# Patient Record
Sex: MALE | Race: WHITE | Employment: OTHER | URBAN - METROPOLITAN AREA
[De-identification: names, ages, dates, MRNs, and addresses within clinical notes are randomized per-mention and may not be internally consistent; named-entity substitution may affect disease eponyms.]

---

## 2017-02-01 ENCOUNTER — OFFICE VISIT (OUTPATIENT)
Dept: FAMILY MEDICINE CLINIC | Age: 82
End: 2017-02-01

## 2017-02-01 VITALS
RESPIRATION RATE: 20 BRPM | TEMPERATURE: 97.4 F | WEIGHT: 160 LBS | DIASTOLIC BLOOD PRESSURE: 72 MMHG | HEIGHT: 71 IN | HEART RATE: 76 BPM | SYSTOLIC BLOOD PRESSURE: 118 MMHG | BODY MASS INDEX: 22.4 KG/M2

## 2017-02-01 DIAGNOSIS — R11.0 NAUSEA: ICD-10-CM

## 2017-02-01 DIAGNOSIS — A08.4 VIRAL GASTROENTERITIS: Primary | ICD-10-CM

## 2017-02-01 DIAGNOSIS — E11.9 TYPE 2 DIABETES MELLITUS WITHOUT COMPLICATION, WITHOUT LONG-TERM CURRENT USE OF INSULIN (HCC): ICD-10-CM

## 2017-02-01 DIAGNOSIS — J34.89 SINUS PRESSURE: ICD-10-CM

## 2017-02-01 LAB
CREATININE URINE: 24.4 MG/DL
HBA1C MFR BLD: 5.7 % (ref 4.8–5.9)
MICROALBUMIN UR-MCNC: <1.2 MG/DL
MICROALBUMIN/CREAT UR-RTO: NORMAL MG/G (ref 0–30)

## 2017-02-01 PROCEDURE — 99213 OFFICE O/P EST LOW 20 MIN: CPT | Performed by: FAMILY MEDICINE

## 2017-02-01 ASSESSMENT — ENCOUNTER SYMPTOMS
ABDOMINAL PAIN: 0
COUGH: 1
CONSTIPATION: 1
SORE THROAT: 0
SINUS PRESSURE: 1
EYE PAIN: 1

## 2017-02-27 ENCOUNTER — CARE COORDINATION (OUTPATIENT)
Dept: FAMILY MEDICINE CLINIC | Age: 82
End: 2017-02-27

## 2017-02-27 RX ORDER — COLCHICINE 0.6 MG/1
0.6 TABLET ORAL DAILY
COMMUNITY
End: 2017-02-28 | Stop reason: CLARIF

## 2017-02-27 RX ORDER — DICYCLOMINE HCL 20 MG
20 TABLET ORAL 4 TIMES DAILY
COMMUNITY
Start: 2017-02-27 | End: 2017-02-28 | Stop reason: CLARIF

## 2017-02-28 ENCOUNTER — CARE COORDINATOR VISIT (OUTPATIENT)
Dept: FAMILY MEDICINE CLINIC | Age: 82
End: 2017-02-28

## 2017-02-28 RX ORDER — ASPIRIN 81 MG/1
81 TABLET ORAL DAILY
Status: ON HOLD | COMMUNITY
Start: 2017-02-22 | End: 2018-10-31 | Stop reason: HOSPADM

## 2017-02-28 RX ORDER — METOPROLOL SUCCINATE 25 MG/1
25 TABLET, EXTENDED RELEASE ORAL DAILY
COMMUNITY
End: 2017-03-01 | Stop reason: SDUPTHER

## 2017-02-28 RX ORDER — LISINOPRIL 2.5 MG/1
2.5 TABLET ORAL DAILY
COMMUNITY
Start: 2017-02-21 | End: 2017-03-01 | Stop reason: SDUPTHER

## 2017-03-01 ENCOUNTER — OFFICE VISIT (OUTPATIENT)
Dept: FAMILY MEDICINE CLINIC | Age: 82
End: 2017-03-01

## 2017-03-01 VITALS
SYSTOLIC BLOOD PRESSURE: 108 MMHG | HEART RATE: 68 BPM | HEIGHT: 71 IN | RESPIRATION RATE: 20 BRPM | DIASTOLIC BLOOD PRESSURE: 68 MMHG | WEIGHT: 164 LBS | TEMPERATURE: 97.7 F | BODY MASS INDEX: 22.96 KG/M2

## 2017-03-01 DIAGNOSIS — R10.84 GENERALIZED ABDOMINAL PAIN: Primary | ICD-10-CM

## 2017-03-01 DIAGNOSIS — Z09 HOSPITAL DISCHARGE FOLLOW-UP: ICD-10-CM

## 2017-03-01 DIAGNOSIS — R11.0 NAUSEA: ICD-10-CM

## 2017-03-01 DIAGNOSIS — F43.21 GRIEF REACTION: ICD-10-CM

## 2017-03-01 DIAGNOSIS — R42 LIGHTHEADED: ICD-10-CM

## 2017-03-01 PROCEDURE — 99214 OFFICE O/P EST MOD 30 MIN: CPT | Performed by: FAMILY MEDICINE

## 2017-03-01 RX ORDER — METOPROLOL SUCCINATE 25 MG/1
25 TABLET, EXTENDED RELEASE ORAL DAILY
Qty: 30 TABLET | Refills: 5 | Status: SHIPPED | OUTPATIENT
Start: 2017-03-01 | End: 2017-04-27

## 2017-03-01 RX ORDER — LISINOPRIL 2.5 MG/1
2.5 TABLET ORAL DAILY
Qty: 30 TABLET | Refills: 5 | Status: SHIPPED | OUTPATIENT
Start: 2017-03-01 | End: 2017-04-24 | Stop reason: SDUPTHER

## 2017-03-01 ASSESSMENT — ENCOUNTER SYMPTOMS
SHORTNESS OF BREATH: 0
COUGH: 0
DIARRHEA: 0
NAUSEA: 1
EYE ITCHING: 0
CONSTIPATION: 0
EYE DISCHARGE: 0
SINUS PRESSURE: 0
SORE THROAT: 0

## 2017-04-06 ENCOUNTER — OFFICE VISIT (OUTPATIENT)
Dept: FAMILY MEDICINE CLINIC | Age: 82
End: 2017-04-06

## 2017-04-06 VITALS
BODY MASS INDEX: 22.54 KG/M2 | HEART RATE: 64 BPM | DIASTOLIC BLOOD PRESSURE: 62 MMHG | RESPIRATION RATE: 20 BRPM | TEMPERATURE: 97.3 F | SYSTOLIC BLOOD PRESSURE: 108 MMHG | WEIGHT: 161 LBS | HEIGHT: 71 IN

## 2017-04-06 DIAGNOSIS — I95.9 HYPOTENSION, UNSPECIFIED HYPOTENSION TYPE: Primary | ICD-10-CM

## 2017-04-06 DIAGNOSIS — F43.21 GRIEF REACTION: ICD-10-CM

## 2017-04-06 DIAGNOSIS — G47.00 INSOMNIA, UNSPECIFIED TYPE: ICD-10-CM

## 2017-04-06 PROCEDURE — 99213 OFFICE O/P EST LOW 20 MIN: CPT | Performed by: FAMILY MEDICINE

## 2017-04-06 RX ORDER — ONDANSETRON 4 MG/1
TABLET, FILM COATED ORAL
Qty: 30 TABLET | Refills: 1 | Status: SHIPPED | OUTPATIENT
Start: 2017-04-06 | End: 2017-06-06 | Stop reason: SDUPTHER

## 2017-04-06 RX ORDER — TAMSULOSIN HYDROCHLORIDE 0.4 MG/1
CAPSULE ORAL
Qty: 90 CAPSULE | Refills: 1 | Status: SHIPPED | OUTPATIENT
Start: 2017-04-06 | End: 2017-06-01 | Stop reason: SDUPTHER

## 2017-04-06 ASSESSMENT — ENCOUNTER SYMPTOMS
SORE THROAT: 0
ABDOMINAL PAIN: 0
EYE ITCHING: 0
EYE DISCHARGE: 0
CONSTIPATION: 0
DIARRHEA: 0
COUGH: 0
SINUS PRESSURE: 0

## 2017-04-21 RX ORDER — OMEPRAZOLE 40 MG/1
CAPSULE, DELAYED RELEASE ORAL
Qty: 90 CAPSULE | Refills: 3 | Status: SHIPPED | OUTPATIENT
Start: 2017-04-21 | End: 2017-04-24 | Stop reason: SDUPTHER

## 2017-04-24 RX ORDER — OMEPRAZOLE 40 MG/1
CAPSULE, DELAYED RELEASE ORAL
Qty: 90 CAPSULE | Refills: 3 | Status: SHIPPED | OUTPATIENT
Start: 2017-04-24 | End: 2017-06-01 | Stop reason: SDUPTHER

## 2017-04-24 RX ORDER — LISINOPRIL 2.5 MG/1
2.5 TABLET ORAL DAILY
Qty: 30 TABLET | Refills: 0 | Status: SHIPPED | OUTPATIENT
Start: 2017-04-24 | End: 2017-04-27 | Stop reason: SDUPTHER

## 2017-04-24 RX ORDER — LISINOPRIL 2.5 MG/1
2.5 TABLET ORAL DAILY
Qty: 90 TABLET | Refills: 3 | Status: SHIPPED | OUTPATIENT
Start: 2017-04-24 | End: 2017-04-27

## 2017-04-27 ENCOUNTER — OFFICE VISIT (OUTPATIENT)
Dept: FAMILY MEDICINE CLINIC | Age: 82
End: 2017-04-27

## 2017-04-27 VITALS
RESPIRATION RATE: 20 BRPM | HEART RATE: 80 BPM | BODY MASS INDEX: 22.4 KG/M2 | SYSTOLIC BLOOD PRESSURE: 102 MMHG | DIASTOLIC BLOOD PRESSURE: 62 MMHG | WEIGHT: 160 LBS | HEIGHT: 71 IN | TEMPERATURE: 97.9 F

## 2017-04-27 DIAGNOSIS — F43.21 GRIEF REACTION: ICD-10-CM

## 2017-04-27 DIAGNOSIS — I95.9 HYPOTENSION, UNSPECIFIED HYPOTENSION TYPE: Primary | ICD-10-CM

## 2017-04-27 PROCEDURE — 99213 OFFICE O/P EST LOW 20 MIN: CPT | Performed by: FAMILY MEDICINE

## 2017-04-27 ASSESSMENT — ENCOUNTER SYMPTOMS
EYE DISCHARGE: 0
SINUS PRESSURE: 0
EYE ITCHING: 0
CONSTIPATION: 0
SORE THROAT: 0
ABDOMINAL PAIN: 0
COUGH: 0
SHORTNESS OF BREATH: 0
DIARRHEA: 0

## 2017-05-08 RX ORDER — SIMVASTATIN 40 MG
TABLET ORAL
Qty: 30 TABLET | Refills: 4 | Status: SHIPPED | OUTPATIENT
Start: 2017-05-08 | End: 2017-06-01 | Stop reason: SDUPTHER

## 2017-06-01 ENCOUNTER — OFFICE VISIT (OUTPATIENT)
Dept: FAMILY MEDICINE CLINIC | Age: 82
End: 2017-06-01

## 2017-06-01 VITALS
RESPIRATION RATE: 16 BRPM | WEIGHT: 164 LBS | DIASTOLIC BLOOD PRESSURE: 72 MMHG | TEMPERATURE: 97.3 F | BODY MASS INDEX: 22.96 KG/M2 | HEIGHT: 71 IN | SYSTOLIC BLOOD PRESSURE: 108 MMHG | HEART RATE: 84 BPM

## 2017-06-01 DIAGNOSIS — R53.81 MALAISE AND FATIGUE: ICD-10-CM

## 2017-06-01 DIAGNOSIS — I10 ESSENTIAL HYPERTENSION: ICD-10-CM

## 2017-06-01 DIAGNOSIS — E78.5 HYPERLIPIDEMIA, UNSPECIFIED HYPERLIPIDEMIA TYPE: ICD-10-CM

## 2017-06-01 DIAGNOSIS — R53.83 MALAISE AND FATIGUE: ICD-10-CM

## 2017-06-01 DIAGNOSIS — R05.3 CHRONIC COUGH: ICD-10-CM

## 2017-06-01 DIAGNOSIS — I10 ESSENTIAL HYPERTENSION: Primary | ICD-10-CM

## 2017-06-01 DIAGNOSIS — E11.9 TYPE 2 DIABETES MELLITUS WITHOUT COMPLICATION, WITHOUT LONG-TERM CURRENT USE OF INSULIN (HCC): ICD-10-CM

## 2017-06-01 DIAGNOSIS — R13.10 DYSPHAGIA, UNSPECIFIED TYPE: ICD-10-CM

## 2017-06-01 DIAGNOSIS — R05.8 NON-PRODUCTIVE COUGH: ICD-10-CM

## 2017-06-01 LAB
ALBUMIN SERPL-MCNC: 4.3 G/DL (ref 3.9–4.9)
ALP BLD-CCNC: 70 U/L (ref 35–104)
ALT SERPL-CCNC: 14 U/L (ref 0–41)
ANION GAP SERPL CALCULATED.3IONS-SCNC: 13 MEQ/L (ref 7–13)
AST SERPL-CCNC: 16 U/L (ref 0–40)
BASOPHILS ABSOLUTE: 0 K/UL (ref 0–0.2)
BASOPHILS RELATIVE PERCENT: 0.7 %
BILIRUB SERPL-MCNC: 0.3 MG/DL (ref 0–1.2)
BUN BLDV-MCNC: 27 MG/DL (ref 8–23)
CALCIUM SERPL-MCNC: 8.8 MG/DL (ref 8.6–10.2)
CHLORIDE BLD-SCNC: 104 MEQ/L (ref 98–107)
CHOLESTEROL, TOTAL: 143 MG/DL (ref 0–199)
CO2: 23 MEQ/L (ref 22–29)
CREAT SERPL-MCNC: 1.13 MG/DL (ref 0.7–1.2)
EOSINOPHILS ABSOLUTE: 0.1 K/UL (ref 0–0.7)
EOSINOPHILS RELATIVE PERCENT: 1.6 %
GFR AFRICAN AMERICAN: >60
GFR NON-AFRICAN AMERICAN: >60
GLOBULIN: 2.6 G/DL (ref 2.3–3.5)
GLUCOSE BLD-MCNC: 108 MG/DL (ref 74–109)
HCT VFR BLD CALC: 36.7 % (ref 42–52)
HDLC SERPL-MCNC: 31 MG/DL (ref 40–59)
HEMOGLOBIN: 12.1 G/DL (ref 14–18)
LDL CHOLESTEROL CALCULATED: 41 MG/DL (ref 0–129)
LYMPHOCYTES ABSOLUTE: 1.2 K/UL (ref 1–4.8)
LYMPHOCYTES RELATIVE PERCENT: 21.8 %
MCH RBC QN AUTO: 31.5 PG (ref 27–31.3)
MCHC RBC AUTO-ENTMCNC: 33 % (ref 33–37)
MCV RBC AUTO: 95.7 FL (ref 80–100)
MONOCYTES ABSOLUTE: 0.5 K/UL (ref 0.2–0.8)
MONOCYTES RELATIVE PERCENT: 9.8 %
NEUTROPHILS ABSOLUTE: 3.6 K/UL (ref 1.4–6.5)
NEUTROPHILS RELATIVE PERCENT: 66.1 %
PDW BLD-RTO: 13.4 % (ref 11.5–14.5)
PLATELET # BLD: 188 K/UL (ref 130–400)
POTASSIUM SERPL-SCNC: 4.6 MEQ/L (ref 3.5–5.1)
RBC # BLD: 3.84 M/UL (ref 4.7–6.1)
SODIUM BLD-SCNC: 140 MEQ/L (ref 132–144)
T4 FREE: 1 NG/DL (ref 0.93–1.7)
TOTAL PROTEIN: 6.9 G/DL (ref 6.4–8.1)
TRIGL SERPL-MCNC: 355 MG/DL (ref 0–200)
WBC # BLD: 5.5 K/UL (ref 4.8–10.8)

## 2017-06-01 PROCEDURE — 99214 OFFICE O/P EST MOD 30 MIN: CPT | Performed by: FAMILY MEDICINE

## 2017-06-01 RX ORDER — OMEPRAZOLE 40 MG/1
CAPSULE, DELAYED RELEASE ORAL
Qty: 90 CAPSULE | Refills: 1 | Status: SHIPPED | OUTPATIENT
Start: 2017-06-01 | End: 2018-01-19 | Stop reason: SDUPTHER

## 2017-06-01 RX ORDER — SIMVASTATIN 40 MG
TABLET ORAL
Qty: 90 TABLET | Refills: 1 | Status: SHIPPED | OUTPATIENT
Start: 2017-06-01 | End: 2017-09-28 | Stop reason: ALTCHOICE

## 2017-06-01 RX ORDER — TAMSULOSIN HYDROCHLORIDE 0.4 MG/1
CAPSULE ORAL
Qty: 90 CAPSULE | Refills: 1 | Status: SHIPPED | OUTPATIENT
Start: 2017-06-01 | End: 2018-01-14 | Stop reason: SDUPTHER

## 2017-06-01 ASSESSMENT — ENCOUNTER SYMPTOMS
EYE DISCHARGE: 0
SINUS PRESSURE: 1
SHORTNESS OF BREATH: 0
SORE THROAT: 0
DIARRHEA: 0
ABDOMINAL PAIN: 0
COUGH: 1
CONSTIPATION: 0
EYE ITCHING: 0

## 2017-06-03 LAB — TESTOSTERONE TOTAL-MALE: 225 NG/DL (ref 300–720)

## 2017-06-06 ENCOUNTER — NURSE ONLY (OUTPATIENT)
Dept: FAMILY MEDICINE CLINIC | Age: 82
End: 2017-06-06

## 2017-06-06 ENCOUNTER — OFFICE VISIT (OUTPATIENT)
Dept: FAMILY MEDICINE CLINIC | Age: 82
End: 2017-06-06

## 2017-06-06 VITALS
SYSTOLIC BLOOD PRESSURE: 130 MMHG | RESPIRATION RATE: 10 BRPM | BODY MASS INDEX: 22.96 KG/M2 | HEIGHT: 71 IN | DIASTOLIC BLOOD PRESSURE: 80 MMHG | HEART RATE: 70 BPM | TEMPERATURE: 96.9 F | WEIGHT: 164 LBS

## 2017-06-06 DIAGNOSIS — D64.9 ANEMIA, UNSPECIFIED TYPE: ICD-10-CM

## 2017-06-06 DIAGNOSIS — E29.1 HYPOGONADISM IN MALE: Primary | ICD-10-CM

## 2017-06-06 LAB
IRON SATURATION: 28 % (ref 11–46)
IRON: 88 UG/DL (ref 59–158)
TOTAL IRON BINDING CAPACITY: 317 UG/DL (ref 178–450)

## 2017-06-06 PROCEDURE — 99213 OFFICE O/P EST LOW 20 MIN: CPT | Performed by: FAMILY MEDICINE

## 2017-06-06 PROCEDURE — 96372 THER/PROPH/DIAG INJ SC/IM: CPT | Performed by: FAMILY MEDICINE

## 2017-06-06 RX ORDER — TESTOSTERONE CYPIONATE 200 MG/ML
200 INJECTION INTRAMUSCULAR
COMMUNITY
End: 2018-01-26

## 2017-06-06 RX ORDER — ONDANSETRON 4 MG/1
TABLET, FILM COATED ORAL
Qty: 30 TABLET | Refills: 1 | Status: SHIPPED | OUTPATIENT
Start: 2017-06-06 | End: 2017-11-17 | Stop reason: SDUPTHER

## 2017-06-06 RX ORDER — TESTOSTERONE CYPIONATE 200 MG/ML
200 INJECTION INTRAMUSCULAR ONCE
Status: COMPLETED | OUTPATIENT
Start: 2017-06-06 | End: 2017-06-06

## 2017-06-06 RX ADMIN — TESTOSTERONE CYPIONATE 200 MG: 200 INJECTION INTRAMUSCULAR at 10:18

## 2017-06-06 ASSESSMENT — ENCOUNTER SYMPTOMS
SHORTNESS OF BREATH: 0
EYES NEGATIVE: 1
COUGH: 0
DIARRHEA: 0
NAUSEA: 0
CONSTIPATION: 0
ABDOMINAL PAIN: 0

## 2017-06-07 LAB — VITAMIN B-12: 389 PG/ML (ref 211–946)

## 2017-08-01 ENCOUNTER — NURSE ONLY (OUTPATIENT)
Dept: FAMILY MEDICINE CLINIC | Age: 82
End: 2017-08-01

## 2017-08-01 DIAGNOSIS — E29.1 HYPOGONADISM IN MALE: Primary | ICD-10-CM

## 2017-08-01 PROCEDURE — 96372 THER/PROPH/DIAG INJ SC/IM: CPT | Performed by: FAMILY MEDICINE

## 2017-08-01 RX ORDER — TESTOSTERONE CYPIONATE 200 MG/ML
200 INJECTION INTRAMUSCULAR ONCE
Status: COMPLETED | OUTPATIENT
Start: 2017-08-01 | End: 2017-08-01

## 2017-08-01 RX ADMIN — TESTOSTERONE CYPIONATE 200 MG: 200 INJECTION INTRAMUSCULAR at 10:18

## 2017-09-05 ENCOUNTER — NURSE ONLY (OUTPATIENT)
Dept: FAMILY MEDICINE CLINIC | Age: 82
End: 2017-09-05

## 2017-09-05 ENCOUNTER — TELEPHONE (OUTPATIENT)
Dept: FAMILY MEDICINE CLINIC | Age: 82
End: 2017-09-05

## 2017-09-05 DIAGNOSIS — E29.1 HYPOGONADISM IN MALE: Primary | ICD-10-CM

## 2017-09-05 PROCEDURE — 96372 THER/PROPH/DIAG INJ SC/IM: CPT | Performed by: FAMILY MEDICINE

## 2017-09-05 RX ORDER — TESTOSTERONE CYPIONATE 200 MG/ML
200 INJECTION INTRAMUSCULAR ONCE
Status: COMPLETED | OUTPATIENT
Start: 2017-09-05 | End: 2017-09-05

## 2017-09-05 RX ADMIN — TESTOSTERONE CYPIONATE 200 MG: 200 INJECTION INTRAMUSCULAR at 10:12

## 2017-09-19 DIAGNOSIS — K27.4 GASTROINTESTINAL HEMORRHAGE ASSOCIATED WITH PEPTIC ULCER: Primary | ICD-10-CM

## 2017-09-19 LAB
BUN BLDV-MCNC: 9 MG/DL
CALCIUM SERPL-MCNC: 8.1 MG/DL
CHLORIDE BLD-SCNC: 107 MMOL/L
CHOLESTEROL, TOTAL: 5 MG/DL
CHOLESTEROL/HDL RATIO: 2.9
CO2: NORMAL MMOL/L
CREAT SERPL-MCNC: 1.36 MG/DL
GFR CALCULATED: NORMAL
GLUCOSE BLD-MCNC: 101 MG/DL
HDLC SERPL-MCNC: 19 MG/DL (ref 35–70)
LDL CHOLESTEROL CALCULATED: 21 MG/DL (ref 0–160)
POTASSIUM SERPL-SCNC: 3.5 MMOL/L
SODIUM BLD-SCNC: 141 MMOL/L
TRIGL SERPL-MCNC: 82 MG/DL
VLDLC SERPL CALC-MCNC: 16 MG/DL

## 2017-09-26 ENCOUNTER — CARE COORDINATION (OUTPATIENT)
Dept: PRIMARY CARE CLINIC | Age: 82
End: 2017-09-26

## 2017-09-28 ENCOUNTER — OFFICE VISIT (OUTPATIENT)
Dept: FAMILY MEDICINE CLINIC | Age: 82
End: 2017-09-28

## 2017-09-28 VITALS
DIASTOLIC BLOOD PRESSURE: 82 MMHG | TEMPERATURE: 97.9 F | HEIGHT: 71 IN | WEIGHT: 162 LBS | HEART RATE: 67 BPM | BODY MASS INDEX: 22.68 KG/M2 | SYSTOLIC BLOOD PRESSURE: 118 MMHG

## 2017-09-28 DIAGNOSIS — I42.9 CARDIOMYOPATHY, UNSPECIFIED TYPE (HCC): ICD-10-CM

## 2017-09-28 DIAGNOSIS — C67.9 MALIGNANT NEOPLASM OF URINARY BLADDER, UNSPECIFIED SITE (HCC): ICD-10-CM

## 2017-09-28 DIAGNOSIS — E11.9 TYPE 2 DIABETES MELLITUS WITHOUT COMPLICATION, WITHOUT LONG-TERM CURRENT USE OF INSULIN (HCC): ICD-10-CM

## 2017-09-28 DIAGNOSIS — I10 ESSENTIAL HYPERTENSION: ICD-10-CM

## 2017-09-28 DIAGNOSIS — K52.9 COLITIS: Primary | ICD-10-CM

## 2017-09-28 DIAGNOSIS — C67.9 PRIMARY BLADDER MALIGNANT NEOPLASM (HCC): ICD-10-CM

## 2017-09-28 LAB
ANION GAP SERPL CALCULATED.3IONS-SCNC: 14 MEQ/L (ref 7–13)
BUN BLDV-MCNC: 27 MG/DL (ref 8–23)
CALCIUM SERPL-MCNC: 8.8 MG/DL (ref 8.6–10.2)
CHLORIDE BLD-SCNC: 100 MEQ/L (ref 98–107)
CO2: 25 MEQ/L (ref 22–29)
CREAT SERPL-MCNC: 0.95 MG/DL (ref 0.7–1.2)
CREATININE URINE: 59.9 MG/DL
GFR AFRICAN AMERICAN: >60
GFR NON-AFRICAN AMERICAN: >60
GLUCOSE BLD-MCNC: 105 MG/DL (ref 74–109)
HBA1C MFR BLD: 5.9 % (ref 4.8–5.9)
MICROALBUMIN UR-MCNC: <1.2 MG/DL
MICROALBUMIN/CREAT UR-RTO: NORMAL MG/G (ref 0–30)
POTASSIUM SERPL-SCNC: 5 MEQ/L (ref 3.5–5.1)
SODIUM BLD-SCNC: 139 MEQ/L (ref 132–144)

## 2017-09-28 PROCEDURE — 99214 OFFICE O/P EST MOD 30 MIN: CPT | Performed by: FAMILY MEDICINE

## 2017-09-28 RX ORDER — LOSARTAN POTASSIUM 25 MG/1
25 TABLET ORAL DAILY
Refills: 6 | COMMUNITY
Start: 2017-09-24 | End: 2017-11-21 | Stop reason: ALTCHOICE

## 2017-09-28 RX ORDER — DOCUSATE SODIUM 100 MG/1
100 CAPSULE, LIQUID FILLED ORAL 2 TIMES DAILY
Status: ON HOLD | COMMUNITY
Start: 2012-04-26 | End: 2017-12-08

## 2017-09-28 RX ORDER — SIMVASTATIN 20 MG
20 TABLET ORAL NIGHTLY
COMMUNITY
End: 2018-01-22 | Stop reason: SDUPTHER

## 2017-09-28 RX ORDER — AMLODIPINE BESYLATE 5 MG/1
5 TABLET ORAL DAILY
Refills: 0 | COMMUNITY
Start: 2017-09-21 | End: 2017-10-31 | Stop reason: SDUPTHER

## 2017-09-28 ASSESSMENT — ENCOUNTER SYMPTOMS
SHORTNESS OF BREATH: 0
SORE THROAT: 0
SINUS PRESSURE: 0
ABDOMINAL PAIN: 0
EYE ITCHING: 0
COUGH: 1
CONSTIPATION: 0
DIARRHEA: 0
EYE DISCHARGE: 0

## 2017-10-02 ENCOUNTER — CARE COORDINATION (OUTPATIENT)
Dept: FAMILY MEDICINE CLINIC | Age: 82
End: 2017-10-02

## 2017-10-09 ENCOUNTER — CARE COORDINATION (OUTPATIENT)
Dept: FAMILY MEDICINE CLINIC | Age: 82
End: 2017-10-09

## 2017-10-09 NOTE — CARE COORDINATION
Planning:  Avoidance of concentrated sweets   How often do you test your blood sugar?:  No Testing   Do you have barriers with adherence to non-pharmacologic self-management interventions?  (Nutrition/Exercise/Self-Monitoring):  No   Have you ever had to go to the ED for symptoms of low blood sugar?:  No       No patient-reported symptoms      ,

## 2017-10-09 NOTE — CARE COORDINATION
Ambulatory Care Coordination Note  10/9/2017  CM Risk Score: 2  Adriane Mortality Risk Score: 11.68    ACC: Jens Banuelos RN    Summary Note: F/U call with patient. Denies issues or concerns. Denies chest pressure, fluttering heart. States has been taking medications , ACC noted when questioned in detail re: medications patient had difficulty  naming some medications. And explaining dose/when to take. Why taking. Instructed in all medications from inpatient list and instructed to bring all medications to next weeks appt for review with ACC. Patient denies abd. Pain, black stools, states eating ok, drinking fluids. Patient lives alone and has little support other than nieces/nephews who stop by on occasion. States takes care of himself, fixes own meals or goes out. Patient takes care of home. Patient verbalizes good understanding of having had colonscopy and polyp removed. Instructed in sx of bleeding to report. Care Coordination Interventions    Program Enrollment:  Rising Risk  Referral from Primary Care Provider:  No  Suggested Interventions and Community Resources  Medi Set or Pill Pack: In Process         Goals Addressed     None          Prior to Admission medications    Medication Sig Start Date End Date Taking?  Authorizing Provider   amLODIPine (NORVASC) 5 MG tablet Take 5 mg by mouth daily 9/21/17  Yes Historical Provider, MD   metoprolol tartrate (LOPRESSOR) 25 MG tablet TAKE 1/2 (ONE-HALF) OF A TABLET TWICE DAILY (hold for sbp<100 or hr <60) 9/21/17  Yes Historical Provider, MD   losartan (COZAAR) 25 MG tablet Take 25 mg by mouth daily 9/24/17   Historical Provider, MD   docusate sodium (COLACE) 100 MG capsule Take 100 mg by mouth 2 times daily 4/26/12   Historical Provider, MD   simvastatin (ZOCOR) 20 MG tablet Take 20 mg by mouth nightly    Historical Provider, MD   ondansetron (ZOFRAN) 4 MG tablet TAKE 1 TO 2 TABLETS EVERY 4 TO 6 HOURS AS NEEDED FOR NAUSEA AND VOMITING 6/6/17   Star Fragoso MD   testosterone cypionate (DEPOTESTOTERONE CYPIONATE) 200 MG/ML injection Inject 200 mg into the muscle every 30 days    Historical Provider, MD   omeprazole (PRILOSEC) 40 MG delayed release capsule TAKE 1 CAPSULE DAILY 6/1/17   Uriel Guerrero MD   tamsulosin (FLOMAX) 0.4 MG capsule TAKE 1 CAPSULE DAILY 6/1/17   Uriel Guerrero MD   aspirin 81 MG EC tablet Take 81 mg by mouth daily 2/22/17   Historical Provider, MD   bimatoprost (LUMIGAN) 0.01 % SOLN ophthalmic drops Place 1 drop into both eyes nightly 1 drop both eyes daily at bedtime    Historical Provider, MD   Multiple Vitamin (MULTIVITAMIN PO) Take  by mouth. Historical Provider, MD       Future Appointments  Date Time Provider Ben Max   10/10/2017 9:45 AM Uriel Guerrero MD [facility]     General Assessment    Do you have any symptoms that are causing concern?:  No      and   Diabetes Assessment    Medic Alert ID:  No  Meal Planning:  Avoidance of concentrated sweets   How often do you test your blood sugar?:  No Testing   Do you have barriers with adherence to non-pharmacologic self-management interventions?  (Nutrition/Exercise/Self-Monitoring):  No   Have you ever had to go to the ED for symptoms of low blood sugar?:  No       No patient-reported symptoms      ,

## 2017-10-10 ENCOUNTER — CARE COORDINATOR VISIT (OUTPATIENT)
Dept: FAMILY MEDICINE CLINIC | Age: 82
End: 2017-10-10

## 2017-10-10 ENCOUNTER — OFFICE VISIT (OUTPATIENT)
Dept: FAMILY MEDICINE CLINIC | Age: 82
End: 2017-10-10

## 2017-10-10 VITALS
TEMPERATURE: 97.5 F | HEART RATE: 65 BPM | HEIGHT: 71 IN | WEIGHT: 164 LBS | SYSTOLIC BLOOD PRESSURE: 118 MMHG | RESPIRATION RATE: 12 BRPM | DIASTOLIC BLOOD PRESSURE: 62 MMHG | BODY MASS INDEX: 22.96 KG/M2

## 2017-10-10 DIAGNOSIS — K63.89 COLONIC MASS: ICD-10-CM

## 2017-10-10 DIAGNOSIS — Z01.818 PRE-OP EXAM: ICD-10-CM

## 2017-10-10 DIAGNOSIS — Z01.818 PRE-OP EXAM: Primary | ICD-10-CM

## 2017-10-10 LAB
ANION GAP SERPL CALCULATED.3IONS-SCNC: 13 MEQ/L (ref 7–13)
APTT: 25 SEC (ref 21.6–35.4)
BASOPHILS ABSOLUTE: 0 K/UL (ref 0–0.2)
BASOPHILS RELATIVE PERCENT: 0.4 %
BILIRUBIN URINE: NEGATIVE
BLOOD, URINE: NEGATIVE
BUN BLDV-MCNC: 26 MG/DL (ref 8–23)
CALCIUM SERPL-MCNC: 9.3 MG/DL (ref 8.6–10.2)
CHLORIDE BLD-SCNC: 100 MEQ/L (ref 98–107)
CLARITY: CLEAR
CO2: 25 MEQ/L (ref 22–29)
COLOR: YELLOW
CREAT SERPL-MCNC: 0.97 MG/DL (ref 0.7–1.2)
EOSINOPHILS ABSOLUTE: 0.2 K/UL (ref 0–0.7)
EOSINOPHILS RELATIVE PERCENT: 2.4 %
GFR AFRICAN AMERICAN: >60
GFR NON-AFRICAN AMERICAN: >60
GLUCOSE BLD-MCNC: 105 MG/DL (ref 74–109)
GLUCOSE URINE: NEGATIVE MG/DL
HCT VFR BLD CALC: 37.5 % (ref 42–52)
HEMOGLOBIN: 12.4 G/DL (ref 14–18)
INR BLD: 1
KETONES, URINE: NEGATIVE MG/DL
LEUKOCYTE ESTERASE, URINE: NEGATIVE
LYMPHOCYTES ABSOLUTE: 1.4 K/UL (ref 1–4.8)
LYMPHOCYTES RELATIVE PERCENT: 21.2 %
MCH RBC QN AUTO: 30.6 PG (ref 27–31.3)
MCHC RBC AUTO-ENTMCNC: 33.2 % (ref 33–37)
MCV RBC AUTO: 92.2 FL (ref 80–100)
MONOCYTES ABSOLUTE: 0.6 K/UL (ref 0.2–0.8)
MONOCYTES RELATIVE PERCENT: 8.8 %
NEUTROPHILS ABSOLUTE: 4.5 K/UL (ref 1.4–6.5)
NEUTROPHILS RELATIVE PERCENT: 67.2 %
NITRITE, URINE: NEGATIVE
PDW BLD-RTO: 13.8 % (ref 11.5–14.5)
PH UA: 7 (ref 5–9)
PLATELET # BLD: 192 K/UL (ref 130–400)
POTASSIUM SERPL-SCNC: 5 MEQ/L (ref 3.5–5.1)
PROTEIN UA: NEGATIVE MG/DL
PROTHROMBIN TIME: 10 SEC (ref 8.1–13.7)
RBC # BLD: 4.07 M/UL (ref 4.7–6.1)
SODIUM BLD-SCNC: 138 MEQ/L (ref 132–144)
SPECIFIC GRAVITY UA: 1.01 (ref 1–1.03)
UROBILINOGEN, URINE: 0.2 E.U./DL
WBC # BLD: 6.7 K/UL (ref 4.8–10.8)

## 2017-10-10 PROCEDURE — 93000 ELECTROCARDIOGRAM COMPLETE: CPT | Performed by: FAMILY MEDICINE

## 2017-10-10 PROCEDURE — 99214 OFFICE O/P EST MOD 30 MIN: CPT | Performed by: FAMILY MEDICINE

## 2017-10-10 ASSESSMENT — ENCOUNTER SYMPTOMS
SHORTNESS OF BREATH: 0
DIARRHEA: 0
NAUSEA: 0
COUGH: 0
CONSTIPATION: 0
ABDOMINAL PAIN: 0
EYES NEGATIVE: 1

## 2017-10-10 ASSESSMENT — PATIENT HEALTH QUESTIONNAIRE - PHQ9: SUM OF ALL RESPONSES TO PHQ QUESTIONS 1-9: 0

## 2017-10-10 NOTE — PATIENT INSTRUCTIONS
Patient Education        Preventing Falls: Care Instructions  Your Care Instructions  Getting around your home safely can be a challenge if you have injuries or health problems that make it easy for you to fall. Loose rugs and furniture in walkways are among the dangers for many older people who have problems walking or who have poor eyesight. People who have conditions such as arthritis, osteoporosis, or dementia also have to be careful not to fall. You can make your home safer with a few simple measures. Follow-up care is a key part of your treatment and safety. Be sure to make and go to all appointments, and call your doctor if you are having problems. It's also a good idea to know your test results and keep a list of the medicines you take. How can you care for yourself at home? Taking care of yourself  · You may get dizzy if you do not drink enough water. To prevent dehydration, drink plenty of fluids, enough so that your urine is light yellow or clear like water. Choose water and other caffeine-free clear liquids. If you have kidney, heart, or liver disease and have to limit fluids, talk with your doctor before you increase the amount of fluids you drink. · Exercise regularly to improve your strength, muscle tone, and balance. Walk if you can. Swimming may be a good choice if you cannot walk easily. · Have your vision and hearing checked each year or any time you notice a change. If you have trouble seeing and hearing, you might not be able to avoid objects and could lose your balance. · Know the side effects of the medicines you take. Ask your doctor or pharmacist whether the medicines you take can affect your balance. Sleeping pills or sedatives can affect your balance. · Limit the amount of alcohol you drink. Alcohol can impair your balance and other senses. · Ask your doctor whether calluses or corns on your feet need to be removed.  If you wear loose-fitting shoes because of calluses or corns, that will allow you to sit while showering. · Get into a tub or shower by putting the weaker leg in first. Get out of a tub or shower with your strong side first.  · Repair loose toilet seats and consider installing a raised toilet seat to make getting on and off the toilet easier. · Keep your bathroom door unlocked while you are in the shower. Where can you learn more? Go to https://chpepiceweb.[employer]. org and sign in to your Room[employer] account. Enter 0476 [id number] [phone] in the [employer] box to learn more about \"Preventing Falls: Care Instructions. \"     If you do not have an account, please click on the \"Sign Up Now\" link. Current as of: August 4, 2016  Content Version: 11.3  © 6862-0395 [employer]OutMap, [employer]houwu. Care instructions adapted under license by [facility] ([facility]). If you have questions about a medical condition or this instruction, always ask your healthcare professional. [place] [address] any warranty or liability for your use of this information. Patient Education        Preventing Outdoor Falls: Care Instructions  Your Care Instructions  Worries about falls don't need to keep you indoors. Outdoor activities like walking have big benefits for your health. You will need to watch your step and learn a few safety measures. If you are worried about having a fall outdoors, ask your doctor about exercises, classes, or physical therapy that may help. You can learn ways to gain strength, flexibility, and balance. Ask if it might help to use a cane or walker. You can make your time outdoors safer with a few simple measures. Follow-up care is a key part of your treatment and safety. Be sure to make and go to all appointments, and call your doctor if you are having problems. It's also a good idea to know your test results and keep a list of the medicines you take. How can you prevent falls outdoors? · Wear shoes with firm soles and low heels.  If you have to walk on an icy surface, use grippers that can be worn over your shoes in bad weather. · Be extra careful if weather is bad. Walk on the grass when the sidewalks are slick. If you live in a place that gets snow and ice in the winter, sprinkle salt on slippery stairs and sidewalks. · Be careful getting on or off buses and trains or getting in and out of cars. If handrails are available, use them. · Be careful when you cross the street. Look for crosswalks or places where curb cuts or ramps are present. · Try not to hurry, especially if you are carrying something. · Be cautious in parking lots or garages. There may be curbs or changes in pavement, or the height of the pavement may vary. · Make sure to wear the correct eyeglasses, if you need them. Reading glasses or bifocals can make it harder to see hazards that might be in your way. · If you are walking outdoors for exercise, try to:  ¨ Walk in well-lighted, well-maintained areas. These include high school or college tracks, shopping malls, and public spaces. ¨ Walk with a partner. ¨ Watch out for cracked sidewalks, curbs, changes in the height of the pavement, exposed tree roots, and debris such as fallen leaves or branches. Where can you learn more? Go to https://MyMundusjuveeb.OZZ Electric. org and sign in to your Beyond Commerce account. Enter P423 in the [facility] box to learn more about \"Preventing Outdoor Falls: Care Instructions. \"     If you do not have an account, please click on the \"Sign Up Now\" link. Current as of: August 4, 2016  Content Version: 11.3  © 0291-4355 [employer]. Care instructions adapted under license by [facility] ([facility]). If you have questions about a medical condition or this instruction, always ask your healthcare professional. [place] [address] any warranty or liability for your use of this information.        Patient Education        How to Get Up Safely After a Fall: Care Instructions  Your Care Instructions  If you have injuries, health problems, or other reasons that may make it easy for you to fall at home, it is a good idea to learn how to get up safely after a fall. Learning how to get up correctly can help you avoid making an injury worse. Also, knowing what to do if you cannot get up can help you stay safe until help arrives. Follow-up care is a key part of your treatment and safety. Be sure to make and go to all appointments, and call your doctor if you are having problems. It's also a good idea to know your test results and keep a list of the medicines you take. How can you care for yourself after a fall? If you think you can get up  First lie still for a few minutes and think about how you feel. If your body feels okay and you think you can get up safely, follow the rest of the steps below:  1. Look for a chair or other piece of furniture that is close to you. 2. Roll onto your side and rest. Roll by turning your head in the direction you want to roll, move your shoulder and arm, then hip and leg in the same direction. 3. Lie still for a moment to let your blood pressure adjust.  4. Slowly push your upper body up, lift your head, and take a moment to rest.  5. Slowly get up on your hands and knees, and crawl to the chair or other stable piece of furniture. 6. Put your hands on the chair. 7. Move one foot forward, and place it flat on the floor. Your other leg should be bent with the knee on the floor. 8. Rise slowly, turn your body, and sit in the chair. Stay seated for a bit and think about how you feel. Call for help. Even if you feel okay, let someone know what happened to you. You might not know that you have a serious injury. If you cannot get up  1. If you think you are injured after a fall or you cannot get up, try not to panic. 2. Call out for help. 3. If you have a phone within reach or you have an emergency call device, use it to call for help.   4. If you do not have a phone within reach, try

## 2017-10-10 NOTE — PROGRESS NOTES
Subjective  Cammy Johnson, 80 y.o. male presents today with:  Chief Complaint   Patient presents with    Pre-op Exam     present today for Pre-op Exam for polyp removal on 10/30 with Bibiana Ceballos at [facility]. HPI    Patient in for admission testing for right hemicolectomy large mass in the right colon possible cancer. No other questions and or concerns for today's visit      Review of Systems   Constitutional: Negative for activity change, appetite change, fatigue and fever. HENT: Negative for ear pain. Eyes: Negative. Respiratory: Negative for cough and shortness of breath. Cardiovascular: Negative for chest pain and palpitations. Gastrointestinal: Negative for abdominal pain, constipation, diarrhea and nausea. Genitourinary: Negative for dysuria and frequency. Neurological: Negative for dizziness and headaches. Past Medical History:   Diagnosis Date    Abnormal finding on EKG 6/13/2016    BPH (benign prostatic hypertrophy)     Cancer (HCC)     GERD (gastroesophageal reflux disease)     Hyperlipidemia     Hypertension     Left bundle branch block 6/13/2016    Osteoarthritis     Sinus bradycardia on ECG 6/13/2016    Type II or unspecified type diabetes mellitus without mention of complication, not stated as uncontrolled      Past Surgical History:   Procedure Laterality Date    TOTAL NEPHRECTOMY  08/12/2016    [facility] kidney,  [facility]     Social History     Social History    Marital status:      Spouse name: N/A    Number of children: N/A    Years of education: N/A     Occupational History    Not on file.      Social History Main Topics    Smoking status: Never Smoker    Smokeless tobacco: Never Used    Alcohol use Not on file    Drug use: Unknown    Sexual activity: Not on file     Other Topics Concern    Not on file     Social History Narrative    No narrative on file     Family History   Problem Relation Age of Onset    Heart Attack Future     Number of Occurrences:   1     Standing Expiration Date:   10/10/2018    EKG 12 Lead     Order Specific Question:   Reason for Exam?     Answer:   Pre-op     No orders of the defined types were placed in this encounter. There are no discontinued medications. Return in about 1 month (around 11/10/2017).         Controlled Substances Monitoring:          Breana Hooks MD

## 2017-10-10 NOTE — CARE COORDINATION
Ambulatory Care Coordination Note  10/10/2017  CM Risk Score: 2  Adriane Mortality Risk Score: 11.68    ACC: Mariam Wolff, CARLOS    Summary Note: Patient was seen in office prior to pCP f/u vs. Denies c/o. Patient did not bring medications with him, did bring dc medication list.  has a med box and neighbor is a nurse and is helping him with his medications. Patient does have BP machine, states top number this morning was 102 and he did take BP pill, instructions are to hold if less than 100; states HR was 62at home and did take \"the other pill\" -Lopressor. Patient is not able to name medications , dose. Declined med box from [facility],  has a pill box at home and the neighbor is going to fill it for him. Discussed [employer] services for nurse to teach medication management but patient doesn't meet homebound criteria. Falls assessment negative but ACC provided patient with falls prevention and home safety, how to get up from a fall as patient lives alone. Patient will review for discussion next [employer]ion[employer]digest. Care Coordination Interventions    Program Enrollment:  Rising Risk  Referral from Primary Care Provider:  No  Suggested Interventions and Community Resources  Home Health Services:  Not Started (Comment: does not meet criteria; patient is not homebound)  Meals on Wheels:  Declined (Comment: states can fix own meals, sometimes goes out to eat with friends, Kirill Model also brings meals)  Medi Set or Pill Pack: In Process (Comment: states neighbor is a nurse and helps him manage meds,)  Senior Services:  Declined  Zone Management Tools: In Process (Comment: Diabetes)         Goals Addressed             Most Recent     Medication Management   On track (10/10/2017)             I will take my medication as directed. Barriers: lack of support and lack of education  Plan for overcoming my barriers: I will bring all medications to PCP vs for review with ACC.  I will work with [facility] to learn how to use a med-minder box

## 2017-10-13 ENCOUNTER — OFFICE VISIT (OUTPATIENT)
Dept: CARDIOLOGY | Age: 82
End: 2017-10-13

## 2017-10-13 VITALS
HEART RATE: 70 BPM | DIASTOLIC BLOOD PRESSURE: 62 MMHG | BODY MASS INDEX: 22.68 KG/M2 | HEIGHT: 71 IN | WEIGHT: 162 LBS | SYSTOLIC BLOOD PRESSURE: 108 MMHG | RESPIRATION RATE: 14 BRPM

## 2017-10-13 DIAGNOSIS — I44.7 LEFT BUNDLE BRANCH BLOCK: ICD-10-CM

## 2017-10-13 DIAGNOSIS — R00.1 SINUS BRADYCARDIA ON ECG: Primary | ICD-10-CM

## 2017-10-13 DIAGNOSIS — R94.31 ABNORMAL FINDING ON EKG: ICD-10-CM

## 2017-10-13 DIAGNOSIS — I10 ESSENTIAL HYPERTENSION: ICD-10-CM

## 2017-10-13 DIAGNOSIS — I20.8 ANGINA AT REST (HCC): ICD-10-CM

## 2017-10-13 PROCEDURE — 99213 OFFICE O/P EST LOW 20 MIN: CPT | Performed by: INTERNAL MEDICINE

## 2017-10-13 NOTE — PROGRESS NOTES
alert and oriented to person, place, and time. He has normal reflexes. Skin: Skin is warm and dry. Psychiatric: He has a normal mood and affect. His behavior is normal. Judgment and thought content normal.           Assessment/Orders:     ICD-10-CM ICD-9-CM    1. Sinus bradycardia on ECG R00.1 427.89 NM Myocardial Spect Rest Exercise or Rx   2. Left bundle branch block I44.7 426.3 NM Myocardial Spect Rest Exercise or Rx   3. Abnormal finding on EKG R94.31 794.31 NM Myocardial Spect Rest Exercise or Rx   4. Essential hypertension I10 401.9 NM Myocardial Spect Rest Exercise or Rx   5. Chest pain, unspecified R07.9 786.50 NM Myocardial Spect Rest Exercise or Rx       No orders of the defined types were placed in this encounter. There are no discontinued medications. Orders Placed This Encounter   Procedures    NM Myocardial Spect Rest Exercise or Rx     Standing Status:   Future     Standing Expiration Date:   10/13/2018     Scheduling Instructions:      TO BE DONE AT [address] [place] [address] 10/23/17     Order Specific Question:   Reason for Exam?     Answer:   Chest pain     Order Specific Question:   Reason for exam:     Answer:   ATRIAL FIBRILLATION     Order Specific Question:   Reason for exam:     Answer:   HTN         Plan:  1. Patient to get lexiscan stress test at [facility] 10/23/17  2. See me in 1 month.  > I will continue to monitor patient clinically, if symptoms develop or worsen, they are to let me know ASAP or head to the nearest emergeny room. > Follow up as discussed or call office sooner if needed.  > If refills are needed after appointment contact pharmacy.           Electronically signed by: Krista Simmons MD  10/13/2017 11:19 AM

## 2017-10-16 ENCOUNTER — CARE COORDINATION (OUTPATIENT)
Dept: FAMILY MEDICINE CLINIC | Age: 82
End: 2017-10-16

## 2017-10-16 NOTE — CARE COORDINATION
Ambulatory Care Coordination Note  10/16/2017  CM Risk Score: 2  Adriane Mortality Risk Score: 11.68    ACC: Brittanie Shepard RN    Summary Note: Patient keeping busy, states is busy all day taking people where they need to go. Saw cardiologist and is ready for upcoming polyp removal. States eating 'OK\". Denies nausea. States compliance with medications, is self-managing. States has not erread information mailed to him re: falls prevention, home safety. Patient in hurry due to other commitments. Agrees to plan to follow up 1 week. Care Coordination Interventions    Program Enrollment:  Rising Risk  Referral from Primary Care Provider:  No  Suggested Interventions and Community Resources  Fall Risk Prevention: In Process  Home Health Services:  Not Started (Comment: does not meet criteria; patient is not homebound)  Meals on Wheels:  Declined (Comment: states can fix own meals, sometimes goes out to eat with friends, Earl Falcon also brings meals)  Medi Set or Pill Pack: In Process (Comment: states neighbor is a nurse and helps him manage meds,)  Senior Services:  Declined  Zone Management Tools: In Process (Comment: Diabetes)         Goals Addressed             Most Recent     Medication Management   On track (10/16/2017)             I will take my medication as directed. Barriers: lack of support and lack of education  Plan for overcoming my barriers: I will bring all medications to PCP vs for review with ACC. I will work with [facility] to learn how to use a med-minder box to manage my daily medications. Confidence: 7/10  Anticipated Goal Completion Date: 1 month       Reduce Falls                 I will reduce my risk of falls by the following: I will review and implement falls prevention instructions     Barriers: lack of education  Plan for overcoming my barriers: I will read education handouts sent ot me re: falls prevention, home safety, how to get up from a fall.    Confidence: 6/10  Anticipated Goal

## 2017-10-23 ENCOUNTER — HOSPITAL ENCOUNTER (OUTPATIENT)
Dept: NON INVASIVE DIAGNOSTICS | Age: 82
Discharge: HOME | End: 2017-10-23
Payer: MEDICARE

## 2017-10-23 ENCOUNTER — HOSPITAL ENCOUNTER (OUTPATIENT)
Dept: NUCLEAR MEDICINE | Age: 82
Discharge: HOME | End: 2017-10-23
Payer: MEDICARE

## 2017-10-23 VITALS — SYSTOLIC BLOOD PRESSURE: 100 MMHG | DIASTOLIC BLOOD PRESSURE: 63 MMHG

## 2017-10-23 DIAGNOSIS — I10 ESSENTIAL HYPERTENSION: ICD-10-CM

## 2017-10-23 DIAGNOSIS — I44.7 LEFT BUNDLE BRANCH BLOCK: ICD-10-CM

## 2017-10-23 DIAGNOSIS — R94.31 ABNORMAL FINDING ON EKG: ICD-10-CM

## 2017-10-23 DIAGNOSIS — R00.1 SINUS BRADYCARDIA ON ECG: ICD-10-CM

## 2017-10-23 DIAGNOSIS — I20.8 ANGINA AT REST (HCC): ICD-10-CM

## 2017-10-23 PROCEDURE — 6360000004 HC RX CONTRAST MEDICATION: Performed by: INTERNAL MEDICINE

## 2017-10-23 PROCEDURE — 2580000003 HC RX 258: Performed by: INTERNAL MEDICINE

## 2017-10-23 PROCEDURE — 3430000000 HC RX DIAGNOSTIC RADIOPHARMACEUTICAL: Performed by: INTERNAL MEDICINE

## 2017-10-23 PROCEDURE — 93017 CV STRESS TEST TRACING ONLY: CPT

## 2017-10-23 PROCEDURE — 93018 CV STRESS TEST I&R ONLY: CPT | Performed by: INTERNAL MEDICINE

## 2017-10-23 PROCEDURE — 78452 HT MUSCLE IMAGE SPECT MULT: CPT

## 2017-10-23 PROCEDURE — A9502 TC99M TETROFOSMIN: HCPCS | Performed by: INTERNAL MEDICINE

## 2017-10-23 RX ORDER — SODIUM CHLORIDE 0.9 % (FLUSH) 0.9 %
10 SYRINGE (ML) INJECTION 2 TIMES DAILY
Status: DISCONTINUED | OUTPATIENT
Start: 2017-10-23 | End: 2017-10-26 | Stop reason: HOSPADM

## 2017-10-23 RX ADMIN — Medication 20 ML: at 11:05

## 2017-10-23 RX ADMIN — Medication 10 ML: at 09:15

## 2017-10-23 RX ADMIN — TETROFOSMIN 31.8 MILLICURIE: 0.23 INJECTION, POWDER, LYOPHILIZED, FOR SOLUTION INTRAVENOUS at 11:05

## 2017-10-23 RX ADMIN — REGADENOSON 0.4 MG: 0.08 INJECTION, SOLUTION INTRAVENOUS at 11:05

## 2017-10-23 RX ADMIN — TETROFOSMIN 10.4 MILLICURIE: 0.23 INJECTION, POWDER, LYOPHILIZED, FOR SOLUTION INTRAVENOUS at 09:15

## 2017-10-24 NOTE — PROCEDURES
[address]                     [address], [address] [facility]                             CARDIAC STRESS TEST    PATIENT NAME: Jaime Blanchard                    :             1934  MED REC NO:   [id number]                           ROOM:  ACCOUNT NO:   [de-identified]                          ADMISSION DATE:  10/23/2017  PROVIDER:     Shanti Pierre MD    CARDIOVASCULAR DIAGNOSTIC DEPARTMENT    DATE OF STUDY:  10/23/2017    LEXISCAN    INDICATIONS:  Preop. TECHNIQUE:  At rest, the patient was injected with 10.4 mCi of  Cardiolite. Resting images were obtained. The patient was then given  0.4 mg of Lexiscan followed by administration of 31.8 mCi of  Cardiolite. Stress tomographic images were then obtained. Left  ventricular ejection fraction and gated wall motion were acquired. RESULTS:  Resting EKG was sinus rhythm, left bundle-branch block. Right superior axis deviation. No significant ST segment shifts were  noted. IMAGING RESULT:  Review of rest and stress tomographic images revealed  a dilated left ventricle. There is generalized _____ left ventricle  with left ventricular ejection fraction calculated at 28%. TID ratio  is 1.02. IMPRESSION:  1. Dilated cardiomyopathy with left ventricular ejection of 28%. 2.  TID ratio 1.02.  3.  Abnormal resting EKG manifested by left bundle-branch block.         Adiel Gaston MD    D: 10/24/2017 17:36:33       T: 10/24/2017 18:33:32     [id number]  Job#: [id number]     Doc#: [id number]    CC:    Leanne Pete MD)

## 2017-10-30 ENCOUNTER — TELEPHONE (OUTPATIENT)
Dept: FAMILY MEDICINE CLINIC | Age: 82
End: 2017-10-30

## 2017-10-30 NOTE — TELEPHONE ENCOUNTER
Called patient.  LMOM for patient letting him know that he should be taking both medications and to rtn our call to confirm that he received our message

## 2017-10-31 RX ORDER — AMLODIPINE BESYLATE 5 MG/1
5 TABLET ORAL DAILY
Qty: 30 TABLET | Refills: 1 | Status: SHIPPED | OUTPATIENT
Start: 2017-10-31 | End: 2017-11-17 | Stop reason: SDUPTHER

## 2017-10-31 NOTE — TELEPHONE ENCOUNTER
Pt is calling to request a rx refill on 2 meds, Norvasc 5 mg and Metoprolol tartrate 25 mg (both pending) states he is just about out  He has a nurse, Ruben Burch helping him with his medications and she may answer the phone and it is ok to speak with her        Preferred pharmacy  [facility] (old [facility])

## 2017-11-17 ENCOUNTER — OFFICE VISIT (OUTPATIENT)
Dept: FAMILY MEDICINE CLINIC | Age: 82
End: 2017-11-17

## 2017-11-17 VITALS
RESPIRATION RATE: 20 BRPM | HEART RATE: 71 BPM | HEIGHT: 71 IN | BODY MASS INDEX: 22.82 KG/M2 | SYSTOLIC BLOOD PRESSURE: 122 MMHG | TEMPERATURE: 97.3 F | WEIGHT: 163 LBS | DIASTOLIC BLOOD PRESSURE: 72 MMHG

## 2017-11-17 DIAGNOSIS — R53.83 FATIGUE, UNSPECIFIED TYPE: ICD-10-CM

## 2017-11-17 DIAGNOSIS — I50.22 CHRONIC SYSTOLIC CONGESTIVE HEART FAILURE (HCC): ICD-10-CM

## 2017-11-17 DIAGNOSIS — I80.9 THROMBOPHLEBITIS: ICD-10-CM

## 2017-11-17 DIAGNOSIS — K52.9 COLITIS: Primary | ICD-10-CM

## 2017-11-17 PROCEDURE — 99214 OFFICE O/P EST MOD 30 MIN: CPT | Performed by: FAMILY MEDICINE

## 2017-11-17 RX ORDER — ONDANSETRON 4 MG/1
TABLET, FILM COATED ORAL
Qty: 30 TABLET | Refills: 5 | Status: SHIPPED | OUTPATIENT
Start: 2017-11-17 | End: 2019-01-11 | Stop reason: SDUPTHER

## 2017-11-17 RX ORDER — AMLODIPINE BESYLATE 5 MG/1
5 TABLET ORAL DAILY
Qty: 30 TABLET | Refills: 5 | Status: SHIPPED | OUTPATIENT
Start: 2017-11-17 | End: 2017-11-21 | Stop reason: ALTCHOICE

## 2017-11-17 ASSESSMENT — ENCOUNTER SYMPTOMS
SINUS PRESSURE: 0
EYE ITCHING: 0
SHORTNESS OF BREATH: 0
SORE THROAT: 0
EYE DISCHARGE: 0
DIARRHEA: 0
CONSTIPATION: 0
COUGH: 0
ABDOMINAL PAIN: 0

## 2017-11-17 NOTE — PROGRESS NOTES
Subjective  Ivelisse Wills, 80 y.o. male presents today with:  Chief Complaint   Patient presents with    Nodule     Went to the hospital 2 wks ago, where he had his IV injection the area is swollen and hard. No pain reported from the site           HPI    This year with complaints of painful lumps on his right forearm vessels were IVs were meters in the hospital last    No other questions and or concerns for today's visit      Review of Systems   Constitutional: Negative for appetite change, fatigue and fever. HENT: Negative for congestion, ear pain, sinus pressure and sore throat. Eyes: Negative for discharge and itching. Respiratory: Negative for cough and shortness of breath. Cardiovascular: Negative for chest pain and palpitations. Gastrointestinal: Negative for abdominal pain, constipation and diarrhea. Endocrine: Negative for polydipsia. Genitourinary: Negative for difficulty urinating. Musculoskeletal: Negative for gait problem. Skin: Negative. Neurological: Negative for dizziness. Hematological: Negative. Psychiatric/Behavioral: Negative. Past Medical History:   Diagnosis Date    Abnormal finding on EKG 6/13/2016    BPH (benign prostatic hypertrophy)     Cancer (HCC)     GERD (gastroesophageal reflux disease)     Hyperlipidemia     Hypertension     Left bundle branch block 6/13/2016    Osteoarthritis     Sinus bradycardia on ECG 6/13/2016    Type II or unspecified type diabetes mellitus without mention of complication, not stated as uncontrolled      Past Surgical History:   Procedure Laterality Date    TOTAL NEPHRECTOMY  08/12/2016    left kidney, [facility]     Social History     Social History    Marital status:      Spouse name: N/A    Number of children: N/A    Years of education: N/A     Occupational History    Not on file.      Social History Main Topics    Smoking status: Never Smoker    Smokeless tobacco: Never Used AS NEEDED FOR NAUSEA AND VOMITING     Dispense:  30 tablet     Refill:  5     Medications Discontinued During This Encounter   Medication Reason    amLODIPine (NORVASC) 5 MG tablet Reorder    ondansetron (ZOFRAN) 4 MG tablet Reorder     Return in about 2 months (around 1/17/2018).         Controlled Substances Monitoring:          Handy Root MD

## 2017-11-21 ENCOUNTER — OFFICE VISIT (OUTPATIENT)
Dept: CARDIOLOGY | Age: 82
End: 2017-11-21

## 2017-11-21 VITALS
WEIGHT: 165 LBS | BODY MASS INDEX: 23.1 KG/M2 | OXYGEN SATURATION: 98 % | DIASTOLIC BLOOD PRESSURE: 60 MMHG | SYSTOLIC BLOOD PRESSURE: 110 MMHG | HEIGHT: 71 IN | HEART RATE: 71 BPM

## 2017-11-21 DIAGNOSIS — R00.1 SINUS BRADYCARDIA ON ECG: Primary | ICD-10-CM

## 2017-11-21 DIAGNOSIS — I42.0 DILATED CARDIOMYOPATHY (HCC): ICD-10-CM

## 2017-11-21 DIAGNOSIS — I44.7 LEFT BUNDLE BRANCH BLOCK: ICD-10-CM

## 2017-11-21 DIAGNOSIS — I10 ESSENTIAL HYPERTENSION: ICD-10-CM

## 2017-11-21 PROCEDURE — 99214 OFFICE O/P EST MOD 30 MIN: CPT | Performed by: INTERNAL MEDICINE

## 2017-11-21 NOTE — PROGRESS NOTES
Reason For Visit:  Chief Complaint   Patient presents with    Results       HPI:  11/21/17: Patient presents today for Follow-up of cardiomyopathy. Stress test showed dilated cardiomyopathy picture with ejection fraction 25%. He gets short of breath. He is a patient of . Would discontinue Norvasc and losartan. Start him on entresto 24/26 to be taken at night. Increase Lopressor to 1 tablet twice a day. Get a BMP in few weeks and see me in 3. Will probably need a catheterization. 10/13/2017: Patient presents today for Follow-up of abnormal EKG. He has a bundle branch block. He has no angina congestive heart failure. Has diabetes type II and hyperlipidemia that stable. He'll see me in 6 months.       Problem List:  Patient Active Problem List   Diagnosis    Hypertension    Benign prostatic hyperplasia    GERD (gastroesophageal reflux disease)    Osteoarthritis    Type 2 diabetes mellitus without complication (HCC)    Hyperlipidemia    [facility]    Sinus bradycardia on ECG    Left bundle branch block    Abnormal finding on EKG    Primary bladder malignant neoplasm ([facility]    Hypogonadism in male       Allergies: Allergies   Allergen Reactions    Morphine      Other reaction(s): Delirium       Personal History:   has a past medical history of Abnormal finding on EKG; BPH (benign prostatic hypertrophy); Cancer [facility]); GERD (gastroesophageal reflux disease); Hyperlipidemia; Hypertension; Left bundle branch block; Osteoarthritis; Sinus bradycardia on ECG; and Type II or unspecified type diabetes mellitus without mention of complication, not stated as uncontrolled. Social History:   reports that he has never smoked.  He has never used smokeless tobacco.    Surgical History:  Past Surgical History:   Procedure Laterality Date    TOTAL NEPHRECTOMY  08/12/2016    left kidney, Dr. Logan [facility]       Family History:  family history includes Heart Attack in his brother and Normal rate, regular rhythm, normal heart sounds and intact distal pulses. Pulmonary/Chest: Effort normal and breath sounds normal.   Abdominal: Soft. Bowel sounds are normal.   Musculoskeletal: Normal range of motion. Neurological: He is alert and oriented to person, place, and time. He has normal reflexes. Skin: Skin is warm and dry. Psychiatric: He has a normal mood and affect. His behavior is normal. Judgment and thought content normal.           Assessment/Orders:     ICD-10-CM ICD-9-CM    1. Sinus bradycardia on ECG R00.1 589.24 Basic Metabolic Panel   2. Left bundle branch block I44.7 356.1 Basic Metabolic Panel   3. Essential hypertension I10 389.1 Basic Metabolic Panel       Orders Placed This Encounter   Medications    sacubitril-valsartan (ENTRESTO) 24-26 MG per tablet     Sig: Take 1 tablet by mouth 2 times daily     Dispense:  56 tablet     Refill:  0    metoprolol tartrate (LOPRESSOR) 25 MG tablet     Sig: Take 1 tablet by mouth 2 times daily (hold for sbp<100 or hr <60)     Dispense:  180 tablet     Refill:  3       Medications Discontinued During This Encounter   Medication Reason    amLODIPine (NORVASC) 5 MG tablet Therapy completed    losartan (COZAAR) 25 MG tablet Therapy completed    metoprolol tartrate (LOPRESSOR) 25 MG tablet Reorder       Orders Placed This Encounter   Procedures    Basic Metabolic Panel     Standing Status:   Future     Standing Expiration Date:   11/21/2018         Plan:  1. Patient to discontinue amlodipine and losartan; increase metoprolol tartrate 25 mg to 1 tablet by mouth twice daily  2. Get BMP in 1 week. 2. See me in 2 weeks. > I will continue to monitor patient clinically, if symptoms develop or worsen, they are to let me know ASAP or head to the nearest emergeny room. > Follow up as discussed or call office sooner if needed.  > If refills are needed after appointment contact pharmacy.       Electronically signed by: Polo Sutherland MD  11/21/2017 1:14 PM

## 2017-12-05 ENCOUNTER — OFFICE VISIT (OUTPATIENT)
Dept: CARDIOLOGY | Age: 82
End: 2017-12-05

## 2017-12-05 VITALS
RESPIRATION RATE: 16 BRPM | DIASTOLIC BLOOD PRESSURE: 60 MMHG | OXYGEN SATURATION: 96 % | WEIGHT: 166.4 LBS | HEART RATE: 71 BPM | BODY MASS INDEX: 23.21 KG/M2 | SYSTOLIC BLOOD PRESSURE: 128 MMHG

## 2017-12-05 DIAGNOSIS — I42.8 OTHER CARDIOMYOPATHY (HCC): Primary | ICD-10-CM

## 2017-12-05 DIAGNOSIS — R94.30 CARDIAC LV EJECTION FRACTION 30-35%: ICD-10-CM

## 2017-12-05 DIAGNOSIS — I10 ESSENTIAL HYPERTENSION: ICD-10-CM

## 2017-12-05 DIAGNOSIS — I44.7 LEFT BUNDLE BRANCH BLOCK: ICD-10-CM

## 2017-12-05 DIAGNOSIS — R94.31 ABNORMAL FINDING ON EKG: ICD-10-CM

## 2017-12-05 DIAGNOSIS — R00.1 SINUS BRADYCARDIA ON ECG: ICD-10-CM

## 2017-12-05 PROCEDURE — 99214 OFFICE O/P EST MOD 30 MIN: CPT | Performed by: INTERNAL MEDICINE

## 2017-12-05 RX ORDER — LOSARTAN POTASSIUM 25 MG/1
25 TABLET ORAL DAILY
COMMUNITY
End: 2018-01-22 | Stop reason: SDUPTHER

## 2017-12-05 ASSESSMENT — ENCOUNTER SYMPTOMS
APNEA: 0
CHEST TIGHTNESS: 0
SHORTNESS OF BREATH: 0

## 2017-12-05 NOTE — PROGRESS NOTES
Reason For Visit:  Chief Complaint   Patient presents with    Medication Check     ENTRESTO-unable to tolerate    Results     BMP NOT DONE       HPI:  12/5/2017: Patient presents today for follow-up of cardiomyopathy. Could not tolerate entresto. Got dizzy. He is on Lopressor twice a day Aldactone and losartan. We'll schedule him to have a left heart catheterization coronary angiography with Dr. Eb Mari. High likelihood of coronary artery disease causing diminished left ventricular ejection fraction. See me in 2 weeks. 11/21/17: Patient presents today for Follow-up of cardiomyopathy. Stress test showed dilated cardiomyopathy picture with ejection fraction 25%. He gets short of breath. He is a patient of . Would discontinue Norvasc and losartan. Start him on entresto 24/26 to be taken at night. Increase Lopressor to 1 tablet twice a day. Get a BMP in few weeks and see me in 3. Will probably need a catheterization.     10/13/2017: Patient presents today for Follow-up of abnormal EKG. He has a bundle branch block. He has no angina congestive heart failure. Has diabetes type II and hyperlipidemia that stable. He'll see me in 6 months. Problem List:  Patient Active Problem List   Diagnosis    Hypertension    Benign prostatic hyperplasia    GERD (gastroesophageal reflux disease)    Osteoarthritis    Type 2 diabetes mellitus without complication (HCC)    Hyperlipidemia    Bladder cancer (HCC)    Sinus bradycardia on ECG    Left bundle branch block    Abnormal finding on EKG    Primary bladder malignant neoplasm (HCC)    Hypogonadism in male       Allergies: Allergies   Allergen Reactions    Morphine      Other reaction(s): Delirium       Personal History:   has a past medical history of Abnormal finding on EKG; BPH (benign prostatic hypertrophy); Cancer [facility]); GERD (gastroesophageal reflux disease); Hyperlipidemia; Hypertension; Left bundle branch block; Osteoarthritis;  Sinus bradycardia on ECG; and Type II or unspecified type diabetes mellitus without mention of complication, not stated as uncontrolled. Social History:   reports that he has never smoked. He has never used smokeless tobacco.    Surgical History:  Past Surgical History:   Procedure Laterality Date    TOTAL NEPHRECTOMY  08/12/2016    [facility]       Family History:  family history includes Heart Attack in his brother and father. Current Medications:    Current Outpatient Prescriptions:     losartan (COZAAR) 25 MG tablet, Take 25 mg by mouth daily, Disp: , Rfl:     metoprolol tartrate (LOPRESSOR) 25 MG tablet, Take 1 tablet by mouth 2 times daily (hold for sbp<100 or hr <60), Disp: 180 tablet, Rfl: 3    ondansetron (ZOFRAN) 4 MG tablet, TAKE 1 TO 2 TABLETS EVERY 4 TO 6 HOURS AS NEEDED FOR NAUSEA AND VOMITING, Disp: 30 tablet, Rfl: 5    docusate sodium (COLACE) 100 MG capsule, Take 100 mg by mouth 2 times daily, Disp: , Rfl:     simvastatin (ZOCOR) 20 MG tablet, Take 20 mg by mouth nightly, Disp: , Rfl:     testosterone cypionate (DEPOTESTOTERONE CYPIONATE) 200 MG/ML injection, Inject 200 mg into the muscle every 30 days, Disp: , Rfl:     omeprazole (PRILOSEC) 40 MG delayed release capsule, TAKE 1 CAPSULE DAILY, Disp: 90 capsule, Rfl: 1    tamsulosin (FLOMAX) 0.4 MG capsule, TAKE 1 CAPSULE DAILY, Disp: 90 capsule, Rfl: 1    aspirin 81 MG EC tablet, Take 81 mg by mouth daily, Disp: , Rfl:     bimatoprost (LUMIGAN) 0.01 % SOLN ophthalmic drops, Place 1 drop into both eyes nightly 1 drop both eyes daily at bedtime, Disp: , Rfl:     Multiple Vitamin (MULTIVITAMIN PO), Take  by mouth.  , Disp: , Rfl:       Review of Systems:  Review of Systems   Constitutional: Negative for appetite change and fatigue. Respiratory: Negative for apnea, chest tightness and shortness of breath. Cardiovascular: Negative for chest pain, palpitations and leg swelling.    Neurological: Negative for dizziness, syncope, weakness, light-headedness and headaches. Hematological: Does not bruise/bleed easily. Psychiatric/Behavioral: Negative for agitation, behavioral problems and confusion. The patient is not nervous/anxious and is not hyperactive. All other systems reviewed and are negative. Objective  Vitals:    12/05/17 1418   BP: 128/60   Site: Left Arm   Position: Sitting   Cuff Size: Large Adult   Pulse: 71   Resp: 16   SpO2: 96%   Weight: 166 lb 6.4 oz (75.5 kg)         Physical Exam:  Physical Exam   Constitutional: He is oriented to person, place, and time. He appears well-developed and well-nourished. HENT:   Head: Normocephalic and atraumatic. Right Ear: External ear normal.   Left Ear: External ear normal.   Mouth/Throat: Oropharynx is clear and moist.   Eyes: Conjunctivae and EOM are normal. Pupils are equal, round, and reactive to light. Right eye exhibits no discharge. Left eye exhibits no discharge. No scleral icterus. Neck: Normal range of motion. Neck supple. No JVD present. No tracheal deviation present. No thyromegaly present. Cardiovascular: Normal rate, regular rhythm and intact distal pulses. Exam reveals gallop. Pulmonary/Chest: Effort normal and breath sounds normal. No respiratory distress. He has no wheezes. He has no rales. He exhibits no tenderness. Abdominal: Soft. Bowel sounds are normal. He exhibits no distension and no mass. There is no tenderness. There is no rebound and no guarding. Musculoskeletal: Normal range of motion. He exhibits tenderness. He exhibits no edema. Lymphadenopathy:     He has no cervical adenopathy. Neurological: He is alert and oriented to person, place, and time. He has normal reflexes. Skin: Skin is warm and dry. Psychiatric: He has a normal mood and affect. His behavior is normal. Judgment and thought content normal.           Assessment/Orders:     ICD-10-CM ICD-9-CM    1.  [facility] I42.8 425.4 LEFT HEART CATH   2. Cardiac LV ejection fraction 30-35% R94.30 785.9 LEFT HEART CATH   3. Sinus bradycardia on ECG R00.1 427.89 LEFT HEART CATH   4. Left bundle branch block I44.7 426.3 LEFT HEART CATH   5. Abnormal finding on EKG R94.31 794.31 LEFT HEART CATH   6. Essential hypertension I10 401.9 LEFT HEART CATH       No orders of the defined types were placed in this encounter. Medications Discontinued During This Encounter   Medication Reason    sacubitril-valsartan (ENTRESTO) 24-26 MG per tablet Side effects       No orders of the defined types were placed in this encounter. Plan:  1. Patient to get [address] [place] [address] 12/7/17 by Dr. Jimmie Samuel. 2. See me in 2 weeks. > I will continue to monitor patient clinically, if symptoms develop or worsen, they are to let me know ASAP or head to the nearest emergeny room. > Follow up as discussed or call office sooner if needed.  > If refills are needed after appointment contact pharmacy.       Electronically signed by: Kentrell Mckeon MD  12/5/2017 2:38 PM

## 2017-12-06 ENCOUNTER — TELEPHONE (OUTPATIENT)
Dept: CARDIOLOGY | Age: 82
End: 2017-12-06

## 2017-12-06 NOTE — TELEPHONE ENCOUNTER
PT. Deidre Gallegos 1615 12/7/17--HAS BEEN CANCELLED--AWAITING APPROVAL FROM [employer]--PT.  AWARE AND CATH LAB AWARE--WILL SCHEDULE WHEN APPROVAL IS RECEIVED

## 2017-12-08 ENCOUNTER — HOSPITAL ENCOUNTER (OUTPATIENT)
Dept: CARDIAC CATH/INVASIVE PROCEDURES | Age: 82
Discharge: HOME | End: 2017-12-08
Attending: INTERNAL MEDICINE | Admitting: INTERNAL MEDICINE
Payer: MEDICARE

## 2017-12-08 VITALS
HEART RATE: 56 BPM | BODY MASS INDEX: 23.34 KG/M2 | TEMPERATURE: 97.7 F | WEIGHT: 163 LBS | RESPIRATION RATE: 18 BRPM | HEIGHT: 70 IN

## 2017-12-08 LAB
ANION GAP SERPL CALCULATED.3IONS-SCNC: 14 MEQ/L (ref 7–13)
BUN BLDV-MCNC: 31 MG/DL (ref 8–23)
CALCIUM SERPL-MCNC: 8.9 MG/DL (ref 8.6–10.2)
CHLORIDE BLD-SCNC: 103 MEQ/L (ref 98–107)
CO2: 25 MEQ/L (ref 22–29)
CREAT SERPL-MCNC: 1.17 MG/DL (ref 0.7–1.2)
EKG ATRIAL RATE: 57 BPM
EKG P AXIS: 37 DEGREES
EKG P-R INTERVAL: 146 MS
EKG Q-T INTERVAL: 508 MS
EKG QRS DURATION: 158 MS
EKG QTC CALCULATION (BAZETT): 494 MS
EKG R AXIS: 44 DEGREES
EKG T AXIS: 17 DEGREES
EKG VENTRICULAR RATE: 57 BPM
GFR AFRICAN AMERICAN: >60
GFR NON-AFRICAN AMERICAN: 59.5
GLUCOSE BLD-MCNC: 104 MG/DL (ref 74–109)
HCT VFR BLD CALC: 36.7 % (ref 42–52)
HEMOGLOBIN: 12.9 G/DL (ref 14–18)
INR BLD: 1
MCH RBC QN AUTO: 32.3 PG (ref 27–31.3)
MCHC RBC AUTO-ENTMCNC: 35.2 % (ref 33–37)
MCV RBC AUTO: 91.9 FL (ref 80–100)
PDW BLD-RTO: 14.2 % (ref 11.5–14.5)
PLATELET # BLD: 196 K/UL (ref 130–400)
POTASSIUM SERPL-SCNC: 4.5 MEQ/L (ref 3.5–5.1)
PROTHROMBIN TIME: 10.4 SEC (ref 8.1–13.7)
RBC # BLD: 3.99 M/UL (ref 4.7–6.1)
SODIUM BLD-SCNC: 142 MEQ/L (ref 132–144)
WBC # BLD: 7 K/UL (ref 4.8–10.8)

## 2017-12-08 PROCEDURE — C1725 CATH, TRANSLUMIN NON-LASER: HCPCS

## 2017-12-08 PROCEDURE — 2580000003 HC RX 258: Performed by: INTERNAL MEDICINE

## 2017-12-08 PROCEDURE — 93458 L HRT ARTERY/VENTRICLE ANGIO: CPT | Performed by: INTERNAL MEDICINE

## 2017-12-08 PROCEDURE — 2500000003 HC RX 250 WO HCPCS

## 2017-12-08 PROCEDURE — 93005 ELECTROCARDIOGRAM TRACING: CPT

## 2017-12-08 PROCEDURE — C1769 GUIDE WIRE: HCPCS

## 2017-12-08 PROCEDURE — 80048 BASIC METABOLIC PNL TOTAL CA: CPT

## 2017-12-08 PROCEDURE — 2580000003 HC RX 258

## 2017-12-08 PROCEDURE — 85610 PROTHROMBIN TIME: CPT

## 2017-12-08 PROCEDURE — 6360000002 HC RX W HCPCS

## 2017-12-08 PROCEDURE — 85027 COMPLETE CBC AUTOMATED: CPT

## 2017-12-08 PROCEDURE — C1894 INTRO/SHEATH, NON-LASER: HCPCS

## 2017-12-08 RX ORDER — ACETAMINOPHEN 325 MG/1
650 TABLET ORAL EVERY 4 HOURS PRN
Status: DISCONTINUED | OUTPATIENT
Start: 2017-12-08 | End: 2017-12-08 | Stop reason: HOSPADM

## 2017-12-08 RX ORDER — ONDANSETRON 2 MG/ML
4 INJECTION INTRAMUSCULAR; INTRAVENOUS EVERY 6 HOURS PRN
Status: DISCONTINUED | OUTPATIENT
Start: 2017-12-08 | End: 2017-12-08 | Stop reason: HOSPADM

## 2017-12-08 RX ORDER — ALPRAZOLAM 0.5 MG/1
0.5 TABLET ORAL
Status: DISCONTINUED | OUTPATIENT
Start: 2017-12-08 | End: 2017-12-08 | Stop reason: HOSPADM

## 2017-12-08 RX ORDER — SODIUM CHLORIDE 9 MG/ML
INJECTION, SOLUTION INTRAVENOUS CONTINUOUS
Status: DISCONTINUED | OUTPATIENT
Start: 2017-12-08 | End: 2017-12-08 | Stop reason: HOSPADM

## 2017-12-08 RX ORDER — AMLODIPINE BESYLATE 5 MG/1
5 TABLET ORAL DAILY
Status: ON HOLD | COMMUNITY
End: 2018-01-25

## 2017-12-08 RX ADMIN — SODIUM CHLORIDE: 9 INJECTION, SOLUTION INTRAVENOUS at 08:46

## 2017-12-08 NOTE — PROGRESS NOTES
Arrived to pre/post cath from home. Family in waiting area. Name and date of birth verified along with allergies. Consents for procedure obtained.   Oriented to surroundings and rights and responsibility handout given to patient

## 2017-12-08 NOTE — PROGRESS NOTES
Prior note was a late entry that was done at 11:45 but I forgot to sign.   Patient is eating a boxed lunch and taking fluids at this time

## 2017-12-12 ENCOUNTER — TELEPHONE (OUTPATIENT)
Dept: CARDIOLOGY | Age: 82
End: 2017-12-12

## 2017-12-12 NOTE — TELEPHONE ENCOUNTER
BP IS RUNNING UPPER 140'S UPPER 80\". SHOULD.  HE GO BACK ON [facility]

## 2017-12-13 ENCOUNTER — TELEPHONE (OUTPATIENT)
Dept: CARDIOLOGY | Age: 82
End: 2017-12-13

## 2017-12-14 ENCOUNTER — CARE COORDINATION (OUTPATIENT)
Dept: CARE COORDINATION | Age: 82
End: 2017-12-14

## 2017-12-21 ENCOUNTER — CARE COORDINATION (OUTPATIENT)
Dept: CARE COORDINATION | Age: 82
End: 2017-12-21

## 2017-12-21 NOTE — CARE COORDINATION
Spoke with [facility]. States he needs help to understand his medications. Has a friend Jeny Mcmahon helping hime with meds but she is not available to talk to [facility] until Tuesday. She is currently managing his medications but patient states he needs to know how to do this himself. Patient requested [facility] to talk to Jeny Mcmahon and get list of meds. Patient provided with [facility] number so Jeny Mcmahon can call [facility]. Patient correctly states he has a fu with heart doctor tomorrow per [facility] schedule.

## 2017-12-22 ENCOUNTER — OFFICE VISIT (OUTPATIENT)
Dept: CARDIOLOGY | Age: 82
End: 2017-12-22

## 2017-12-22 VITALS
DIASTOLIC BLOOD PRESSURE: 70 MMHG | WEIGHT: 165.2 LBS | SYSTOLIC BLOOD PRESSURE: 112 MMHG | OXYGEN SATURATION: 96 % | HEART RATE: 59 BPM | BODY MASS INDEX: 23.65 KG/M2 | HEIGHT: 70 IN | RESPIRATION RATE: 12 BRPM | TEMPERATURE: 97.7 F

## 2017-12-22 DIAGNOSIS — I44.7 LEFT BUNDLE BRANCH BLOCK: Primary | ICD-10-CM

## 2017-12-22 DIAGNOSIS — E78.5 HYPERLIPIDEMIA, UNSPECIFIED HYPERLIPIDEMIA TYPE: ICD-10-CM

## 2017-12-22 DIAGNOSIS — I10 ESSENTIAL HYPERTENSION: ICD-10-CM

## 2017-12-22 PROCEDURE — 99214 OFFICE O/P EST MOD 30 MIN: CPT | Performed by: INTERNAL MEDICINE

## 2017-12-22 NOTE — PROGRESS NOTES
his brother and father. Current Medications:    Current Outpatient Prescriptions:     amLODIPine (NORVASC) 5 MG tablet, Take 5 mg by mouth daily On hold, Disp: , Rfl:     losartan (COZAAR) 25 MG tablet, Take 25 mg by mouth daily, Disp: , Rfl:     metoprolol tartrate (LOPRESSOR) 25 MG tablet, Take 1 tablet by mouth 2 times daily (hold for sbp<100 or hr <60), Disp: 180 tablet, Rfl: 3    ondansetron (ZOFRAN) 4 MG tablet, TAKE 1 TO 2 TABLETS EVERY 4 TO 6 HOURS AS NEEDED FOR NAUSEA AND VOMITING (Patient taking differently: Take 4 mg by mouth every 8 hours as needed for Nausea or Vomiting TAKE 1 TO 2 TABLETS EVERY 4 TO 6 HOURS AS NEEDED FOR NAUSEA AND VOMITING), Disp: 30 tablet, Rfl: 5    simvastatin (ZOCOR) 20 MG tablet, Take 20 mg by mouth nightly, Disp: , Rfl:     testosterone cypionate (DEPOTESTOTERONE CYPIONATE) 200 MG/ML injection, Inject 200 mg into the muscle every 30 days, Disp: , Rfl:     omeprazole (PRILOSEC) 40 MG delayed release capsule, TAKE 1 CAPSULE DAILY (Patient taking differently: Take 40 mg by mouth daily TAKE 1 CAPSULE DAILY), Disp: 90 capsule, Rfl: 1    tamsulosin (FLOMAX) 0.4 MG capsule, TAKE 1 CAPSULE DAILY (Patient taking differently: Take 0.4 mg by mouth daily TAKE 1 CAPSULE DAILY), Disp: 90 capsule, Rfl: 1    aspirin 81 MG EC tablet, Take 81 mg by mouth daily, Disp: , Rfl:     bimatoprost (LUMIGAN) 0.01 % SOLN ophthalmic drops, Place 1 drop into both eyes nightly 1 drop both eyes daily at bedtime, Disp: , Rfl:     Multiple Vitamin (MULTIVITAMIN PO), Take by mouth daily , Disp: , Rfl:       Review of Systems:  Review of Systems   All other systems reviewed and are negative.         Objective  Vitals:    12/22/17 1207   BP: 112/70   Site: Right Arm   Position: Sitting   Cuff Size: Medium Adult   Pulse: 59   Resp: 12   Temp: 97.7 °F (36.5 °C)   TempSrc: Temporal   SpO2: 96%   Weight: 165 lb 3.2 oz (74.9 kg)   Height: 5' 9.5\" (1.765 m)         Physical Exam:  Physical Exam

## 2017-12-26 ENCOUNTER — CARE COORDINATION (OUTPATIENT)
Dept: CARE COORDINATION | Age: 82
End: 2017-12-26

## 2017-12-26 DIAGNOSIS — R10.9 ABDOMINAL PAIN, UNSPECIFIED ABDOMINAL LOCATION: Primary | ICD-10-CM

## 2017-12-26 DIAGNOSIS — I10 ESSENTIAL (PRIMARY) HYPERTENSION: ICD-10-CM

## 2018-01-15 ENCOUNTER — CARE COORDINATION (OUTPATIENT)
Dept: CARE COORDINATION | Age: 83
End: 2018-01-15

## 2018-01-15 RX ORDER — TAMSULOSIN HYDROCHLORIDE 0.4 MG/1
CAPSULE ORAL
Qty: 30 CAPSULE | Refills: 1 | OUTPATIENT
Start: 2018-01-15 | End: 2018-04-19 | Stop reason: SDUPTHER

## 2018-01-15 RX ORDER — TAMSULOSIN HYDROCHLORIDE 0.4 MG/1
CAPSULE ORAL
Qty: 90 CAPSULE | Refills: 0 | Status: SHIPPED | OUTPATIENT
Start: 2018-01-15 | End: 2018-01-22 | Stop reason: SDUPTHER

## 2018-01-15 RX ORDER — CEFUROXIME AXETIL 500 MG/1
500 TABLET ORAL 2 TIMES DAILY
Status: ON HOLD | COMMUNITY
Start: 2018-01-13 | End: 2018-01-25

## 2018-01-19 RX ORDER — OMEPRAZOLE 40 MG/1
CAPSULE, DELAYED RELEASE ORAL
Qty: 90 CAPSULE | Refills: 1 | Status: SHIPPED | OUTPATIENT
Start: 2018-01-19 | End: 2018-03-19

## 2018-01-22 ENCOUNTER — CARE COORDINATOR VISIT (OUTPATIENT)
Dept: CARE COORDINATION | Age: 83
End: 2018-01-22

## 2018-01-22 ENCOUNTER — OFFICE VISIT (OUTPATIENT)
Dept: FAMILY MEDICINE CLINIC | Age: 83
End: 2018-01-22
Payer: MEDICARE

## 2018-01-22 VITALS
DIASTOLIC BLOOD PRESSURE: 72 MMHG | HEIGHT: 70 IN | HEART RATE: 60 BPM | BODY MASS INDEX: 23.91 KG/M2 | WEIGHT: 167 LBS | SYSTOLIC BLOOD PRESSURE: 132 MMHG | TEMPERATURE: 97.4 F | RESPIRATION RATE: 16 BRPM

## 2018-01-22 DIAGNOSIS — N40.0 BENIGN PROSTATIC HYPERPLASIA WITHOUT LOWER URINARY TRACT SYMPTOMS: ICD-10-CM

## 2018-01-22 DIAGNOSIS — E78.5 HYPERLIPIDEMIA, UNSPECIFIED HYPERLIPIDEMIA TYPE: ICD-10-CM

## 2018-01-22 DIAGNOSIS — I10 ESSENTIAL HYPERTENSION: ICD-10-CM

## 2018-01-22 DIAGNOSIS — I48.91 ATRIAL FIBRILLATION, UNSPECIFIED TYPE (HCC): Primary | ICD-10-CM

## 2018-01-22 DIAGNOSIS — J11.1 INFLUENZA: ICD-10-CM

## 2018-01-22 DIAGNOSIS — Z09 HOSPITAL DISCHARGE FOLLOW-UP: ICD-10-CM

## 2018-01-22 DIAGNOSIS — E11.9 TYPE 2 DIABETES MELLITUS WITHOUT COMPLICATION, WITHOUT LONG-TERM CURRENT USE OF INSULIN (HCC): ICD-10-CM

## 2018-01-22 LAB
CHOLESTEROL, TOTAL: 123 MG/DL (ref 0–199)
HDLC SERPL-MCNC: 24 MG/DL (ref 40–59)
LDL CHOLESTEROL CALCULATED: 47 MG/DL (ref 0–129)
PROSTATE SPECIFIC ANTIGEN: 2.87 NG/ML (ref 0–6.22)
TRIGL SERPL-MCNC: 261 MG/DL (ref 0–200)

## 2018-01-22 PROCEDURE — 99214 OFFICE O/P EST MOD 30 MIN: CPT | Performed by: FAMILY MEDICINE

## 2018-01-22 RX ORDER — LOSARTAN POTASSIUM 25 MG/1
25 TABLET ORAL DAILY
Qty: 90 TABLET | Refills: 1 | Status: SHIPPED | OUTPATIENT
Start: 2018-01-22 | End: 2018-08-13 | Stop reason: SDUPTHER

## 2018-01-22 RX ORDER — SIMVASTATIN 20 MG
20 TABLET ORAL NIGHTLY
Qty: 90 TABLET | Refills: 3 | Status: SHIPPED | OUTPATIENT
Start: 2018-01-22 | End: 2018-08-13 | Stop reason: SDUPTHER

## 2018-01-22 ASSESSMENT — ENCOUNTER SYMPTOMS
EYE ITCHING: 0
COUGH: 0
CONSTIPATION: 0
DIARRHEA: 0
EYE DISCHARGE: 0
ABDOMINAL PAIN: 0
SORE THROAT: 0
SINUS PRESSURE: 0
SHORTNESS OF BREATH: 0

## 2018-01-22 NOTE — PROGRESS NOTES
Subjective  Lani Getting, 80 y.o. male presents today with:  Chief Complaint   Patient presents with    [facility] from [facility] 01/13/18- Simvastatin was decreased to 20 mg from 40 mg. The hospital DX: A-fib, Influenza- rx'ed Tamiflu and Zpak    Hypertension     Lopressor was increased to tid    Results     Echocardiogram done @ [facility] 01/11/18. States the he is following up Dr. Kelechi Alston 01/25/17 for angioplasty or stents           HPI    Patient in for follow-up of hospitalization for influenza and the paroxysmal A. fib. No other questions and or concerns for today's visit      Review of Systems   Constitutional: Negative for appetite change, fatigue and fever. HENT: Negative for congestion, ear pain, sinus pressure and sore throat. Eyes: Negative for discharge and itching. Respiratory: Negative for cough and shortness of breath. Cardiovascular: Negative for chest pain and palpitations. Gastrointestinal: Negative for abdominal pain, constipation and diarrhea. Endocrine: Negative for polydipsia. Genitourinary: Negative for difficulty urinating. Musculoskeletal: Positive for arthralgias and myalgias. Negative for gait problem. Skin: Negative. Neurological: Negative for dizziness. Hematological: Negative. Psychiatric/Behavioral: Negative.           Past Medical History:   Diagnosis Date    Abnormal finding on EKG 6/13/2016    Atrial fibrillation (HCC) 1/22/2018    BPH (benign prostatic hypertrophy)     Cancer (HCC)     GERD (gastroesophageal reflux disease)     Hyperlipidemia     Hypertension     Left bundle branch block 6/13/2016    Osteoarthritis     Sinus bradycardia on ECG 6/13/2016    Type II or unspecified type diabetes mellitus without mention of complication, not stated as uncontrolled      Past Surgical History:   Procedure Laterality Date    TOTAL NEPHRECTOMY  08/12/2016    left kidney, Dr. Sanchez Nurse History    Marital status:      Spouse name: N/A    Number of children: N/A    Years of education: N/A     Occupational History    Not on file.      Social History Main Topics    Smoking status: Never Smoker    Smokeless tobacco: Never Used    Alcohol use Not on file    Drug use: Unknown    Sexual activity: Not on file     Other Topics Concern    Not on file     Social History Narrative    No narrative on file     Family History   Problem Relation Age of Onset    Heart Attack Father     Heart Attack Brother      Allergies   Allergen Reactions    Morphine      Other reaction(s): Delirium     Current Outpatient Prescriptions   Medication Sig Dispense Refill    omeprazole (PRILOSEC) 40 MG delayed release capsule TAKE 1 CAPSULE DAILY 90 capsule 1    tamsulosin (FLOMAX) 0.4 MG capsule TAKE 1 CAPSULE ONCE DAILY 90 capsule 0    tamsulosin (FLOMAX) 0.4 MG capsule TAKE 1 CAPSULE DAILY 30 capsule 1    cefUROXime (CEFTIN) 500 MG tablet Take 500 mg by mouth 2 times daily finish all medication      amLODIPine (NORVASC) 5 MG tablet Take 5 mg by mouth daily On hold      losartan (COZAAR) 25 MG tablet Take 25 mg by mouth daily      metoprolol tartrate (LOPRESSOR) 25 MG tablet Take 1 tablet by mouth 2 times daily (hold for sbp<100 or hr <60) (Patient taking differently: Take 25 mg by mouth 3 times daily (hold for sbp<100 or hr <60)) 180 tablet 3    ondansetron (ZOFRAN) 4 MG tablet TAKE 1 TO 2 TABLETS EVERY 4 TO 6 HOURS AS NEEDED FOR NAUSEA AND VOMITING (Patient taking differently: Take 4 mg by mouth every 8 hours as needed for Nausea or Vomiting TAKE 1 TO 2 TABLETS EVERY 4 TO 6 HOURS AS NEEDED FOR NAUSEA AND VOMITING) 30 tablet 5    simvastatin (ZOCOR) 20 MG tablet Take 20 mg by mouth nightly      testosterone cypionate (DEPOTESTOTERONE CYPIONATE) 200 MG/ML injection Inject 200 mg into the muscle every 30 days      aspirin 81 MG EC tablet Take 81 mg by mouth daily      bimatoprost (LUMIGAN) 0.01 %

## 2018-01-24 NOTE — CARE COORDINATION
Ambulatory Care Coordination Note  1/24/2018  CM Risk Score: 5  Adriane Mortality Risk Score: 37.21    [facility]: Brandon Ortiz, RN    Summary Note: Patient seen in office prior to PCP vs. States has to follow up with heart doctor. , needs to have a echo. States taking medications, takes from med-minder prefilled by neighbor. Denies chest pain. Has had no falls, no dizziness. Is not using a device. See education notes. Care Coordination Interventions    Program Enrollment:  Rising Risk  Referral from Primary Care Provider:  No  Suggested Interventions and Community Resources  Fall Risk Prevention:  Completed  Home Health Services:  Completed (Comment: does not meet criteria; patient is not homebound)  Meals on Wheels:  Declined (Comment: states can fix own meals, sometimes goes out to eat with friends, Orlando Campbell also brings meals)  Medi Set or Pill Pack:  Completed (Comment: states neighbor is a nurse and helps him manage meds,)  Senior Services:  Declined  Zone Management Tools: In Process (Comment: Diabetes)         Goals Addressed     None          Prior to Admission medications    Medication Sig Start Date End Date Taking?  Authorizing Provider   losartan (COZAAR) 25 MG tablet Take 1 tablet by mouth daily 1/22/18   Mele Lovell MD   simvastatin (ZOCOR) 20 MG tablet Take 1 tablet by mouth nightly 1/22/18   Mele Lovell MD   omeprazole (PRILOSEC) 40 MG delayed release capsule TAKE 1 CAPSULE DAILY 1/19/18   Jessica Leslie DO   tamsulosin (FLOMAX) 0.4 MG capsule TAKE 1 CAPSULE DAILY 1/15/18   Mare Mcnamara MD   cefUROXime (CEFTIN) 500 MG tablet Take 500 mg by mouth 2 times daily finish all medication 1/13/18   Historical Provider, MD   amLODIPine (NORVASC) 5 MG tablet Take 5 mg by mouth daily On hold    Historical Provider, MD   metoprolol tartrate (LOPRESSOR) 25 MG tablet Take 1 tablet by mouth 2 times daily (hold for sbp<100 or hr <60)  Patient taking differently: Take 25 mg by mouth 3 times daily (hold for sbp<100 or hr <60) 11/21/17   Nancy Maguire MD   ondansetron (ZOFRAN) 4 MG tablet TAKE 1 TO 2 TABLETS EVERY 4 TO 6 HOURS AS NEEDED FOR NAUSEA AND VOMITING  Patient taking differently: Take 4 mg by mouth every 8 hours as needed for Nausea or Vomiting TAKE 1 TO 2 TABLETS EVERY 4 TO 6 HOURS AS NEEDED FOR NAUSEA AND VOMITING 11/17/17   Kentrell Tracy MD   testosterone cypionate (DEPOTESTOTERONE CYPIONATE) 200 MG/ML injection Inject 200 mg into the muscle every 30 days    Historical Provider, MD   aspirin 81 MG EC tablet Take 81 mg by mouth daily 2/22/17   Historical Provider, MD   bimatoprost (LUMIGAN) 0.01 % SOLN ophthalmic drops Place 1 drop into both eyes nightly 1 drop both eyes daily at bedtime    Historical Provider, MD   Multiple Vitamin (MULTIVITAMIN PO) Take by mouth daily     Historical Provider, MD       Future Appointments  Date Time Provider Ben Winter   1/25/2018 8:00 AM [facility]   3/19/2018 9:45 AM Kentrell Tracy MD [facility]     General Assessment    Do you have any symptoms that are causing concern?:  No      and   Diabetes Assessment    Medic Alert ID:  No  Meal Planning:  Avoidance of concentrated sweets   How often do you test your blood sugar?:  No Testing   Do you have barriers with adherence to non-pharmacologic self-management interventions?  (Nutrition/Exercise/Self-Monitoring):  No   Have you ever had to go to the ED for symptoms of low blood sugar?:  No       No patient-reported symptoms      ,

## 2018-01-25 ENCOUNTER — HOSPITAL ENCOUNTER (OUTPATIENT)
Dept: CARDIAC CATH/INVASIVE PROCEDURES | Age: 83
Setting detail: OUTPATIENT SURGERY
Discharge: HOME | End: 2018-01-25
Attending: INTERNAL MEDICINE | Admitting: INTERNAL MEDICINE
Payer: MEDICARE

## 2018-01-25 VITALS
DIASTOLIC BLOOD PRESSURE: 63 MMHG | SYSTOLIC BLOOD PRESSURE: 105 MMHG | BODY MASS INDEX: 23.34 KG/M2 | TEMPERATURE: 98 F | RESPIRATION RATE: 15 BRPM | HEART RATE: 60 BPM | HEIGHT: 70 IN | OXYGEN SATURATION: 99 % | WEIGHT: 163 LBS

## 2018-01-25 DIAGNOSIS — I10 ESSENTIAL (PRIMARY) HYPERTENSION: ICD-10-CM

## 2018-01-25 DIAGNOSIS — R10.9 ABDOMINAL PAIN, UNSPECIFIED ABDOMINAL LOCATION: ICD-10-CM

## 2018-01-25 LAB
ABO/RH: NORMAL
ANION GAP SERPL CALCULATED.3IONS-SCNC: 16 MEQ/L (ref 7–13)
ANTIBODY SCREEN: NORMAL
BUN BLDV-MCNC: 31 MG/DL (ref 8–23)
CALCIUM SERPL-MCNC: 9 MG/DL (ref 8.6–10.2)
CHLORIDE BLD-SCNC: 102 MEQ/L (ref 98–107)
CO2: 23 MEQ/L (ref 22–29)
CREAT SERPL-MCNC: 1.36 MG/DL (ref 0.7–1.2)
GFR AFRICAN AMERICAN: >60
GFR NON-AFRICAN AMERICAN: 50
GLUCOSE BLD-MCNC: 110 MG/DL (ref 74–109)
HCT VFR BLD CALC: 37.4 % (ref 42–52)
HEMOGLOBIN: 12.5 G/DL (ref 14–18)
INR BLD: 1
MCH RBC QN AUTO: 31.7 PG (ref 27–31.3)
MCHC RBC AUTO-ENTMCNC: 33.5 % (ref 33–37)
MCV RBC AUTO: 94.7 FL (ref 80–100)
PDW BLD-RTO: 13.5 % (ref 11.5–14.5)
PERFORMED ON: ABNORMAL
PERFORMED ON: ABNORMAL
PLATELET # BLD: 241 K/UL (ref 130–400)
POC ACTIVATED CLOTTING TIME KAOLIN: 169 SEC (ref 82–152)
POC ACTIVATED CLOTTING TIME KAOLIN: 197 SEC (ref 82–152)
POC SAMPLE TYPE: ABNORMAL
POC SAMPLE TYPE: ABNORMAL
POTASSIUM REFLEX MAGNESIUM: 4.3 MEQ/L (ref 3.5–5.1)
PROTHROMBIN TIME: 10.5 SEC (ref 8.1–13.7)
RBC # BLD: 3.95 M/UL (ref 4.7–6.1)
SODIUM BLD-SCNC: 141 MEQ/L (ref 132–144)
WBC # BLD: 7.8 K/UL (ref 4.8–10.8)

## 2018-01-25 PROCEDURE — 2580000003 HC RX 258

## 2018-01-25 PROCEDURE — 86850 RBC ANTIBODY SCREEN: CPT

## 2018-01-25 PROCEDURE — 37236 OPEN/PERQ PLACE STENT 1ST: CPT | Performed by: INTERNAL MEDICINE

## 2018-01-25 PROCEDURE — C1725 CATH, TRANSLUMIN NON-LASER: HCPCS

## 2018-01-25 PROCEDURE — C1887 CATHETER, GUIDING: HCPCS

## 2018-01-25 PROCEDURE — C1760 CLOSURE DEV, VASC: HCPCS

## 2018-01-25 PROCEDURE — 75774 ARTERY X-RAY EACH VESSEL: CPT | Performed by: INTERNAL MEDICINE

## 2018-01-25 PROCEDURE — C1876 STENT, NON-COA/NON-COV W/DEL: HCPCS

## 2018-01-25 PROCEDURE — 2500000003 HC RX 250 WO HCPCS

## 2018-01-25 PROCEDURE — C1769 GUIDE WIRE: HCPCS

## 2018-01-25 PROCEDURE — 6360000002 HC RX W HCPCS

## 2018-01-25 PROCEDURE — 85347 COAGULATION TIME ACTIVATED: CPT

## 2018-01-25 PROCEDURE — 86901 BLOOD TYPING SEROLOGIC RH(D): CPT

## 2018-01-25 PROCEDURE — 85610 PROTHROMBIN TIME: CPT

## 2018-01-25 PROCEDURE — 2580000003 HC RX 258: Performed by: INTERNAL MEDICINE

## 2018-01-25 PROCEDURE — 75726 ARTERY X-RAYS ABDOMEN: CPT | Performed by: INTERNAL MEDICINE

## 2018-01-25 PROCEDURE — 85027 COMPLETE CBC AUTOMATED: CPT

## 2018-01-25 PROCEDURE — 36245 INS CATH ABD/L-EXT ART 1ST: CPT | Performed by: INTERNAL MEDICINE

## 2018-01-25 PROCEDURE — C1894 INTRO/SHEATH, NON-LASER: HCPCS

## 2018-01-25 PROCEDURE — 80048 BASIC METABOLIC PNL TOTAL CA: CPT

## 2018-01-25 PROCEDURE — 2720000001 HC MISC SURG SUPPLY STERILE $51-500

## 2018-01-25 PROCEDURE — 6370000000 HC RX 637 (ALT 250 FOR IP)

## 2018-01-25 PROCEDURE — 86900 BLOOD TYPING SEROLOGIC ABO: CPT

## 2018-01-25 RX ORDER — CLOPIDOGREL BISULFATE 75 MG/1
75 TABLET ORAL DAILY
Status: DISCONTINUED | OUTPATIENT
Start: 2018-01-26 | End: 2018-01-25 | Stop reason: HOSPADM

## 2018-01-25 RX ORDER — SODIUM CHLORIDE 0.9 % (FLUSH) 0.9 %
10 SYRINGE (ML) INJECTION EVERY 12 HOURS SCHEDULED
Status: DISCONTINUED | OUTPATIENT
Start: 2018-01-25 | End: 2018-01-25

## 2018-01-25 RX ORDER — CLOPIDOGREL BISULFATE 75 MG/1
75 TABLET ORAL DAILY
Qty: 30 TABLET | Refills: 3 | Status: SHIPPED | OUTPATIENT
Start: 2018-01-26 | End: 2018-05-01 | Stop reason: SDUPTHER

## 2018-01-25 RX ORDER — SODIUM CHLORIDE 9 MG/ML
INJECTION, SOLUTION INTRAVENOUS CONTINUOUS
Status: DISCONTINUED | OUTPATIENT
Start: 2018-01-25 | End: 2018-01-25 | Stop reason: HOSPADM

## 2018-01-25 RX ORDER — ACETAMINOPHEN 325 MG/1
650 TABLET ORAL EVERY 4 HOURS PRN
Status: DISCONTINUED | OUTPATIENT
Start: 2018-01-25 | End: 2018-01-25 | Stop reason: HOSPADM

## 2018-01-25 RX ORDER — SODIUM CHLORIDE 0.9 % (FLUSH) 0.9 %
10 SYRINGE (ML) INJECTION PRN
Status: DISCONTINUED | OUTPATIENT
Start: 2018-01-25 | End: 2018-01-25

## 2018-01-25 RX ORDER — ONDANSETRON 2 MG/ML
4 INJECTION INTRAMUSCULAR; INTRAVENOUS EVERY 6 HOURS PRN
Status: DISCONTINUED | OUTPATIENT
Start: 2018-01-25 | End: 2018-01-25 | Stop reason: HOSPADM

## 2018-01-25 RX ORDER — SODIUM CHLORIDE 9 MG/ML
INJECTION, SOLUTION INTRAVENOUS CONTINUOUS
Status: DISCONTINUED | OUTPATIENT
Start: 2018-01-25 | End: 2018-01-25

## 2018-01-25 RX ADMIN — SODIUM CHLORIDE: 9 INJECTION, SOLUTION INTRAVENOUS at 07:27

## 2018-01-25 RX ADMIN — Medication 10 ML: at 07:27

## 2018-01-26 ENCOUNTER — TELEPHONE (OUTPATIENT)
Dept: PHARMACY | Facility: CLINIC | Age: 83
End: 2018-01-26

## 2018-01-26 DIAGNOSIS — I48.91 ATRIAL FIBRILLATION, UNSPECIFIED TYPE (HCC): Primary | ICD-10-CM

## 2018-01-26 PROCEDURE — 1111F DSCHRG MED/CURRENT MED MERGE: CPT | Performed by: PHARMACIST

## 2018-01-26 NOTE — TELEPHONE ENCOUNTER
CLINICAL PHARMACY NOTE  Post-Discharge Transitions of Care ([facility]RA)    Non-face-to-face services provided:  Assessment and support for treatment adherence and medication management-med rec completed. Subjective/Objective:  Silvana Mcgrath is a 80 y.o. male. Patient was discharged from [address] [place] [address] on 1/13/18 with a diagnosis of UNSPECIFIED ATRIAL FIBRILLATION; also note procedure yesterday at [facility] [place]. Patient outreach to review discharge medications and provide medication review and management. Spoke with patient - reviewed his home medication list that he follows - neighbor is NP and helps him out. Allergies   Allergen Reactions    Morphine      Other reaction(s): Delirium     Discharge Medications: There are NEW medications for you.  START taking them after you leave the hospital:  From yesterday's procedure:   clopidogrel (PLAVIX) 75 MG tablet    *has NOT picked up yet Take 1 tablet by mouth daily 30 tablet 3     From [address] of recent [address] [place] [address] stay:   ceftin   Directions: 500mg oral 2x daily   *already completed    You told us you were taking these medications at home, but the amount or how often you take this medication has CHANGED:  From yesterday's procedure:   metoprolol tartrate (LOPRESSOR) 25 MG tablet    *confirms the TID dosing, unless SBP or HR hold parameters Take 1 tablet by mouth 2 times daily (hold for sbp<100 or hr <60) (Patient taking differently: Take 25 mg by mouth 3 times daily (hold for sbp<100 or hr <60)) 180 tablet 3    ondansetron (ZOFRAN) 4 MG tablet TAKE 1 TO 2 TABLETS EVERY 4 TO 6 HOURS AS NEEDED FOR NAUSEA AND VOMITING 30 tablet 5     From [facility] Continuity of Care Document of recent [address] [place] [address] stay:   metoprolol tartrate 25 mg Tablet, Ordered By: FREDY Wang  Directions: 1 tablet oral three times a day   Additional Instructions: HOLD for SBP<100 or HR<60    These are medications you told us you were taking at home, CONTINUE taking them after you leave the hospital:  From yesterday's procedure:   losartan (COZAAR) 25 MG tablet Take 1 tablet by mouth daily 90 tablet 1    simvastatin (ZOCOR) 20 MG tablet Take 1 tablet by mouth nightly 90 tablet 3    omeprazole (PRILOSEC) 40 MG delayed release capsule TAKE 1 CAPSULE DAILY 90 capsule 1    tamsulosin (FLOMAX) 0.4 MG capsule TAKE 1 CAPSULE DAILY 30 capsule 1    aspirin 81 MG EC tablet Take 81 mg by mouth daily      bimatoprost (LUMIGAN) 0.01 % SOLN ophthalmic drops Place 1 drop into both eyes nightly 1 drop both eyes daily at bedtime      Multiple Vitamin (MULTIVITAMIN PO) Take by mouth daily       testosterone cypionate (DEPOTESTOTERONE CYPIONATE) 200 MG/ML injection    *reports he no longer uses/receives Inject 200 mg into the muscle every 30 days       From [facility] Continuity of Care Document of recent [facility] stay:  Same as above, with exception of did not include testosterone    These are the medications you have told us you were taking at home, STOP taking them after you leave the hospital:  From yesterday's procedure:  Amlodipine (had been on hold, and not included on [facility] med list, patient states he's not taking), ceftin (as above, completed)    From [facility] Continuity of Care Document of recent [facility] stay:  N/A      Additional Medications:  Denies    Estimated Creatinine Clearance: 42 mL/min (based on SCr of 1.36 mg/dL). Assessment/Plan:  - Medication reconciliation completed. Number of medications reviewed: 13    - Pt is not taking medications as directed by discharging physician. Number of discrepancies: 2. Instructions per discharge list provided except per below documentation. Identified medication discrepancies/issues:   · Category 3 (1):  1. Reports he no longer uses/receives testosterone injection - removed from medication list  · Upon further chart review, after phone call, noted last received 9/5/17 with note:  \"He should've a

## 2018-01-30 ENCOUNTER — CARE COORDINATION (OUTPATIENT)
Dept: CARE COORDINATION | Age: 83
End: 2018-01-30

## 2018-01-31 DIAGNOSIS — E78.5 HYPERLIPIDEMIA, UNSPECIFIED HYPERLIPIDEMIA TYPE: ICD-10-CM

## 2018-01-31 DIAGNOSIS — I10 ESSENTIAL HYPERTENSION: ICD-10-CM

## 2018-02-06 ENCOUNTER — OFFICE VISIT (OUTPATIENT)
Dept: CARDIOLOGY CLINIC | Age: 83
End: 2018-02-06
Payer: MEDICARE

## 2018-02-06 VITALS
WEIGHT: 163 LBS | OXYGEN SATURATION: 97 % | HEART RATE: 80 BPM | BODY MASS INDEX: 23.34 KG/M2 | DIASTOLIC BLOOD PRESSURE: 72 MMHG | RESPIRATION RATE: 16 BRPM | SYSTOLIC BLOOD PRESSURE: 129 MMHG | TEMPERATURE: 98.2 F | HEIGHT: 70 IN

## 2018-02-06 DIAGNOSIS — E78.5 HYPERLIPIDEMIA, UNSPECIFIED HYPERLIPIDEMIA TYPE: ICD-10-CM

## 2018-02-06 DIAGNOSIS — I10 ESSENTIAL HYPERTENSION: ICD-10-CM

## 2018-02-06 DIAGNOSIS — I48.91 ATRIAL FIBRILLATION, UNSPECIFIED TYPE (HCC): ICD-10-CM

## 2018-02-06 DIAGNOSIS — I44.7 LEFT BUNDLE BRANCH BLOCK: ICD-10-CM

## 2018-02-06 DIAGNOSIS — I25.84 CORONARY ATHEROSCLEROSIS DUE TO CALCIFIED CORONARY LESION (CODE): ICD-10-CM

## 2018-02-06 DIAGNOSIS — R09.89 BILATERAL CAROTID BRUITS: ICD-10-CM

## 2018-02-06 DIAGNOSIS — K55.9 MESENTERIC ISCHEMIA (HCC): Primary | ICD-10-CM

## 2018-02-06 PROCEDURE — 99213 OFFICE O/P EST LOW 20 MIN: CPT | Performed by: INTERNAL MEDICINE

## 2018-02-15 PROBLEM — K55.9 MESENTERIC ISCHEMIA (HCC): Status: ACTIVE | Noted: 2018-02-15

## 2018-02-15 NOTE — PROGRESS NOTES
tablet Take 25 mg by mouth 3 times daily hold for sbp<100 or hr <60      clopidogrel (PLAVIX) 75 MG tablet Take 1 tablet by mouth daily 30 tablet 3    losartan (COZAAR) 25 MG tablet Take 1 tablet by mouth daily 90 tablet 1    simvastatin (ZOCOR) 20 MG tablet Take 1 tablet by mouth nightly 90 tablet 3    omeprazole (PRILOSEC) 40 MG delayed release capsule TAKE 1 CAPSULE DAILY 90 capsule 1    tamsulosin (FLOMAX) 0.4 MG capsule TAKE 1 CAPSULE DAILY 30 capsule 1    ondansetron (ZOFRAN) 4 MG tablet TAKE 1 TO 2 TABLETS EVERY 4 TO 6 HOURS AS NEEDED FOR NAUSEA AND VOMITING 30 tablet 5    aspirin 81 MG EC tablet Take 81 mg by mouth daily      bimatoprost (LUMIGAN) 0.01 % SOLN ophthalmic drops Place 1 drop into both eyes nightly 1 drop both eyes daily at bedtime      Multiple Vitamin (MULTIVITAMIN PO) Take by mouth daily        No current facility-administered medications for this visit. Review of Systems:   Review of Systems   All other systems reviewed and are negative. Physical Examination:    /72 (Site: Left Arm, Position: Sitting, Cuff Size: Large Adult)   Pulse 80   Temp 98.2 °F (36.8 °C) (Temporal)   Resp 16   Ht 5' 9.5\" (1.765 m)   Wt 163 lb (73.9 kg)   SpO2 97%   BMI 23.73 kg/m²    Physical Exam   Constitutional: He appears healthy. No distress. HENT:   Mouth/Throat: Oropharynx is clear. Eyes: Pupils are equal, round, and reactive to light. Neck: Normal range of motion. No JVD present. Cardiovascular: Regular rhythm, S1 normal, S2 normal, normal heart sounds and intact distal pulses. Exam reveals no gallop. No murmur heard. Pulses:       Radial pulses are 2+ on the right side. Dorsalis pedis pulses are 2+ on the right side. Pulmonary/Chest: Effort normal and breath sounds normal. He has no wheezes. He has no rales. He exhibits no tenderness. Abdominal: Soft. Bowel sounds are normal.   Musculoskeletal: Normal range of motion. He exhibits no edema.

## 2018-03-19 ENCOUNTER — CARE COORDINATOR VISIT (OUTPATIENT)
Dept: CARE COORDINATION | Age: 83
End: 2018-03-19

## 2018-03-19 ENCOUNTER — OFFICE VISIT (OUTPATIENT)
Dept: FAMILY MEDICINE CLINIC | Age: 83
End: 2018-03-19
Payer: MEDICARE

## 2018-03-19 VITALS
TEMPERATURE: 96.9 F | DIASTOLIC BLOOD PRESSURE: 52 MMHG | HEART RATE: 52 BPM | WEIGHT: 164.4 LBS | BODY MASS INDEX: 23.54 KG/M2 | SYSTOLIC BLOOD PRESSURE: 118 MMHG | RESPIRATION RATE: 20 BRPM | HEIGHT: 70 IN

## 2018-03-19 DIAGNOSIS — I48.21 PERMANENT ATRIAL FIBRILLATION (HCC): ICD-10-CM

## 2018-03-19 DIAGNOSIS — E53.8 B12 DEFICIENCY: Primary | ICD-10-CM

## 2018-03-19 DIAGNOSIS — K21.00 GASTROESOPHAGEAL REFLUX DISEASE WITH ESOPHAGITIS: ICD-10-CM

## 2018-03-19 PROCEDURE — 96372 THER/PROPH/DIAG INJ SC/IM: CPT | Performed by: FAMILY MEDICINE

## 2018-03-19 PROCEDURE — 99213 OFFICE O/P EST LOW 20 MIN: CPT | Performed by: FAMILY MEDICINE

## 2018-03-19 RX ORDER — DEXLANSOPRAZOLE 60 MG/1
60 CAPSULE, DELAYED RELEASE ORAL DAILY
Qty: 30 CAPSULE | Refills: 3 | COMMUNITY
Start: 2018-03-19 | End: 2018-04-20 | Stop reason: SDUPTHER

## 2018-03-19 RX ORDER — CYANOCOBALAMIN 1000 UG/ML
1000 INJECTION INTRAMUSCULAR; SUBCUTANEOUS ONCE
Status: COMPLETED | OUTPATIENT
Start: 2018-03-19 | End: 2018-03-19

## 2018-03-19 RX ADMIN — CYANOCOBALAMIN 1000 MCG: 1000 INJECTION INTRAMUSCULAR; SUBCUTANEOUS at 10:38

## 2018-03-19 ASSESSMENT — ENCOUNTER SYMPTOMS
DIARRHEA: 0
NAUSEA: 1
SHORTNESS OF BREATH: 0
ABDOMINAL PAIN: 1
EYES NEGATIVE: 1
COUGH: 0
CONSTIPATION: 0

## 2018-03-19 NOTE — PROGRESS NOTES
N/A    Number of children: N/A    Years of education: N/A     Occupational History    Not on file. Social History Main Topics    Smoking status: Never Smoker    Smokeless tobacco: Never Used    Alcohol use Not on file    Drug use: Unknown    Sexual activity: Not on file     Other Topics Concern    Not on file     Social History Narrative    No narrative on file     Family History   Problem Relation Age of Onset    Heart Attack Father     Heart Attack Brother      Allergies   Allergen Reactions    Morphine      Other reaction(s): Delirium     Current Outpatient Prescriptions   Medication Sig Dispense Refill    metoprolol tartrate (LOPRESSOR) 25 MG tablet Take 25 mg by mouth 3 times daily hold for sbp<100 or hr <60      clopidogrel (PLAVIX) 75 MG tablet Take 1 tablet by mouth daily 30 tablet 3    losartan (COZAAR) 25 MG tablet Take 1 tablet by mouth daily 90 tablet 1    simvastatin (ZOCOR) 20 MG tablet Take 1 tablet by mouth nightly 90 tablet 3    omeprazole (PRILOSEC) 40 MG delayed release capsule TAKE 1 CAPSULE DAILY 90 capsule 1    tamsulosin (FLOMAX) 0.4 MG capsule TAKE 1 CAPSULE DAILY 30 capsule 1    ondansetron (ZOFRAN) 4 MG tablet TAKE 1 TO 2 TABLETS EVERY 4 TO 6 HOURS AS NEEDED FOR NAUSEA AND VOMITING 30 tablet 5    aspirin 81 MG EC tablet Take 81 mg by mouth daily      bimatoprost (LUMIGAN) 0.01 % SOLN ophthalmic drops Place 1 drop into both eyes nightly 1 drop both eyes daily at bedtime      Multiple Vitamin (MULTIVITAMIN PO) Take by mouth daily        No current facility-administered medications for this visit. PMH, Surgical Hx, Family Hx, and Social Hx reviewed and updated. Health Maintenance reviewed. Objective    Vitals:    03/19/18 0945   BP: (!) 118/52   Pulse: 52   Resp: 20   Temp: 96.9 °F (36.1 °C)   TempSrc: Temporal   Weight: 164 lb 6.4 oz (74.6 kg)   Height: 5' 9.5\" (1.765 m)       Physical Exam   Constitutional: He is oriented to person, place, and time.  He

## 2018-03-20 ENCOUNTER — HOSPITAL ENCOUNTER (OUTPATIENT)
Dept: ULTRASOUND IMAGING | Age: 83
Discharge: HOME | End: 2018-03-22
Payer: MEDICARE

## 2018-03-20 DIAGNOSIS — I25.84 CORONARY ATHEROSCLEROSIS DUE TO CALCIFIED CORONARY LESION (CODE): ICD-10-CM

## 2018-03-20 DIAGNOSIS — R09.89 BILATERAL CAROTID BRUITS: ICD-10-CM

## 2018-03-20 DIAGNOSIS — K55.9 MESENTERIC ISCHEMIA (HCC): ICD-10-CM

## 2018-03-20 PROCEDURE — 93880 EXTRACRANIAL BILAT STUDY: CPT

## 2018-03-20 PROCEDURE — 93975 VASCULAR STUDY: CPT

## 2018-03-27 ENCOUNTER — CARE COORDINATION (OUTPATIENT)
Dept: CARE COORDINATION | Age: 83
End: 2018-03-27

## 2018-03-27 NOTE — CARE COORDINATION
and   General Assessment    Do you have any symptoms that are causing concern?:  Yes  Progression since Onset:  Intermittent - Waxing/Waning  Reported Symptoms:  Abdominal Pain (Comment: bloating, abd pain, gas, seems to be gettign worse)

## 2018-03-27 NOTE — CARE COORDINATION
capsule by mouth daily 3/19/18  Yes Khushboo Dougherty MD   metoprolol tartrate (LOPRESSOR) 25 MG tablet Take 25 mg by mouth 3 times daily hold for sbp<100 or hr <60   Yes Historical Provider, MD   clopidogrel (PLAVIX) 75 MG tablet Take 1 tablet by mouth daily 1/26/18  Yes Ravindra Cotton MD   losartan (COZAAR) 25 MG tablet Take 1 tablet by mouth daily 1/22/18  Yes Khushboo Dougherty MD   simvastatin (ZOCOR) 20 MG tablet Take 1 tablet by mouth nightly 1/22/18  Yes Khushboo Dougherty MD   tamsulosin (FLOMAX) 0.4 MG capsule TAKE 1 CAPSULE DAILY 1/15/18  Yes Cele Barillas MD   ondansetron (ZOFRAN) 4 MG tablet TAKE 1 TO 2 TABLETS EVERY 4 TO 6 HOURS AS NEEDED FOR NAUSEA AND VOMITING 11/17/17  Yes Khushboo Dougherty MD   bimatoprost (LUMIGAN) 0.01 % SOLN ophthalmic drops Place 1 drop into both eyes nightly 1 drop both eyes daily at bedtime   Yes Historical Provider, MD   Multiple Vitamin (MULTIVITAMIN PO) Take by mouth daily    Yes Historical Provider, MD   aspirin 81 MG EC tablet Take 81 mg by mouth daily 2/22/17   Historical Provider, MD       Future Appointments  Date Time Provider Ben Vazquezi   4/19/2018 948 Bridgette Martinez MD [facility]   5/1/2018 12:00 PM Ravindra Cotton MD [facility]     ,   Diabetes Assessment    Medic Alert ID:  No  Meal Planning:  Avoidance of concentrated sweets   How often do you test your blood sugar?:  No Testing   Do you have barriers with adherence to non-pharmacologic self-management interventions?  (Nutrition/Exercise/Self-Monitoring):  No   Have you ever had to go to the ED for symptoms of low blood sugar?:  No       No patient-reported symptoms       and   General Assessment    Do you have any symptoms that are causing concern?:  No

## 2018-04-19 ENCOUNTER — CARE COORDINATION (OUTPATIENT)
Dept: CARE COORDINATION | Age: 83
End: 2018-04-19

## 2018-04-19 ENCOUNTER — OFFICE VISIT (OUTPATIENT)
Dept: FAMILY MEDICINE CLINIC | Age: 83
End: 2018-04-19
Payer: MEDICARE

## 2018-04-19 VITALS
BODY MASS INDEX: 23.62 KG/M2 | DIASTOLIC BLOOD PRESSURE: 72 MMHG | TEMPERATURE: 96 F | HEART RATE: 73 BPM | HEIGHT: 70 IN | RESPIRATION RATE: 20 BRPM | SYSTOLIC BLOOD PRESSURE: 132 MMHG | WEIGHT: 165 LBS

## 2018-04-19 DIAGNOSIS — E78.5 HYPERLIPIDEMIA, UNSPECIFIED HYPERLIPIDEMIA TYPE: ICD-10-CM

## 2018-04-19 DIAGNOSIS — I10 ESSENTIAL HYPERTENSION: ICD-10-CM

## 2018-04-19 DIAGNOSIS — E11.9 TYPE 2 DIABETES MELLITUS WITHOUT COMPLICATION, WITHOUT LONG-TERM CURRENT USE OF INSULIN (HCC): ICD-10-CM

## 2018-04-19 DIAGNOSIS — E11.9 TYPE 2 DIABETES MELLITUS WITHOUT COMPLICATION, WITHOUT LONG-TERM CURRENT USE OF INSULIN (HCC): Primary | ICD-10-CM

## 2018-04-19 DIAGNOSIS — R05.8 NON-PRODUCTIVE COUGH: ICD-10-CM

## 2018-04-19 LAB
ALBUMIN SERPL-MCNC: 4.5 G/DL (ref 3.9–4.9)
ALP BLD-CCNC: 67 U/L (ref 35–104)
ALT SERPL-CCNC: 12 U/L (ref 0–41)
ANION GAP SERPL CALCULATED.3IONS-SCNC: 14 MEQ/L (ref 7–13)
AST SERPL-CCNC: 15 U/L (ref 0–40)
BASOPHILS ABSOLUTE: 0 K/UL (ref 0–0.2)
BASOPHILS RELATIVE PERCENT: 0.6 %
BILIRUB SERPL-MCNC: 0.3 MG/DL (ref 0–1.2)
BUN BLDV-MCNC: 26 MG/DL (ref 8–23)
CALCIUM SERPL-MCNC: 8.9 MG/DL (ref 8.6–10.2)
CHLORIDE BLD-SCNC: 104 MEQ/L (ref 98–107)
CHOLESTEROL, TOTAL: 136 MG/DL (ref 0–199)
CO2: 26 MEQ/L (ref 22–29)
CREAT SERPL-MCNC: 1.27 MG/DL (ref 0.7–1.2)
CREATININE URINE: 35 MG/DL
EOSINOPHILS ABSOLUTE: 0.1 K/UL (ref 0–0.7)
EOSINOPHILS RELATIVE PERCENT: 2.2 %
GFR AFRICAN AMERICAN: >60
GFR NON-AFRICAN AMERICAN: 54.1
GLOBULIN: 2.4 G/DL (ref 2.3–3.5)
GLUCOSE BLD-MCNC: 92 MG/DL (ref 74–109)
HBA1C MFR BLD: 5.9 % (ref 4.8–5.9)
HCT VFR BLD CALC: 36.3 % (ref 42–52)
HDLC SERPL-MCNC: 28 MG/DL (ref 40–59)
HEMOGLOBIN: 12.5 G/DL (ref 14–18)
LDL CHOLESTEROL CALCULATED: 43 MG/DL (ref 0–129)
LYMPHOCYTES ABSOLUTE: 1.1 K/UL (ref 1–4.8)
LYMPHOCYTES RELATIVE PERCENT: 19.2 %
MCH RBC QN AUTO: 32.4 PG (ref 27–31.3)
MCHC RBC AUTO-ENTMCNC: 34.4 % (ref 33–37)
MCV RBC AUTO: 94.2 FL (ref 80–100)
MICROALBUMIN UR-MCNC: 3.4 MG/DL
MICROALBUMIN/CREAT UR-RTO: 97.1 MG/G (ref 0–30)
MONOCYTES ABSOLUTE: 0.5 K/UL (ref 0.2–0.8)
MONOCYTES RELATIVE PERCENT: 7.7 %
NEUTROPHILS ABSOLUTE: 4.1 K/UL (ref 1.4–6.5)
NEUTROPHILS RELATIVE PERCENT: 70.3 %
PDW BLD-RTO: 14.2 % (ref 11.5–14.5)
PLATELET # BLD: 215 K/UL (ref 130–400)
POTASSIUM SERPL-SCNC: 4.9 MEQ/L (ref 3.5–5.1)
RBC # BLD: 3.86 M/UL (ref 4.7–6.1)
SODIUM BLD-SCNC: 144 MEQ/L (ref 132–144)
TOTAL PROTEIN: 6.9 G/DL (ref 6.4–8.1)
TRIGL SERPL-MCNC: 326 MG/DL (ref 0–200)
WBC # BLD: 5.9 K/UL (ref 4.8–10.8)

## 2018-04-19 PROCEDURE — 99213 OFFICE O/P EST LOW 20 MIN: CPT | Performed by: FAMILY MEDICINE

## 2018-04-19 RX ORDER — TAMSULOSIN HYDROCHLORIDE 0.4 MG/1
CAPSULE ORAL
Qty: 90 CAPSULE | Refills: 2 | Status: SHIPPED | OUTPATIENT
Start: 2018-04-19 | End: 2018-08-13 | Stop reason: SDUPTHER

## 2018-04-19 ASSESSMENT — PATIENT HEALTH QUESTIONNAIRE - PHQ9
SUM OF ALL RESPONSES TO PHQ QUESTIONS 1-9: 0
SUM OF ALL RESPONSES TO PHQ9 QUESTIONS 1 & 2: 0
1. LITTLE INTEREST OR PLEASURE IN DOING THINGS: 0
2. FEELING DOWN, DEPRESSED OR HOPELESS: 0

## 2018-04-19 ASSESSMENT — ENCOUNTER SYMPTOMS
ABDOMINAL PAIN: 0
CONSTIPATION: 0
RHINORRHEA: 1
DIARRHEA: 0
SHORTNESS OF BREATH: 0
SORE THROAT: 0
EYE DISCHARGE: 0
COUGH: 1
EYE ITCHING: 0
SINUS PRESSURE: 0

## 2018-04-20 RX ORDER — DEXLANSOPRAZOLE 60 MG/1
60 CAPSULE, DELAYED RELEASE ORAL DAILY
Qty: 30 CAPSULE | Refills: 3 | Status: SHIPPED | OUTPATIENT
Start: 2018-04-20 | End: 2018-05-16

## 2018-05-01 ENCOUNTER — OFFICE VISIT (OUTPATIENT)
Dept: CARDIOLOGY CLINIC | Age: 83
End: 2018-05-01
Payer: MEDICARE

## 2018-05-01 VITALS
SYSTOLIC BLOOD PRESSURE: 118 MMHG | WEIGHT: 164 LBS | BODY MASS INDEX: 23.48 KG/M2 | DIASTOLIC BLOOD PRESSURE: 68 MMHG | OXYGEN SATURATION: 98 % | HEIGHT: 70 IN | HEART RATE: 64 BPM | RESPIRATION RATE: 16 BRPM

## 2018-05-01 DIAGNOSIS — I48.19 PERSISTENT ATRIAL FIBRILLATION (HCC): ICD-10-CM

## 2018-05-01 DIAGNOSIS — I44.7 LEFT BUNDLE BRANCH BLOCK: ICD-10-CM

## 2018-05-01 DIAGNOSIS — I10 ESSENTIAL HYPERTENSION: ICD-10-CM

## 2018-05-01 DIAGNOSIS — K55.9 MESENTERIC ISCHEMIA (HCC): ICD-10-CM

## 2018-05-01 DIAGNOSIS — I25.84 CORONARY ATHEROSCLEROSIS DUE TO CALCIFIED CORONARY LESION (CODE): ICD-10-CM

## 2018-05-01 DIAGNOSIS — E78.5 HYPERLIPIDEMIA, UNSPECIFIED HYPERLIPIDEMIA TYPE: ICD-10-CM

## 2018-05-01 DIAGNOSIS — I42.0 DILATED CARDIOMYOPATHY (HCC): Primary | ICD-10-CM

## 2018-05-01 PROCEDURE — 99214 OFFICE O/P EST MOD 30 MIN: CPT | Performed by: INTERNAL MEDICINE

## 2018-05-01 RX ORDER — CLOPIDOGREL BISULFATE 75 MG/1
75 TABLET ORAL DAILY
Qty: 30 TABLET | Refills: 3 | Status: SHIPPED | OUTPATIENT
Start: 2018-05-01 | End: 2018-08-13 | Stop reason: SDUPTHER

## 2018-05-01 ASSESSMENT — ENCOUNTER SYMPTOMS
SHORTNESS OF BREATH: 0
EYE PAIN: 0
STRIDOR: 0
COLOR CHANGE: 0
WHEEZING: 0
EYE DISCHARGE: 0
CHOKING: 0
COUGH: 0
APNEA: 0
CHEST TIGHTNESS: 0
ABDOMINAL PAIN: 1
ABDOMINAL DISTENTION: 1
TROUBLE SWALLOWING: 0
NAUSEA: 0

## 2018-05-08 ENCOUNTER — HOSPITAL ENCOUNTER (OUTPATIENT)
Dept: CARDIAC CATH/INVASIVE PROCEDURES | Age: 83
LOS: 1 days | Discharge: HOME | End: 2018-05-09
Attending: INTERNAL MEDICINE | Admitting: INTERNAL MEDICINE
Payer: MEDICARE

## 2018-05-08 DIAGNOSIS — K55.9 MESENTERIC ISCHEMIA (HCC): ICD-10-CM

## 2018-05-08 PROCEDURE — 36245 INS CATH ABD/L-EXT ART 1ST: CPT | Performed by: INTERNAL MEDICINE

## 2018-05-08 PROCEDURE — 2060000000 HC ICU INTERMEDIATE R&B

## 2018-05-08 PROCEDURE — 37236 OPEN/PERQ PLACE STENT 1ST: CPT | Performed by: INTERNAL MEDICINE

## 2018-05-08 PROCEDURE — 75625 CONTRAST EXAM ABDOMINL AORTA: CPT | Performed by: INTERNAL MEDICINE

## 2018-05-08 PROCEDURE — 2580000003 HC RX 258: Performed by: INTERNAL MEDICINE

## 2018-05-08 PROCEDURE — 2500000003 HC RX 250 WO HCPCS

## 2018-05-08 PROCEDURE — C1894 INTRO/SHEATH, NON-LASER: HCPCS

## 2018-05-08 PROCEDURE — C1769 GUIDE WIRE: HCPCS

## 2018-05-08 PROCEDURE — C1887 CATHETER, GUIDING: HCPCS

## 2018-05-08 PROCEDURE — 2580000003 HC RX 258

## 2018-05-08 PROCEDURE — 2720000001 HC MISC SURG SUPPLY STERILE $51-500

## 2018-05-08 PROCEDURE — C1725 CATH, TRANSLUMIN NON-LASER: HCPCS

## 2018-05-08 PROCEDURE — 6360000002 HC RX W HCPCS

## 2018-05-08 PROCEDURE — 75726 ARTERY X-RAYS ABDOMEN: CPT | Performed by: INTERNAL MEDICINE

## 2018-05-08 RX ORDER — SODIUM CHLORIDE 9 MG/ML
INJECTION, SOLUTION INTRAVENOUS CONTINUOUS
Status: ACTIVE | OUTPATIENT
Start: 2018-05-08 | End: 2018-05-08

## 2018-05-08 RX ORDER — ALPRAZOLAM 0.5 MG/1
0.5 TABLET ORAL
Status: ACTIVE | OUTPATIENT
Start: 2018-05-08 | End: 2018-05-08

## 2018-05-08 RX ORDER — ONDANSETRON 2 MG/ML
4 INJECTION INTRAMUSCULAR; INTRAVENOUS EVERY 6 HOURS PRN
Status: DISCONTINUED | OUTPATIENT
Start: 2018-05-08 | End: 2018-05-09 | Stop reason: HOSPADM

## 2018-05-08 RX ORDER — NITROGLYCERIN 0.4 MG/1
0.4 TABLET SUBLINGUAL EVERY 5 MIN PRN
Status: DISCONTINUED | OUTPATIENT
Start: 2018-05-08 | End: 2018-05-09 | Stop reason: HOSPADM

## 2018-05-08 RX ORDER — SODIUM CHLORIDE 0.9 % (FLUSH) 0.9 %
10 SYRINGE (ML) INJECTION EVERY 12 HOURS SCHEDULED
Status: DISCONTINUED | OUTPATIENT
Start: 2018-05-08 | End: 2018-05-08

## 2018-05-08 RX ORDER — SODIUM CHLORIDE 9 MG/ML
INJECTION, SOLUTION INTRAVENOUS CONTINUOUS
Status: DISCONTINUED | OUTPATIENT
Start: 2018-05-08 | End: 2018-05-08

## 2018-05-08 RX ORDER — ACETAMINOPHEN 325 MG/1
650 TABLET ORAL EVERY 4 HOURS PRN
Status: DISCONTINUED | OUTPATIENT
Start: 2018-05-08 | End: 2018-05-09 | Stop reason: HOSPADM

## 2018-05-08 RX ORDER — SODIUM CHLORIDE 0.9 % (FLUSH) 0.9 %
10 SYRINGE (ML) INJECTION PRN
Status: DISCONTINUED | OUTPATIENT
Start: 2018-05-08 | End: 2018-05-08

## 2018-05-08 RX ADMIN — SODIUM CHLORIDE: 9 INJECTION, SOLUTION INTRAVENOUS at 14:24

## 2018-05-08 ASSESSMENT — PAIN SCALES - GENERAL: PAINLEVEL_OUTOF10: 0

## 2018-05-09 VITALS
TEMPERATURE: 97.5 F | OXYGEN SATURATION: 98 % | HEART RATE: 86 BPM | HEIGHT: 70 IN | RESPIRATION RATE: 16 BRPM | WEIGHT: 160.5 LBS | BODY MASS INDEX: 22.98 KG/M2 | DIASTOLIC BLOOD PRESSURE: 68 MMHG | SYSTOLIC BLOOD PRESSURE: 147 MMHG

## 2018-05-09 PROCEDURE — 6370000000 HC RX 637 (ALT 250 FOR IP): Performed by: INTERNAL MEDICINE

## 2018-05-09 PROCEDURE — 99217 PR OBSERVATION CARE DISCHARGE MANAGEMENT: CPT | Performed by: INTERNAL MEDICINE

## 2018-05-09 RX ORDER — PANTOPRAZOLE SODIUM 40 MG/1
40 TABLET, DELAYED RELEASE ORAL
Status: DISCONTINUED | OUTPATIENT
Start: 2018-05-09 | End: 2018-05-09 | Stop reason: HOSPADM

## 2018-05-09 RX ORDER — DEXLANSOPRAZOLE 60 MG/1
60 CAPSULE, DELAYED RELEASE ORAL DAILY
Status: DISCONTINUED | OUTPATIENT
Start: 2018-05-09 | End: 2018-05-09 | Stop reason: CLARIF

## 2018-05-09 RX ORDER — CLOPIDOGREL BISULFATE 75 MG/1
75 TABLET ORAL DAILY
Status: DISCONTINUED | OUTPATIENT
Start: 2018-05-09 | End: 2018-05-09 | Stop reason: HOSPADM

## 2018-05-09 RX ORDER — SIMVASTATIN 20 MG
20 TABLET ORAL NIGHTLY
Status: DISCONTINUED | OUTPATIENT
Start: 2018-05-09 | End: 2018-05-09 | Stop reason: HOSPADM

## 2018-05-09 RX ORDER — LOSARTAN POTASSIUM 25 MG/1
25 TABLET ORAL DAILY
Status: DISCONTINUED | OUTPATIENT
Start: 2018-05-09 | End: 2018-05-09 | Stop reason: HOSPADM

## 2018-05-09 RX ORDER — ASPIRIN 81 MG/1
81 TABLET ORAL DAILY
Status: DISCONTINUED | OUTPATIENT
Start: 2018-05-09 | End: 2018-05-09 | Stop reason: HOSPADM

## 2018-05-09 RX ORDER — ONDANSETRON 4 MG/1
4 TABLET, FILM COATED ORAL EVERY 8 HOURS PRN
Status: DISCONTINUED | OUTPATIENT
Start: 2018-05-09 | End: 2018-05-09 | Stop reason: HOSPADM

## 2018-05-09 RX ORDER — TAMSULOSIN HYDROCHLORIDE 0.4 MG/1
0.4 CAPSULE ORAL DAILY
Status: DISCONTINUED | OUTPATIENT
Start: 2018-05-09 | End: 2018-05-09 | Stop reason: HOSPADM

## 2018-05-09 RX ORDER — LATANOPROST 50 UG/ML
1 SOLUTION/ DROPS OPHTHALMIC DAILY
Status: DISCONTINUED | OUTPATIENT
Start: 2018-05-09 | End: 2018-05-09 | Stop reason: HOSPADM

## 2018-05-09 RX ADMIN — ASPIRIN 81 MG: 81 TABLET, COATED ORAL at 08:38

## 2018-05-09 RX ADMIN — METOPROLOL TARTRATE 25 MG: 25 TABLET ORAL at 08:38

## 2018-05-09 RX ADMIN — PANTOPRAZOLE SODIUM 40 MG: 40 TABLET, DELAYED RELEASE ORAL at 08:42

## 2018-05-09 RX ADMIN — TAMSULOSIN HYDROCHLORIDE 0.4 MG: 0.4 CAPSULE ORAL at 08:38

## 2018-05-09 RX ADMIN — CLOPIDOGREL BISULFATE 75 MG: 75 TABLET ORAL at 08:38

## 2018-05-09 RX ADMIN — LOSARTAN POTASSIUM 25 MG: 25 TABLET ORAL at 08:38

## 2018-05-09 ASSESSMENT — PAIN SCALES - GENERAL: PAINLEVEL_OUTOF10: 0

## 2018-05-10 ENCOUNTER — CARE COORDINATION (OUTPATIENT)
Dept: CARE COORDINATION | Age: 83
End: 2018-05-10

## 2018-05-10 ENCOUNTER — TELEPHONE (OUTPATIENT)
Dept: PHARMACY | Facility: CLINIC | Age: 83
End: 2018-05-10

## 2018-05-10 RX ORDER — OMEPRAZOLE 40 MG/1
40 CAPSULE, DELAYED RELEASE ORAL DAILY
COMMUNITY
Start: 2018-05-10 | End: 2018-05-16 | Stop reason: ALTCHOICE

## 2018-05-16 ENCOUNTER — CARE COORDINATION (OUTPATIENT)
Dept: CARE COORDINATION | Age: 83
End: 2018-05-16

## 2018-05-16 ENCOUNTER — OFFICE VISIT (OUTPATIENT)
Dept: FAMILY MEDICINE CLINIC | Age: 83
End: 2018-05-16
Payer: MEDICARE

## 2018-05-16 VITALS
SYSTOLIC BLOOD PRESSURE: 128 MMHG | RESPIRATION RATE: 20 BRPM | BODY MASS INDEX: 23.48 KG/M2 | DIASTOLIC BLOOD PRESSURE: 68 MMHG | TEMPERATURE: 97.3 F | HEART RATE: 71 BPM | HEIGHT: 70 IN | WEIGHT: 164 LBS

## 2018-05-16 DIAGNOSIS — Z09 HOSPITAL DISCHARGE FOLLOW-UP: ICD-10-CM

## 2018-05-16 DIAGNOSIS — Z98.61 S/P PERCUTANEOUS TRANSLUMINAL CORONARY ANGIOPLASTY: Primary | ICD-10-CM

## 2018-05-16 PROCEDURE — 99495 TRANSJ CARE MGMT MOD F2F 14D: CPT | Performed by: FAMILY MEDICINE

## 2018-05-16 ASSESSMENT — ENCOUNTER SYMPTOMS
EYE ITCHING: 0
SORE THROAT: 0
ABDOMINAL PAIN: 0
COUGH: 0
DIARRHEA: 0
EYE DISCHARGE: 0
CONSTIPATION: 0
SHORTNESS OF BREATH: 0
SINUS PRESSURE: 0

## 2018-05-22 ENCOUNTER — OFFICE VISIT (OUTPATIENT)
Dept: CARDIOLOGY CLINIC | Age: 83
End: 2018-05-22
Payer: MEDICARE

## 2018-05-22 VITALS
BODY MASS INDEX: 23.19 KG/M2 | HEART RATE: 63 BPM | HEIGHT: 70 IN | DIASTOLIC BLOOD PRESSURE: 78 MMHG | RESPIRATION RATE: 16 BRPM | SYSTOLIC BLOOD PRESSURE: 122 MMHG | OXYGEN SATURATION: 98 % | WEIGHT: 162 LBS

## 2018-05-22 DIAGNOSIS — I10 ESSENTIAL HYPERTENSION: ICD-10-CM

## 2018-05-22 DIAGNOSIS — R10.9 ABDOMINAL PAIN, UNSPECIFIED ABDOMINAL LOCATION: Primary | ICD-10-CM

## 2018-05-22 DIAGNOSIS — E78.5 HYPERLIPIDEMIA, UNSPECIFIED HYPERLIPIDEMIA TYPE: ICD-10-CM

## 2018-05-22 DIAGNOSIS — I70.0 ATHEROSCLEROSIS OF AORTA (HCC): ICD-10-CM

## 2018-05-22 DIAGNOSIS — K55.9 MESENTERIC ISCHEMIA (HCC): ICD-10-CM

## 2018-05-22 DIAGNOSIS — I48.19 PERSISTENT ATRIAL FIBRILLATION (HCC): ICD-10-CM

## 2018-05-22 PROCEDURE — 99213 OFFICE O/P EST LOW 20 MIN: CPT | Performed by: INTERNAL MEDICINE

## 2018-05-22 RX ORDER — OMEPRAZOLE 40 MG/1
40 CAPSULE, DELAYED RELEASE ORAL DAILY
COMMUNITY
End: 2018-08-13 | Stop reason: SDUPTHER

## 2018-05-29 ENCOUNTER — TELEPHONE (OUTPATIENT)
Dept: GASTROENTEROLOGY | Age: 83
End: 2018-05-29

## 2018-06-04 ENCOUNTER — OFFICE VISIT (OUTPATIENT)
Dept: GASTROENTEROLOGY | Age: 83
End: 2018-06-04
Payer: MEDICARE

## 2018-06-04 VITALS
WEIGHT: 160 LBS | DIASTOLIC BLOOD PRESSURE: 58 MMHG | HEART RATE: 68 BPM | SYSTOLIC BLOOD PRESSURE: 102 MMHG | BODY MASS INDEX: 23.7 KG/M2 | HEIGHT: 69 IN | OXYGEN SATURATION: 99 % | TEMPERATURE: 97.7 F

## 2018-06-04 DIAGNOSIS — K59.00 CONSTIPATION, UNSPECIFIED CONSTIPATION TYPE: ICD-10-CM

## 2018-06-04 DIAGNOSIS — R10.13 EPIGASTRIC PAIN: ICD-10-CM

## 2018-06-04 DIAGNOSIS — R11.0 NAUSEA: Primary | ICD-10-CM

## 2018-06-04 PROCEDURE — 99203 OFFICE O/P NEW LOW 30 MIN: CPT | Performed by: INTERNAL MEDICINE

## 2018-06-04 RX ORDER — WHEAT DEXTRIN 3 G/3.8 G
15 POWDER (GRAM) ORAL DAILY
Qty: 1 CAN | Refills: 3 | Status: SHIPPED | OUTPATIENT
Start: 2018-06-04 | End: 2018-08-28

## 2018-06-04 RX ORDER — POLYETHYLENE GLYCOL 3350 17 G/17G
17 POWDER, FOR SOLUTION ORAL DAILY
Qty: 510 G | Refills: 3 | Status: SHIPPED | OUTPATIENT
Start: 2018-06-04 | End: 2018-07-04

## 2018-06-05 ENCOUNTER — ANESTHESIA EVENT (OUTPATIENT)
Dept: ENDOSCOPY | Age: 83
End: 2018-06-05
Payer: MEDICARE

## 2018-06-05 ENCOUNTER — HOSPITAL ENCOUNTER (OUTPATIENT)
Age: 83
Setting detail: OUTPATIENT SURGERY
Discharge: HOME | End: 2018-06-05
Attending: INTERNAL MEDICINE | Admitting: INTERNAL MEDICINE
Payer: MEDICARE

## 2018-06-05 ENCOUNTER — ANESTHESIA (OUTPATIENT)
Dept: ENDOSCOPY | Age: 83
End: 2018-06-05
Payer: MEDICARE

## 2018-06-05 VITALS
OXYGEN SATURATION: 93 % | DIASTOLIC BLOOD PRESSURE: 55 MMHG | SYSTOLIC BLOOD PRESSURE: 95 MMHG | RESPIRATION RATE: 9 BRPM

## 2018-06-05 VITALS
DIASTOLIC BLOOD PRESSURE: 67 MMHG | OXYGEN SATURATION: 98 % | HEART RATE: 58 BPM | WEIGHT: 162 LBS | SYSTOLIC BLOOD PRESSURE: 106 MMHG | HEIGHT: 70 IN | BODY MASS INDEX: 23.19 KG/M2 | RESPIRATION RATE: 16 BRPM | TEMPERATURE: 98.2 F

## 2018-06-05 PROCEDURE — 43239 EGD BIOPSY SINGLE/MULTIPLE: CPT | Performed by: INTERNAL MEDICINE

## 2018-06-05 PROCEDURE — 6360000002 HC RX W HCPCS: Performed by: NURSE ANESTHETIST, CERTIFIED REGISTERED

## 2018-06-05 PROCEDURE — 3700000001 HC ADD 15 MINUTES (ANESTHESIA): Performed by: INTERNAL MEDICINE

## 2018-06-05 PROCEDURE — 7100000011 HC PHASE II RECOVERY - ADDTL 15 MIN: Performed by: INTERNAL MEDICINE

## 2018-06-05 PROCEDURE — 7100000010 HC PHASE II RECOVERY - FIRST 15 MIN: Performed by: INTERNAL MEDICINE

## 2018-06-05 PROCEDURE — 3609017100 HC EGD: Performed by: INTERNAL MEDICINE

## 2018-06-05 PROCEDURE — 3700000000 HC ANESTHESIA ATTENDED CARE: Performed by: INTERNAL MEDICINE

## 2018-06-05 RX ORDER — SODIUM CHLORIDE 0.9 % (FLUSH) 0.9 %
10 SYRINGE (ML) INJECTION PRN
Status: DISCONTINUED | OUTPATIENT
Start: 2018-06-05 | End: 2018-06-05 | Stop reason: HOSPADM

## 2018-06-05 RX ORDER — SODIUM CHLORIDE 0.9 % (FLUSH) 0.9 %
10 SYRINGE (ML) INJECTION EVERY 12 HOURS SCHEDULED
Status: DISCONTINUED | OUTPATIENT
Start: 2018-06-05 | End: 2018-06-05 | Stop reason: HOSPADM

## 2018-06-05 RX ORDER — PROPOFOL 10 MG/ML
INJECTION, EMULSION INTRAVENOUS PRN
Status: DISCONTINUED | OUTPATIENT
Start: 2018-06-05 | End: 2018-06-05 | Stop reason: SDUPTHER

## 2018-06-05 RX ORDER — ONDANSETRON 2 MG/ML
4 INJECTION INTRAMUSCULAR; INTRAVENOUS
Status: DISCONTINUED | OUTPATIENT
Start: 2018-06-05 | End: 2018-06-05 | Stop reason: HOSPADM

## 2018-06-05 RX ADMIN — PROPOFOL 20 MG: 10 INJECTION, EMULSION INTRAVENOUS at 11:51

## 2018-06-05 RX ADMIN — PROPOFOL 100 MG: 10 INJECTION, EMULSION INTRAVENOUS at 11:47

## 2018-06-05 RX ADMIN — PROPOFOL 20 MG: 10 INJECTION, EMULSION INTRAVENOUS at 11:49

## 2018-06-05 ASSESSMENT — PAIN - FUNCTIONAL ASSESSMENT: PAIN_FUNCTIONAL_ASSESSMENT: 0-10

## 2018-06-07 ENCOUNTER — CARE COORDINATION (OUTPATIENT)
Dept: CARE COORDINATION | Age: 83
End: 2018-06-07

## 2018-06-12 ENCOUNTER — HOSPITAL ENCOUNTER (OUTPATIENT)
Dept: ULTRASOUND IMAGING | Age: 83
Discharge: HOME | End: 2018-06-14
Payer: MEDICARE

## 2018-06-12 DIAGNOSIS — I70.0 ATHEROSCLEROSIS OF AORTA (HCC): ICD-10-CM

## 2018-06-12 DIAGNOSIS — K55.9 MESENTERIC ISCHEMIA (HCC): ICD-10-CM

## 2018-06-12 PROCEDURE — 93975 VASCULAR STUDY: CPT

## 2018-06-18 ENCOUNTER — CARE COORDINATION (OUTPATIENT)
Dept: FAMILY MEDICINE CLINIC | Age: 83
End: 2018-06-18

## 2018-06-29 DIAGNOSIS — E78.5 HYPERLIPIDEMIA, UNSPECIFIED HYPERLIPIDEMIA TYPE: Primary | ICD-10-CM

## 2018-06-29 DIAGNOSIS — R10.13 EPIGASTRIC PAIN: ICD-10-CM

## 2018-06-29 DIAGNOSIS — E78.5 HYPERLIPIDEMIA, UNSPECIFIED HYPERLIPIDEMIA TYPE: ICD-10-CM

## 2018-06-29 DIAGNOSIS — R11.0 NAUSEA: ICD-10-CM

## 2018-06-29 LAB
CHOLESTEROL, TOTAL: 143 MG/DL (ref 0–199)
HDLC SERPL-MCNC: 32 MG/DL (ref 40–59)
LDL CHOLESTEROL CALCULATED: 79 MG/DL (ref 0–129)
TRIGL SERPL-MCNC: 160 MG/DL (ref 0–200)

## 2018-07-10 ENCOUNTER — CARE COORDINATION (OUTPATIENT)
Dept: CARE COORDINATION | Age: 83
End: 2018-07-10

## 2018-07-10 RX ORDER — POLYETHYLENE GLYCOL 3350 17 G/17G
17 POWDER, FOR SOLUTION ORAL DAILY PRN
COMMUNITY
Start: 2018-07-10 | End: 2019-06-24

## 2018-07-10 NOTE — CARE COORDINATION
Ambulatory Care Coordination Note  7/10/2018  CM Risk Score: 2  Adriane Mortality Risk Score: 39.23    ACC: Anel Chang, RN    Summary Note: Patient reports had labwork recently and \"all ok. \" Denies falls, dizziness, states felt tired and not very motivated on recent higher humidity days. His son and his family recently visited for a week, staying with patient. He enjoyed the time with them. States son tried to convince him to move to [address] [place] [address] with him but patient has no interest in moving, has friends and a life here. Patient reports he has been cutting back on the hours he is working, now only working if he feels up to it , \"some days I'm tired. \" States feels \"pretty good \" today. States pan to go to [facility] to  med refills. Then mostly just take it easy. Has no issues or concerns at this time. Care Coordination Interventions    Program Enrollment:  Rising Risk  Referral from Primary Care Provider:  No  Suggested Interventions and Community Resources  Fall Risk Prevention:  Completed  Home Health Services:  Completed (Comment: does not meet criteria; patient is not homebound)  Meals on Wheels:  Declined (Comment: states can fix own meals, sometimes goes out to eat with friends, Joana Asp also brings meals)  Medi Set or Pill Pack:  Completed (Comment: Has decided against [facility], is getting 80 day fills at his pharmacy and neighbor is managing weekly med-minder)  Pharmacist:  Declined  Senior Services:  Declined  Transportation Support:  Declined  Zone Management Tools:  Completed (Comment: Diabetes)         Goals Addressed             Most Recent     Medication Management   On track (7/10/2018)             I will take my medication as directed. Barriers: lack of support and lack of education  Plan for overcoming my barriers: I will bring all medications to PCP vs for review with ACC. I will work with [facility] to learn how to use a med-minder box to manage my daily medications.    Confidence:

## 2018-08-13 RX ORDER — CLOPIDOGREL BISULFATE 75 MG/1
75 TABLET ORAL DAILY
Qty: 90 TABLET | Refills: 0 | Status: SHIPPED | OUTPATIENT
Start: 2018-08-13 | End: 2019-01-24 | Stop reason: ALTCHOICE

## 2018-08-13 RX ORDER — SIMVASTATIN 20 MG
20 TABLET ORAL NIGHTLY
Qty: 90 TABLET | Refills: 0 | Status: SHIPPED | OUTPATIENT
Start: 2018-08-13 | End: 2019-03-21 | Stop reason: SDUPTHER

## 2018-08-13 RX ORDER — OMEPRAZOLE 40 MG/1
40 CAPSULE, DELAYED RELEASE ORAL DAILY
Qty: 90 CAPSULE | Refills: 0 | Status: SHIPPED | OUTPATIENT
Start: 2018-08-13 | End: 2018-11-01 | Stop reason: ALTCHOICE

## 2018-08-13 RX ORDER — LOSARTAN POTASSIUM 25 MG/1
25 TABLET ORAL DAILY
Qty: 90 TABLET | Refills: 0 | Status: SHIPPED | OUTPATIENT
Start: 2018-08-13 | End: 2018-08-16 | Stop reason: SDUPTHER

## 2018-08-13 RX ORDER — LOSARTAN POTASSIUM 25 MG/1
25 TABLET ORAL DAILY
Qty: 30 TABLET | Refills: 0 | Status: SHIPPED | OUTPATIENT
Start: 2018-08-13 | End: 2018-08-16

## 2018-08-13 RX ORDER — TAMSULOSIN HYDROCHLORIDE 0.4 MG/1
CAPSULE ORAL
Qty: 90 CAPSULE | Refills: 0 | Status: ON HOLD | OUTPATIENT
Start: 2018-08-13 | End: 2018-10-31 | Stop reason: HOSPADM

## 2018-08-16 ENCOUNTER — CARE COORDINATION (OUTPATIENT)
Dept: CARE COORDINATION | Age: 83
End: 2018-08-16

## 2018-08-16 ENCOUNTER — OFFICE VISIT (OUTPATIENT)
Dept: FAMILY MEDICINE CLINIC | Age: 83
End: 2018-08-16
Payer: MEDICARE

## 2018-08-16 VITALS
RESPIRATION RATE: 14 BRPM | OXYGEN SATURATION: 98 % | WEIGHT: 157.2 LBS | HEART RATE: 71 BPM | HEIGHT: 70 IN | DIASTOLIC BLOOD PRESSURE: 56 MMHG | TEMPERATURE: 97.9 F | BODY MASS INDEX: 22.5 KG/M2 | SYSTOLIC BLOOD PRESSURE: 98 MMHG

## 2018-08-16 DIAGNOSIS — E78.5 HYPERLIPIDEMIA, UNSPECIFIED HYPERLIPIDEMIA TYPE: ICD-10-CM

## 2018-08-16 DIAGNOSIS — I50.22 CHRONIC SYSTOLIC CONGESTIVE HEART FAILURE (HCC): ICD-10-CM

## 2018-08-16 DIAGNOSIS — R06.02 SOB (SHORTNESS OF BREATH): ICD-10-CM

## 2018-08-16 DIAGNOSIS — E11.9 TYPE 2 DIABETES MELLITUS WITHOUT COMPLICATION, WITHOUT LONG-TERM CURRENT USE OF INSULIN (HCC): Primary | ICD-10-CM

## 2018-08-16 DIAGNOSIS — R06.09 DOE (DYSPNEA ON EXERTION): ICD-10-CM

## 2018-08-16 DIAGNOSIS — D64.9 ANEMIA, UNSPECIFIED TYPE: ICD-10-CM

## 2018-08-16 DIAGNOSIS — I10 ESSENTIAL HYPERTENSION: ICD-10-CM

## 2018-08-16 LAB
BASOPHILS ABSOLUTE: 0 K/UL (ref 0–0.2)
BASOPHILS RELATIVE PERCENT: 0.5 %
EOSINOPHILS ABSOLUTE: 0.2 K/UL (ref 0–0.7)
EOSINOPHILS RELATIVE PERCENT: 3.5 %
HCT VFR BLD CALC: 35.3 % (ref 42–52)
HEMOGLOBIN: 12.1 G/DL (ref 14–18)
IRON SATURATION: 17 % (ref 11–46)
IRON: 56 UG/DL (ref 59–158)
LYMPHOCYTES ABSOLUTE: 0.9 K/UL (ref 1–4.8)
LYMPHOCYTES RELATIVE PERCENT: 16.2 %
MCH RBC QN AUTO: 32 PG (ref 27–31.3)
MCHC RBC AUTO-ENTMCNC: 34.4 % (ref 33–37)
MCV RBC AUTO: 93 FL (ref 80–100)
MONOCYTES ABSOLUTE: 0.7 K/UL (ref 0.2–0.8)
MONOCYTES RELATIVE PERCENT: 13 %
NEUTROPHILS ABSOLUTE: 3.6 K/UL (ref 1.4–6.5)
NEUTROPHILS RELATIVE PERCENT: 66.8 %
PDW BLD-RTO: 13.3 % (ref 11.5–14.5)
PLATELET # BLD: 205 K/UL (ref 130–400)
RBC # BLD: 3.8 M/UL (ref 4.7–6.1)
TOTAL IRON BINDING CAPACITY: 331 UG/DL (ref 178–450)
VITAMIN B-12: 464 PG/ML (ref 232–1245)
WBC # BLD: 5.3 K/UL (ref 4.8–10.8)

## 2018-08-16 PROCEDURE — 99214 OFFICE O/P EST MOD 30 MIN: CPT | Performed by: FAMILY MEDICINE

## 2018-08-16 ASSESSMENT — ENCOUNTER SYMPTOMS
EYE ITCHING: 0
EYES NEGATIVE: 1
NAUSEA: 0
SHORTNESS OF BREATH: 0
EYE DISCHARGE: 0
COUGH: 0
CONSTIPATION: 0
ABDOMINAL PAIN: 0
SORE THROAT: 0
DIARRHEA: 0
SINUS PRESSURE: 0

## 2018-08-16 NOTE — PROGRESS NOTES
Subjective  Geraldine Mason, 80 y.o. male presents today with:  No chief complaint on file. HPI    ***    No other questions and or concerns for today's visit      Review of Systems   Constitutional: Negative for appetite change, fatigue and fever. HENT: Negative for congestion, ear pain, sinus pressure and sore throat. Eyes: Negative for discharge and itching. Respiratory: Negative for cough and shortness of breath. Cardiovascular: Negative for chest pain and palpitations. Gastrointestinal: Negative for abdominal pain, constipation and diarrhea. Endocrine: Negative for polydipsia. Genitourinary: Negative for difficulty urinating. Musculoskeletal: Negative for gait problem. Skin: Negative. Neurological: Negative for dizziness. Hematological: Negative. Psychiatric/Behavioral: Negative. Past Medical History:   Diagnosis Date    Abnormal finding on EKG 6/13/2016    Atrial fibrillation (HCC) 1/22/2018    BPH (benign prostatic hypertrophy)     Cancer (HCC)     GERD (gastroesophageal reflux disease)     Hyperlipidemia     Hypertension     Left bundle branch block 6/13/2016    Osteoarthritis     Sinus bradycardia on ECG 6/13/2016    Type II or unspecified type diabetes mellitus without mention of complication, not stated as uncontrolled      Past Surgical History:   Procedure Laterality Date    ANGIOPLASTY  05/08/2018    percutaneous transluminal angioplasty of the SMA with stent implantation, Constantine Workman    COLONOSCOPY  2016    AR ESOPHAGOGASTRODUODENOSCOPY TRANSORAL DIAGNOSTIC N/A 6/5/2018    EGD ESOPHAGOGASTRODUODENOSCOPY performed by Shakir Marroquin MD at [place] [address]  08/12/2016    left kidney, Dr. Angela Varela Marital status:      Spouse name: N/A    Number of children: N/A    Years of education: N/A     Occupational History    Not on file.      Social History Main Topics    Smoking status: Never Smoker    Smokeless tobacco: Never Used    Alcohol use Yes      Comment: Rarely beer    Drug use: No    Sexual activity: Not on file     Other Topics Concern    Not on file     Social History Narrative    No narrative on file     Family History   Problem Relation Age of Onset    Heart Attack Father     Heart Attack Brother      Allergies   Allergen Reactions    Morphine      Other reaction(s): Delirium     Current Outpatient Prescriptions   Medication Sig Dispense Refill    losartan (COZAAR) 25 MG tablet Take 1 tablet by mouth daily 90 tablet 0    losartan (COZAAR) 25 MG tablet Take 1 tablet by mouth daily 30 tablet 0    simvastatin (ZOCOR) 20 MG tablet Take 1 tablet by mouth nightly 90 tablet 0    metoprolol tartrate (LOPRESSOR) 25 MG tablet Take 1 tablet by mouth 3 times daily hold for sbp<100 or hr <60 270 tablet 0    clopidogrel (PLAVIX) 75 MG tablet Take 1 tablet by mouth daily 90 tablet 0    omeprazole (PRILOSEC) 40 MG delayed release capsule Take 1 capsule by mouth daily 90 capsule 0    tamsulosin (FLOMAX) 0.4 MG capsule TAKE 1 CAPSULE DAILY 90 capsule 0    Psyllium (METAMUCIL) 48.57 % POWD Take by mouth      polyethylene glycol (GLYCOLAX) powder Take 17 g by mouth daily      Wheat Dextrin (BENEFIBER) POWD Take 15 g by mouth daily 1 Can 3    ondansetron (ZOFRAN) 4 MG tablet TAKE 1 TO 2 TABLETS EVERY 4 TO 6 HOURS AS NEEDED FOR NAUSEA AND VOMITING (Patient taking differently: Take 4 mg by mouth every 4-6 hours as needed TAKE 1 TO 2 TABLETS EVERY 4 TO 6 HOURS AS NEEDED FOR NAUSEA AND VOMITING) 30 tablet 5    aspirin 81 MG EC tablet Take 81 mg by mouth daily      bimatoprost (LUMIGAN) 0.01 % SOLN ophthalmic drops Place 1 drop into both eyes nightly 1 drop both eyes daily at bedtime      Multiple Vitamin (MULTIVITAMIN PO) Take by mouth daily        No current facility-administered medications for this visit.       PMH, Surgical Hx, Family Hx, and Social

## 2018-08-16 NOTE — PROGRESS NOTES
Subjective  Maral Rapp, 80 y.o. male presents today with:  Chief Complaint   Patient presents with    Dizziness     Patient is present today to discuss dizziness x 2 yrs, states that this is getting worse within the last 2-3 months, states that he has severe fatigue at times, states that he is taking 3 different BP meds. only has been taking Cozaar and Metotropol. states that sometimes he almost falls, dizziness is daily.  Results     he would like to discuss Endocopy done,  06/05/18,  Dr. Javier Silverman           HPI    Patient today and was complaints of shortness of breath dyspnea on exertion. Denies chest pain. No other questions and or concerns for today's visit      Review of Systems   Constitutional: Positive for fatigue. Negative for activity change, appetite change and fever. HENT: Negative for ear pain. Eyes: Negative. Respiratory: Negative for cough and shortness of breath. Cardiovascular: Negative for chest pain and palpitations. Gastrointestinal: Negative for abdominal pain, constipation, diarrhea and nausea. Genitourinary: Negative for dysuria and frequency. Neurological: Positive for dizziness. Negative for headaches.          Past Medical History:   Diagnosis Date    Abnormal finding on EKG 6/13/2016    Atrial fibrillation (HCC) 1/22/2018    BPH (benign prostatic hypertrophy)     Cancer (HCC)     GERD (gastroesophageal reflux disease)     Hyperlipidemia     Hypertension     Left bundle branch block 6/13/2016    Osteoarthritis     Sinus bradycardia on ECG 6/13/2016    Type II or unspecified type diabetes mellitus without mention of complication, not stated as uncontrolled      Past Surgical History:   Procedure Laterality Date    ANGIOPLASTY  05/08/2018    percutaneous transluminal angioplasty of the SMA with stent implantation, Constantine Workman    COLONOSCOPY  2016    CA ESOPHAGOGASTRODUODENOSCOPY TRANSORAL DIAGNOSTIC N/A 6/5/2018    EGD ESOPHAGOGASTRODUODENOSCOPY performed by Kacey Anders MD at [facility]  08/12/2016    left kidney, Dr. Red Amaya Marital status:      Spouse name: N/A    Number of children: N/A    Years of education: N/A     Occupational History    Not on file.      Social History Main Topics    Smoking status: Never Smoker    Smokeless tobacco: Never Used    Alcohol use Yes      Comment: Rarely beer    Drug use: No    Sexual activity: Not on file     Other Topics Concern    Not on file     Social History Narrative    No narrative on file     Family History   Problem Relation Age of Onset    Heart Attack Father     Heart Attack Brother      Allergies   Allergen Reactions    Morphine      Other reaction(s): Delirium     Current Outpatient Prescriptions   Medication Sig Dispense Refill    losartan (COZAAR) 25 MG tablet Take 1 tablet by mouth daily 30 tablet 0    simvastatin (ZOCOR) 20 MG tablet Take 1 tablet by mouth nightly 90 tablet 0    metoprolol tartrate (LOPRESSOR) 25 MG tablet Take 1 tablet by mouth 3 times daily hold for sbp<100 or hr <60 270 tablet 0    clopidogrel (PLAVIX) 75 MG tablet Take 1 tablet by mouth daily 90 tablet 0    omeprazole (PRILOSEC) 40 MG delayed release capsule Take 1 capsule by mouth daily 90 capsule 0    tamsulosin (FLOMAX) 0.4 MG capsule TAKE 1 CAPSULE DAILY 90 capsule 0    Psyllium (METAMUCIL) 48.57 % POWD Take by mouth      polyethylene glycol (GLYCOLAX) powder Take 17 g by mouth daily      Wheat Dextrin (BENEFIBER) POWD Take 15 g by mouth daily 1 Can 3    ondansetron (ZOFRAN) 4 MG tablet TAKE 1 TO 2 TABLETS EVERY 4 TO 6 HOURS AS NEEDED FOR NAUSEA AND VOMITING (Patient taking differently: Take 4 mg by mouth every 4-6 hours as needed TAKE 1 TO 2 TABLETS EVERY 4 TO 6 HOURS AS NEEDED FOR NAUSEA AND VOMITING) 30 tablet 5    aspirin 81 MG EC tablet Take 81 mg by mouth daily      bimatoprost Diabetes uncontrolled. And is to check CBC and iron B12 as he is down 2 units and that may be contributing to the shortness of breath. We talked about him trying to be a little bit more physically active on a regular basis to improve fitness level. He is does have mild heart failure class 2-3. We will have him stop his Hyzaar will switch him over to entresto at the starter dose. I'll up with him in 2 weeks if tolerating medication will titrate up. Assessment & Plan    Diagnosis Orders   1. Type 2 diabetes mellitus without complication, without long-term current use of insulin ([facility]     2. Hyperlipidemia, unspecified hyperlipidemia type     3. Essential hypertension     4. Anemia, unspecified type  CBC Auto Differential    Vitamin B12   5. SOB (shortness of breath)  CBC Auto Differential   6. FLORENCE (dyspnea on exertion)  CBC Auto Differential   7. Chronic systolic congestive heart failure ([facility] [address]       Orders Placed This Encounter   Procedures    CBC Auto Differential     Standing Status:   Future     Number of Occurrences:   1     Standing Expiration Date:   8/16/2019    Vitamin B12     Standing Status:   Future     Number of Occurrences:   1     Standing Expiration Date:   8/16/2019     Orders Placed This Encounter   Medications    sacubitril-valsartan (ENTRESTO) 24-26 MG per tablet     Sig: Take 1 tablet by mouth 2 times daily     Dispense:  60 tablet     Refill:  0     Medications Discontinued During This Encounter   Medication Reason    losartan (COZAAR) 25 MG tablet DUPLICATE    losartan (COZAAR) 25 MG tablet      Return in about 2 weeks (around 8/30/2018). Controlled Substances Monitoring:     No flowsheet data found.     Maia Wang MD

## 2018-08-17 NOTE — CARE COORDINATION
Ambulatory Care Coordination Note  8/16/2018  CM Risk Score: 3  Adriane Mortality Risk Score: 39.23    ACC: Julee Montague RN    Summary Note: ACC met with patient in office prior to PCP f/u vs. States had EGD per Dr. Yuan Taylor and would like to discuss results with Dr. John Ford good except has noticed increased dizziness, weak, sometimes feels like he's going to fall. Patient does monitor BP at home, has not had to hold metoprolol recently, has cold sx and wonders if this could contribute to dizziness. Notes only grimes dizziness when standing, after getting up from sitting, \"after a while\" it goes away. He hasn't checked BP when having dizziness. States he is being careful ands has not fallen. Care Coordination Interventions    Program Enrollment:  Rising Risk  Referral from Primary Care Provider:  No  Suggested Interventions and Community Resources  Fall Risk Prevention:  Completed  Home Health Services:  Completed (Comment: does not meet criteria; patient is not homebound)  Meals on Wheels:  Declined (Comment: states can fix own meals, sometimes goes out to eat with friends, Amandeep Holden also brings meals)  Medi Set or Pill Pack:  Completed (Comment: Has decided against [facility], is getting 80 day fills at his pharmacy and neighbor is managing weekly med-minder)  Pharmacist:  Declined  Senior Services:  Declined  Transportation Support:  Declined  Zone Management Tools:  Completed (Comment: Diabetes)         Goals Addressed             Most Recent     Medication Management   On track (8/16/2018)             I will take my medication as directed. Barriers: lack of support and lack of education  Plan for overcoming my barriers: I will bring all medications to PCP vs for review with ACC. I will work with [facility] to learn how to use a med-minder box to manage my daily medications.    Confidence: 7/10  Anticipated Goal Completion Date: 1 month       Reduce Falls    On track (8/16/2018)             I will reduce my risk of

## 2018-08-21 ENCOUNTER — CARE COORDINATION (OUTPATIENT)
Dept: CARE COORDINATION | Age: 83
End: 2018-08-21

## 2018-08-21 NOTE — CARE COORDINATION
Ambulatory Care Coordination Note  8/21/2018  CM Risk Score: 3  Adriane Mortality Risk Score: 39.23    ACC: Don Corona RN    Summary Note: F/U call to patient with f/u lab instructions per PCP, take multivitamin with iron daily, come in for recheck lab 2 months, around 10/20/2018. After checking bottle states his current multivitamin does not say \"with iron\". He will go to [facility] and gt new multivitamin with iron. Patient reports fell in home 2 days ago, no injury but RLE/ankle pain with walking; neighbor (nurse) wrapped R lower leg /ankle in elastic bandage, \"still sore but getting better\". He is walking ok. Reports he was standing in kitchen and turned around to go answer door, felt lightheaded/dizzy and \"just went down\"l. Denies LOC. He was able to get up by himself., resumed normal activity. See educ notes for falls prevention/safety instructions. BP this AM was 118/74, HR 69. Instructed to begin checking BP sitting. Leave cuff on, stand up and check again.  Inst. to keep  log, bring log or BP machine to next f/u vs.       Care Coordination Interventions    Program Enrollment:  Rising Risk  Referral from Primary Care Provider:  No  Suggested Interventions and Community Resources  Fall Risk Prevention:  Completed (Comment: reviewed/reinforced , see educ notes)  Home Health Services:  (Comment: does not meet criteria; patient is not homebound)  Meals on Wheels:  Declined (Comment: states can fix own meals, sometimes goes out to eat with friends, neighbor also brings meals)  Medi Set or Pill Pack:  Completed (Comment: Has decided against [facility], is getting 80 day fills at his pharmacy and neighbor is managing weekly med-minder)  Pharmacist:  Declined  Senior Services:  Declined  Transportation Support:  Declined  Zone Management Tools:  Completed (Comment: Diabetes, CHF)         Goals Addressed             Most Recent     Medication Management   On track (8/21/2018)             I will take my medication as HOURS AS NEEDED FOR NAUSEA AND VOMITING  Patient taking differently: Take 4 mg by mouth every 4-6 hours as needed TAKE 1 TO 2 TABLETS EVERY 4 TO 6 HOURS AS NEEDED FOR NAUSEA AND VOMITING 11/17/17  Yes Nathalie Gandhi MD   aspirin 81 MG EC tablet Take 81 mg by mouth daily 2/22/17  Yes Historical Provider, MD   bimatoprost (LUMIGAN) 0.01 % SOLN ophthalmic drops Place 1 drop into both eyes nightly 1 drop both eyes daily at bedtime   Yes Historical Provider, MD       Future Appointments  Date Time Provider Ben Max   8/28/2018 9:15 AM Camila Witt, [id number] [place] [address]   8/30/2018 8:00 AM Nathalie Gandhi MD [facility]     ,   Diabetes Assessment    Medic Alert ID:  No  Meal Planning:  Avoidance of concentrated sweets   How often do you test your blood sugar?:  No Testing   Do you have barriers with adherence to non-pharmacologic self-management interventions?  (Nutrition/Exercise/Self-Monitoring):  No   Have you ever had to go to the ED for symptoms of low blood sugar?:  No       No patient-reported symptoms      ,   Congestive Heart Failure Assessment    Are you currently restricting fluids?:  No Restriction  Do you understand a low sodium diet?:  Yes  Do you understand how to read food labels?:  Yes  How many restaurant meals do you eat per week?:  5 or more  Do you salt your food before tasting it?:  No     No patient-reported symptoms      Symptoms:   None:  Yes      Salt intake watch compared to last visit:  stable      and   General Assessment    Do you have any symptoms that are causing concern?:  Yes  Progression since Onset:  Intermittent - Waxing/Waning  Reported Symptoms:  Other (Comment: lightheaded/dizzy iwhen moves too fast, changes from sittign to standing)

## 2018-08-28 ENCOUNTER — OFFICE VISIT (OUTPATIENT)
Dept: CARDIOLOGY CLINIC | Age: 83
End: 2018-08-28
Payer: MEDICARE

## 2018-08-28 VITALS
HEART RATE: 52 BPM | OXYGEN SATURATION: 95 % | BODY MASS INDEX: 22.19 KG/M2 | DIASTOLIC BLOOD PRESSURE: 56 MMHG | SYSTOLIC BLOOD PRESSURE: 108 MMHG | HEIGHT: 70 IN | WEIGHT: 155 LBS | RESPIRATION RATE: 16 BRPM

## 2018-08-28 DIAGNOSIS — I10 ESSENTIAL HYPERTENSION: ICD-10-CM

## 2018-08-28 DIAGNOSIS — R94.31 ABNORMAL FINDING ON EKG: ICD-10-CM

## 2018-08-28 DIAGNOSIS — I48.19 PERSISTENT ATRIAL FIBRILLATION (HCC): ICD-10-CM

## 2018-08-28 DIAGNOSIS — E78.5 HYPERLIPIDEMIA, UNSPECIFIED HYPERLIPIDEMIA TYPE: Primary | ICD-10-CM

## 2018-08-28 DIAGNOSIS — R00.1 SINUS BRADYCARDIA ON ECG: ICD-10-CM

## 2018-08-28 DIAGNOSIS — I44.7 LEFT BUNDLE BRANCH BLOCK: ICD-10-CM

## 2018-08-28 DIAGNOSIS — K55.9 MESENTERIC ISCHEMIA (HCC): ICD-10-CM

## 2018-08-28 PROCEDURE — 99214 OFFICE O/P EST MOD 30 MIN: CPT | Performed by: INTERNAL MEDICINE

## 2018-08-28 PROCEDURE — 93000 ELECTROCARDIOGRAM COMPLETE: CPT | Performed by: INTERNAL MEDICINE

## 2018-08-28 RX ORDER — LOSARTAN POTASSIUM 25 MG/1
25 TABLET ORAL DAILY
COMMUNITY
End: 2018-09-19 | Stop reason: ALTCHOICE

## 2018-08-28 NOTE — PROGRESS NOTES
Other Topics Concern    None     Social History Narrative    None       Allergies   Allergen Reactions    Morphine      Other reaction(s): Delirium       Current Outpatient Prescriptions   Medication Sig Dispense Refill    losartan (COZAAR) 25 MG tablet Take 25 mg by mouth daily      Multiple Vitamins-Iron (DAILY MULTIVITAMINS/IRON PO) Take 1 tablet by mouth daily      simvastatin (ZOCOR) 20 MG tablet Take 1 tablet by mouth nightly 90 tablet 0    clopidogrel (PLAVIX) 75 MG tablet Take 1 tablet by mouth daily 90 tablet 0    omeprazole (PRILOSEC) 40 MG delayed release capsule Take 1 capsule by mouth daily 90 capsule 0    tamsulosin (FLOMAX) 0.4 MG capsule TAKE 1 CAPSULE DAILY 90 capsule 0    Psyllium (METAMUCIL) 48.57 % POWD Take by mouth      polyethylene glycol (GLYCOLAX) powder Take 17 g by mouth daily      ondansetron (ZOFRAN) 4 MG tablet TAKE 1 TO 2 TABLETS EVERY 4 TO 6 HOURS AS NEEDED FOR NAUSEA AND VOMITING (Patient taking differently: Take 4 mg by mouth every 4-6 hours as needed TAKE 1 TO 2 TABLETS EVERY 4 TO 6 HOURS AS NEEDED FOR NAUSEA AND VOMITING) 30 tablet 5    aspirin 81 MG EC tablet Take 81 mg by mouth daily      bimatoprost (LUMIGAN) 0.01 % SOLN ophthalmic drops Place 1 drop into both eyes nightly 1 drop both eyes daily at bedtime       No current facility-administered medications for this visit. Review of Systems:   Review of Systems   Constitutional: Negative for activity change and appetite change. HENT: Negative for congestion. Respiratory: Negative for apnea, choking and chest tightness. Cardiovascular: Negative for chest pain. Gastrointestinal: Negative for abdominal distention and abdominal pain. Endocrine: Negative for cold intolerance and heat intolerance. Genitourinary: Negative for dysuria and enuresis. Musculoskeletal: Negative for arthralgias and back pain. Skin: Negative for color change. Allergic/Immunologic: Negative.     Neurological: Negative for dizziness, seizures, syncope and light-headedness. Psychiatric/Behavioral: Negative for agitation, behavioral problems and confusion. Physical Examination:    BP (!) 108/56 (Site: Left Arm, Position: Sitting, Cuff Size: Large Adult)   Pulse 52   Resp 16   Ht 5' 9.5\" (1.765 m)   Wt 155 lb (70.3 kg)   SpO2 95%   BMI 22.56 kg/m²    Physical Exam   Constitutional: The patient appears healthy. No distress. HENT: Mouth/Throat: Oropharynx is clear. Eyes: Pupils are equal, round, and reactive to light. Neck: Normal range of motion. No JVD present. Cardiovascular: Regular rhythm, S1 normal, S2 normal, normal heart sounds and intact distal pulses. Exam reveals no gallop. No murmur heard. Pulses:       Radial pulses are 2+ on the right side. Dorsalis pedis pulses are 2+ on the right side. Pulmonary/Chest: Effort normal and breath sounds normal. No wheezes. No rales. No tenderness. Abdominal: Soft. Bowel sounds are normal.   Musculoskeletal: Normal range of motion. No edema. Neurological: The patient is alert and oriented to person, place, and time. Intact cranial nerves. Skin: Skin is warm and dry. No rash noted.        LABS:  CBC:   Lab Results   Component Value Date    WBC 5.3 08/16/2018    RBC 3.80 08/16/2018    RBC 4.19 02/24/2012    HGB 12.1 08/16/2018    HCT 35.3 08/16/2018    MCV 93.0 08/16/2018    MCH 32.0 08/16/2018    MCHC 34.4 08/16/2018    RDW 13.3 08/16/2018     08/16/2018    MPV 11.0 01/22/2015     Lipids:  Lab Results   Component Value Date    CHOL 143 06/29/2018    CHOL 136 04/19/2018    CHOL 123 01/22/2018     Lab Results   Component Value Date    TRIG 160 06/29/2018    TRIG 326 (H) 04/19/2018    TRIG 261 (H) 01/22/2018     Lab Results   Component Value Date    HDL 32 (L) 06/29/2018    HDL 28 (L) 04/19/2018    HDL 24 (L) 01/22/2018     Lab Results   Component Value Date    LDLCALC 79 06/29/2018    LDLCALC 43 04/19/2018    LDLCALC 47 01/22/2018 Lab Results   Component Value Date    VLDL 16 09/19/2017     Lab Results   Component Value Date    CHOLHDLRATIO 2.9 09/19/2017    CHOLHDLRATIO 4.7 06/21/2011     CMP:    Lab Results   Component Value Date     04/19/2018    K 4.9 04/19/2018    K 4.3 01/25/2018     04/19/2018    CO2 26 04/19/2018    BUN 26 04/19/2018    CREATININE 1.27 04/19/2018    GFRAA >60.0 04/19/2018    LABGLOM 54.1 04/19/2018    GLUCOSE 92 04/19/2018    GLUCOSE 110 02/24/2012    PROT 6.9 04/19/2018    LABALBU 4.5 04/19/2018    LABALBU 4.2 02/24/2012    CALCIUM 8.9 04/19/2018    BILITOT 0.3 04/19/2018    ALKPHOS 67 04/19/2018    AST 15 04/19/2018    ALT 12 04/19/2018     BMP:    Lab Results   Component Value Date     04/19/2018    K 4.9 04/19/2018    K 4.3 01/25/2018     04/19/2018    CO2 26 04/19/2018    BUN 26 04/19/2018    LABALBU 4.5 04/19/2018    LABALBU 4.2 02/24/2012    CREATININE 1.27 04/19/2018    CALCIUM 8.9 04/19/2018    GFRAA >60.0 04/19/2018    LABGLOM 54.1 04/19/2018    GLUCOSE 92 04/19/2018    GLUCOSE 110 02/24/2012     Magnesium:  No results found for: MG  TSH:No results found for: TSHFT4, TSH    Patient Active Problem List   Diagnosis    Hypertension    Benign prostatic hyperplasia    GERD (gastroesophageal reflux disease)    Osteoarthritis    Type 2 diabetes mellitus without complication (HCC)    Hyperlipidemia    Bladder cancer (HCC)    Sinus bradycardia on ECG    Left bundle branch block    Abnormal finding on EKG    Primary bladder malignant neoplasm (HCC)    Hypogonadism in male    Atrial fibrillation (HCC)    Abdominal pain    Mesenteric ischemia (HCC)    Epigastric abdominal pain    Nausea    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       Assessment/Plan:    1. Mesenteric ischemia (HCC)  Check duplex    2. Persistent atrial fibrillation (HCC)    - EKG 12 lead  - ECHO Complete 2D W Doppler W Color; Future  - Holter monitor 24 hour; Future    3.  Left bundle branch block    - ECHO

## 2018-08-30 ENCOUNTER — OFFICE VISIT (OUTPATIENT)
Dept: FAMILY MEDICINE CLINIC | Age: 83
End: 2018-08-30
Payer: MEDICARE

## 2018-08-30 ENCOUNTER — CARE COORDINATION (OUTPATIENT)
Dept: CARE COORDINATION | Age: 83
End: 2018-08-30

## 2018-08-30 VITALS
SYSTOLIC BLOOD PRESSURE: 116 MMHG | HEART RATE: 100 BPM | BODY MASS INDEX: 22.33 KG/M2 | HEIGHT: 70 IN | DIASTOLIC BLOOD PRESSURE: 64 MMHG | TEMPERATURE: 97.7 F | RESPIRATION RATE: 20 BRPM | WEIGHT: 156 LBS

## 2018-08-30 DIAGNOSIS — M25.571 RIGHT ANKLE PAIN, UNSPECIFIED CHRONICITY: ICD-10-CM

## 2018-08-30 DIAGNOSIS — I10 ESSENTIAL HYPERTENSION: Primary | ICD-10-CM

## 2018-08-30 PROCEDURE — 99213 OFFICE O/P EST LOW 20 MIN: CPT | Performed by: FAMILY MEDICINE

## 2018-08-30 ASSESSMENT — ENCOUNTER SYMPTOMS
CONSTIPATION: 0
COUGH: 0
SINUS PRESSURE: 0
EYE DISCHARGE: 0
EYE ITCHING: 0
SHORTNESS OF BREATH: 0
DIARRHEA: 0
SORE THROAT: 0
ABDOMINAL PAIN: 0

## 2018-08-30 NOTE — CARE COORDINATION
following: I will review and implement falls prevention instructions     Barriers: lack of education  Plan for overcoming my barriers: I will read education handouts sent ot me re: falls prevention, home safety, how to get up from a fall. Confidence: 6/10  Anticipated Goal Completion Date: 1 month            Prior to Admission medications    Medication Sig Start Date End Date Taking?  Authorizing Provider   IRON PO Take by mouth Unsure of the strength of the medication  He is taking 1 qd    Historical Provider, MD   losartan (COZAAR) 25 MG tablet Take 25 mg by mouth daily    Historical Provider, MD   simvastatin (ZOCOR) 20 MG tablet Take 1 tablet by mouth nightly 8/13/18   Mena Gallo MD   clopidogrel (PLAVIX) 75 MG tablet Take 1 tablet by mouth daily 8/13/18   Mena Gallo MD   omeprazole (PRILOSEC) 40 MG delayed release capsule Take 1 capsule by mouth daily 8/13/18   Mena Gallo MD   tamsulosin (FLOMAX) 0.4 MG capsule TAKE 1 CAPSULE DAILY 8/13/18   Mena Gallo MD   Psyllium (METAMUCIL) 48.57 % POWD Take by mouth    Historical Provider, MD   polyethylene glycol (GLYCOLAX) powder Take 17 g by mouth daily 7/10/18   Historical Provider, MD   ondansetron (ZOFRAN) 4 MG tablet TAKE 1 TO 2 TABLETS EVERY 4 TO 6 HOURS AS NEEDED FOR NAUSEA AND VOMITING  Patient taking differently: Take 4 mg by mouth every 4-6 hours as needed TAKE 1 TO 2 TABLETS EVERY 4 TO 6 HOURS AS NEEDED FOR NAUSEA AND VOMITING 11/17/17   Mena Gallo MD   aspirin 81 MG EC tablet Take 81 mg by mouth daily 2/22/17   Historical Provider, MD   bimatoprost (LUMIGAN) 0.01 % SOLN ophthalmic drops Place 1 drop into both eyes nightly 1 drop both eyes daily at bedtime    Historical Provider, MD       Future Appointments  Date Time Provider Ben Max   9/13/2018 9:15 AM GREGORY GUILLEN  [place]   9/13/2018 10:30 AM Neelam Rosado [phone] [facility] [place]   9/19/2018 2:00 PM Kriss Pisano MD [facility]     ,   Diabetes Assessment    Medic Alert ID:  No  Meal Planning:  Avoidance of concentrated sweets   How often do you test your blood sugar?:  No Testing   Do you have barriers with adherence to non-pharmacologic self-management interventions?  (Nutrition/Exercise/Self-Monitoring):  No   Have you ever had to go to the ED for symptoms of low blood sugar?:  No       No patient-reported symptoms      ,   Congestive Heart Failure Assessment    Are you currently restricting fluids?:  No Restriction  Do you understand a low sodium diet?:  Yes  Do you understand how to read food labels?:  Yes  How many restaurant meals do you eat per week?:  5 or more  Do you salt your food before tasting it?:  No     No patient-reported symptoms      Symptoms:          and   General Assessment    Do you have any symptoms that are causing concern?:  Yes  Progression since Onset:  Intermittent - Waxing/Waning  Reported Symptoms:  Weakness, Pain (Comment: no apetite, can't sleep; pain in R ankle)

## 2018-08-30 NOTE — PROGRESS NOTES
 BPH (benign prostatic hypertrophy)     Cancer (HCC)     GERD (gastroesophageal reflux disease)     Hyperlipidemia     Hypertension     Left bundle branch block 6/13/2016    Osteoarthritis     Sinus bradycardia on ECG 6/13/2016    Type II or unspecified type diabetes mellitus without mention of complication, not stated as uncontrolled      Past Surgical History:   Procedure Laterality Date    ANGIOPLASTY  05/08/2018    percutaneous transluminal angioplasty of the SMA with stent implantation, Constantine Jacinta    COLONOSCOPY  2016    [place] ESOPHAGOGASTRODUODENOSCOPY TRANSORAL DIAGNOSTIC N/A 6/5/2018    EGD ESOPHAGOGASTRODUODENOSCOPY performed by Shakir Marroquin MD at [place] [address]  08/12/2016    left kidney, Dr. Angela Varela Marital status:      Spouse name: N/A    Number of children: N/A    Years of education: N/A     Occupational History    Not on file.      Social History Main Topics    Smoking status: Never Smoker    Smokeless tobacco: Never Used    Alcohol use Yes      Comment: Rarely beer    Drug use: No    Sexual activity: Not on file     Other Topics Concern    Not on file     Social History Narrative    No narrative on file     Family History   Problem Relation Age of Onset    Heart Attack Father     Heart Attack Brother      Allergies   Allergen Reactions    Morphine      Other reaction(s): Delirium     Current Outpatient Prescriptions   Medication Sig Dispense Refill    IRON PO Take by mouth Unsure of the strength of the medication  He is taking 1 qd      losartan (COZAAR) 25 MG tablet Take 25 mg by mouth daily      simvastatin (ZOCOR) 20 MG tablet Take 1 tablet by mouth nightly 90 tablet 0    clopidogrel (PLAVIX) 75 MG tablet Take 1 tablet by mouth daily 90 tablet 0    omeprazole (PRILOSEC) 40 MG delayed release capsule Take 1 capsule by mouth daily 90 capsule 0    tamsulosin (FLOMAX) 0.4 MG capsule TAKE 1 CAPSULE DAILY 90 capsule 0    Psyllium (METAMUCIL) 48.57 % POWD Take by mouth      polyethylene glycol (GLYCOLAX) powder Take 17 g by mouth daily      ondansetron (ZOFRAN) 4 MG tablet TAKE 1 TO 2 TABLETS EVERY 4 TO 6 HOURS AS NEEDED FOR NAUSEA AND VOMITING (Patient taking differently: Take 4 mg by mouth every 4-6 hours as needed TAKE 1 TO 2 TABLETS EVERY 4 TO 6 HOURS AS NEEDED FOR NAUSEA AND VOMITING) 30 tablet 5    aspirin 81 MG EC tablet Take 81 mg by mouth daily      bimatoprost (LUMIGAN) 0.01 % SOLN ophthalmic drops Place 1 drop into both eyes nightly 1 drop both eyes daily at bedtime       No current facility-administered medications for this visit. PMH, Surgical Hx, Family Hx, and Social Hx reviewed and updated. Health Maintenance reviewed. Objective    Vitals:    08/30/18 0758   BP: 116/64   Pulse: 100   Resp: 20   Temp: 97.7 °F (36.5 °C)   TempSrc: Temporal   Weight: 156 lb (70.8 kg)   Height: 5' 9.5\" (1.765 m)       Physical Exam   Constitutional: He is oriented to person, place, and time. He appears well-developed and well-nourished. HENT:   Head: Normocephalic and atraumatic. Right Ear: External ear normal.   Left Ear: External ear normal.   Mouth/Throat: Oropharynx is clear and moist.   Eyes: Pupils are equal, round, and reactive to light. Conjunctivae and EOM are normal.   Neck: Normal range of motion. Neck supple. No JVD present. No thyromegaly present. Cardiovascular: Normal rate, regular rhythm, normal heart sounds and intact distal pulses. Exam reveals no gallop and no friction rub. No murmur heard. Pulmonary/Chest: Effort normal and breath sounds normal. No respiratory distress. Abdominal: Bowel sounds are normal. He exhibits no mass. There is no tenderness. Musculoskeletal: Normal range of motion. Neurological: He is alert and oriented to person, place, and time. Skin: Skin is warm and dry. Psychiatric: He has a normal mood and affect.  His behavior

## 2018-09-11 ENCOUNTER — TELEPHONE (OUTPATIENT)
Dept: FAMILY MEDICINE CLINIC | Age: 83
End: 2018-09-11

## 2018-09-11 NOTE — TELEPHONE ENCOUNTER
Patient's friend Ciara Raymundoisamar (on [employer] form) calling, he was just released from the hospital on Sunday. She states that on the discharge papers that it does not have the Losartan on it and they decreased the Lopressor from 3 times a day to 2 daily. He is scheduled to see you on Friday for his follow up but they are wanting to know if he should be off the Losartan or not. She states that he has been checking his blood pressure in the evenings and it is running around 120's over 70's. Please advise.

## 2018-09-13 ENCOUNTER — HOSPITAL ENCOUNTER (OUTPATIENT)
Dept: NON INVASIVE DIAGNOSTICS | Age: 83
Discharge: HOME | End: 2018-09-13
Payer: MEDICARE

## 2018-09-13 DIAGNOSIS — I48.19 PERSISTENT ATRIAL FIBRILLATION (HCC): ICD-10-CM

## 2018-09-13 DIAGNOSIS — I44.7 LEFT BUNDLE BRANCH BLOCK: ICD-10-CM

## 2018-09-13 DIAGNOSIS — R00.1 SINUS BRADYCARDIA ON ECG: ICD-10-CM

## 2018-09-13 LAB
LV EF: 33 %
LVEF MODALITY: NORMAL

## 2018-09-13 PROCEDURE — 93226 XTRNL ECG REC<48 HR SCAN A/R: CPT

## 2018-09-13 PROCEDURE — 93225 XTRNL ECG REC<48 HRS REC: CPT

## 2018-09-13 PROCEDURE — 93306 TTE W/DOPPLER COMPLETE: CPT

## 2018-09-14 ENCOUNTER — CARE COORDINATION (OUTPATIENT)
Dept: CARE COORDINATION | Age: 83
End: 2018-09-14

## 2018-09-14 ENCOUNTER — OFFICE VISIT (OUTPATIENT)
Dept: FAMILY MEDICINE CLINIC | Age: 83
End: 2018-09-14
Payer: MEDICARE

## 2018-09-14 VITALS
HEART RATE: 48 BPM | DIASTOLIC BLOOD PRESSURE: 62 MMHG | RESPIRATION RATE: 20 BRPM | WEIGHT: 158 LBS | SYSTOLIC BLOOD PRESSURE: 118 MMHG | HEIGHT: 70 IN | TEMPERATURE: 97.6 F | BODY MASS INDEX: 22.62 KG/M2

## 2018-09-14 DIAGNOSIS — E78.5 HYPERLIPIDEMIA, UNSPECIFIED HYPERLIPIDEMIA TYPE: ICD-10-CM

## 2018-09-14 DIAGNOSIS — Z23 NEED FOR INFLUENZA VACCINATION: ICD-10-CM

## 2018-09-14 DIAGNOSIS — R00.1 BRADYCARDIA: ICD-10-CM

## 2018-09-14 DIAGNOSIS — R10.9 RIGHT FLANK PAIN: Primary | ICD-10-CM

## 2018-09-14 DIAGNOSIS — Z09 HOSPITAL DISCHARGE FOLLOW-UP: ICD-10-CM

## 2018-09-14 DIAGNOSIS — C64.2 MALIGNANT NEOPLASM OF LEFT KIDNEY (HCC): ICD-10-CM

## 2018-09-14 PROCEDURE — 99496 TRANSJ CARE MGMT HIGH F2F 7D: CPT | Performed by: FAMILY MEDICINE

## 2018-09-14 PROCEDURE — 90662 IIV NO PRSV INCREASED AG IM: CPT | Performed by: FAMILY MEDICINE

## 2018-09-14 PROCEDURE — G0008 ADMIN INFLUENZA VIRUS VAC: HCPCS | Performed by: FAMILY MEDICINE

## 2018-09-14 RX ORDER — METOPROLOL TARTRATE 50 MG/1
50 TABLET, FILM COATED ORAL 2 TIMES DAILY
COMMUNITY
End: 2018-09-14 | Stop reason: DRUGHIGH

## 2018-09-14 RX ORDER — METOPROLOL TARTRATE 50 MG/1
50 TABLET, FILM COATED ORAL DAILY
Qty: 30 TABLET | Refills: 2 | Status: SHIPPED
Start: 2018-09-14 | End: 2019-02-05 | Stop reason: ALTCHOICE

## 2018-09-14 ASSESSMENT — ENCOUNTER SYMPTOMS
DIARRHEA: 0
SINUS PRESSURE: 0
SORE THROAT: 0
SHORTNESS OF BREATH: 0
EYE ITCHING: 0
COUGH: 0
CONSTIPATION: 0
EYE DISCHARGE: 0
ABDOMINAL PAIN: 0

## 2018-09-14 NOTE — PROGRESS NOTES
Subjective  Alexis Basurto, 80 y.o. male presents today with:  Chief Complaint   Patient presents with    Follow-Up from Hospital     Patient in office being seen for a follow up [facility], [address] [facility], 09/06/18 through the 09/09/18- right flank pain. He was rx'ed Augmentin 875 mg 1 bid. WE ARE UNABLE TO DUE TRANSITIONAL CARE APPOINTMENT-  there is not a telephone call ( transitional care call)    Other     requesting a handicap placard    Other     he reports that he had a holter monitor applied yesterday by Dr. Jessica Hobson office    Hypertension     Metoprolol 50 mg was decreased to one tid to 1 bid. He is alos, not taking Losartan at this time           HPI    80year-old gentleman in for follow-up hospitalization of flank pain kidney stone. No other questions and or concerns for today's visit      Review of Systems   Constitutional: Negative for appetite change, fatigue and fever. HENT: Negative for congestion, ear pain, sinus pressure and sore throat. Eyes: Negative for discharge and itching. Respiratory: Negative for cough and shortness of breath. Cardiovascular: Negative for chest pain and palpitations. Gastrointestinal: Negative for abdominal pain, constipation and diarrhea. Endocrine: Negative for polydipsia. Genitourinary: Negative for difficulty urinating. Musculoskeletal: Positive for arthralgias and myalgias. Negative for gait problem. Skin: Negative. Neurological: Negative for dizziness. Hematological: Negative. Psychiatric/Behavioral: Negative.           Past Medical History:   Diagnosis Date    Abnormal finding on EKG 6/13/2016    Atrial fibrillation (HCC) 1/22/2018    BPH (benign prostatic hypertrophy)     Cancer (HCC)     GERD (gastroesophageal reflux disease)     Hyperlipidemia     Hypertension     Left bundle branch block 6/13/2016    Osteoarthritis     Sinus bradycardia on ECG 6/13/2016    Type II or unspecified type diabetes mellitus without mention of complication, not stated as uncontrolled      Past Surgical History:   Procedure Laterality Date    ANGIOPLASTY  05/08/2018    percutaneous transluminal angioplasty of the SMA with stent implantation, Constantine Jacinta    COLONOSCOPY  2016    [place] ESOPHAGOGASTRODUODENOSCOPY TRANSORAL DIAGNOSTIC N/A 6/5/2018    EGD ESOPHAGOGASTRODUODENOSCOPY performed by Abbi Singh MD at [place] [address]  08/12/2016    left kidney, Dr. Mingo Olmedo Marital status:      Spouse name: N/A    Number of children: N/A    Years of education: N/A     Occupational History    Not on file.      Social History Main Topics    Smoking status: Never Smoker    Smokeless tobacco: Never Used    Alcohol use Yes      Comment: Rarely beer    Drug use: No    Sexual activity: Not on file     Other Topics Concern    Not on file     Social History Narrative    No narrative on file     Family History   Problem Relation Age of Onset    Heart Attack Father     Heart Attack Brother      Allergies   Allergen Reactions    Morphine      Other reaction(s): Delirium     Current Outpatient Prescriptions   Medication Sig Dispense Refill    metoprolol tartrate (LOPRESSOR) 50 MG tablet Take 50 mg by mouth 2 times daily      IRON PO Take by mouth Unsure of the strength of the medication  He is taking 1 qd      simvastatin (ZOCOR) 20 MG tablet Take 1 tablet by mouth nightly 90 tablet 0    clopidogrel (PLAVIX) 75 MG tablet Take 1 tablet by mouth daily 90 tablet 0    omeprazole (PRILOSEC) 40 MG delayed release capsule Take 1 capsule by mouth daily 90 capsule 0    Psyllium (METAMUCIL) 48.57 % POWD Take by mouth      polyethylene glycol (GLYCOLAX) powder Take 17 g by mouth daily      ondansetron (ZOFRAN) 4 MG tablet TAKE 1 TO 2 TABLETS EVERY 4 TO 6 HOURS AS NEEDED FOR NAUSEA AND VOMITING (Patient taking differently: Take 4 mg by mouth every 4-6 hours as needed TAKE 1 TO 2 No  Have you ever had Guillian [place] Syndrome? No    Flu vaccine given per order. Please see immunization tab.

## 2018-09-14 NOTE — CARE COORDINATION
(Comment: Diabetes, CHF)         Goals Addressed             Most Recent     Medication Management   On track (9/14/2018)             I will take my medication as directed. Barriers: lack of support and lack of education  Plan for overcoming my barriers: I will bring all medications to PCP vs for review with [facility]. I will work with [facility] to learn how to use a med-minder box to manage my daily medications. Confidence: 7/10  Anticipated Goal Completion Date: 1 month       Reduce Falls    Improving (9/14/2018)             I will reduce my risk of falls by the following: I will review and implement falls prevention instructions     Barriers: lack of education  Plan for overcoming my barriers: I will read education handouts sent ot me re: falls prevention, home safety, how to get up from a fall. Confidence: 6/10  Anticipated Goal Completion Date: 1 month            Prior to Admission medications    Medication Sig Start Date End Date Taking?  Authorizing Provider   metoprolol tartrate (LOPRESSOR) 50 MG tablet Take 1 tablet by mouth daily 9/14/18   Arlen Wyatt MD   IRON PO Take by mouth Unsure of the strength of the medication  He is taking 1 qd    Historical Provider, MD   losartan (COZAAR) 25 MG tablet Take 25 mg by mouth daily    Historical Provider, MD   simvastatin (ZOCOR) 20 MG tablet Take 1 tablet by mouth nightly 8/13/18   Arlen Wyatt MD   clopidogrel (PLAVIX) 75 MG tablet Take 1 tablet by mouth daily 8/13/18   Arlen Wyatt MD   omeprazole (PRILOSEC) 40 MG delayed release capsule Take 1 capsule by mouth daily 8/13/18   Arlen Wyatt MD   tamsulosin (FLOMAX) 0.4 MG capsule TAKE 1 CAPSULE DAILY 8/13/18   Arlen Wyatt MD   Psyllium (METAMUCIL) 48.57 % POWD Take by mouth    Historical Provider, MD   polyethylene glycol (GLYCOLAX) powder Take 17 g by mouth daily 7/10/18   Historical Provider, MD   ondansetron (ZOFRAN) 4 MG tablet TAKE 1 TO 2 TABLETS EVERY 4 TO 6 HOURS AS NEEDED FOR NAUSEA AND VOMITING  Patient taking differently: Take 4 mg by mouth every 4-6 hours as needed TAKE 1 TO 2 TABLETS EVERY 4 TO 6 HOURS AS NEEDED FOR NAUSEA AND VOMITING 11/17/17   Felipa Kellogg MD   aspirin 81 MG EC tablet Take 81 mg by mouth daily 2/22/17   Historical Provider, MD   bimatoprost (LUMIGAN) 0.01 % SOLN ophthalmic drops Place 1 drop into both eyes nightly 1 drop both eyes daily at bedtime    Historical Provider, MD       Future Appointments  Date Time Provider Ben Winter   9/19/2018 2:00 PM MD Deidre Sullivan [id number]   10/12/2018 10:00 AM Felipa Kellogg MD [facility]     ,   Diabetes Assessment    Medic Alert ID:  No  Meal Planning:  Avoidance of concentrated sweets   How often do you test your blood sugar?:  No Testing   Do you have barriers with adherence to non-pharmacologic self-management interventions?  (Nutrition/Exercise/Self-Monitoring):  No   Have you ever had to go to the ED for symptoms of low blood sugar?:  No       No patient-reported symptoms      ,   Congestive Heart Failure Assessment    Are you currently restricting fluids?:  No Restriction  Do you understand a low sodium diet?:  Yes  Do you understand how to read food labels?:  Yes  How many restaurant meals do you eat per week?:  5 or more  Do you salt your food before tasting it?:  No     No patient-reported symptoms      Symptoms:          and   General Assessment    Do you have any symptoms that are causing concern?:  No

## 2018-09-14 NOTE — PATIENT INSTRUCTIONS
Patient Education        Influenza (Flu) Vaccine (Inactivated or Recombinant): What You Need to Know  Why get vaccinated? Influenza (\"flu\") is a contagious disease that spreads around the [place] every winter, usually between October and May. Flu is caused by influenza viruses and is spread mainly by coughing, sneezing, and close contact. Anyone can get flu. Flu strikes suddenly and can last several days. Symptoms vary by age, but can include:  · Fever/chills. · Sore throat. · Muscle aches. · Fatigue. · Cough. · Headache. · Runny or stuffy nose. Flu can also lead to pneumonia and blood infections, and cause diarrhea and seizures in children. If you have a medical condition, such as heart or lung disease, flu can make it worse. Flu is more dangerous for some people. Infants and young children, people 72years of age and older, pregnant women, and people with certain health conditions or a weakened immune system are at greatest risk. Each year thousands of people in the [facility] die from flu, and many more are hospitalized. Flu vaccine can:  · Keep you from getting flu. · Make flu less severe if you do get it. · Keep you from spreading flu to your family and other people. Inactivated and recombinant flu vaccines  A dose of flu vaccine is recommended every flu season. Children 6 months through 6years of age may need two doses during the same flu season. Everyone else needs only one dose each flu season. Some inactivated flu vaccines contain a very small amount of a mercury-based preservative called thimerosal. Studies have not shown thimerosal in vaccines to be harmful, but flu vaccines that do not contain thimerosal are available. There is no live flu virus in flu shots. They cannot cause the flu. There are many flu viruses, and they are always changing.  Each year a new flu vaccine is made to protect against three or four viruses that are likely to cause disease in the upcoming flu season. But even when the vaccine doesn't exactly match these viruses, it may still provide some protection. Flu vaccine cannot prevent:  · Flu that is caused by a virus not covered by the vaccine. · Illnesses that look like flu but are not. Some people should not get this vaccine  Tell the person who is giving you the vaccine:  · If you have any severe (life-threatening) allergies. If you ever had a life-threatening allergic reaction after a dose of flu vaccine, or have a severe allergy to any part of this vaccine, you may be advised not to get vaccinated. Most, but not all, types of flu vaccine contain a small amount of egg protein. · If you ever had Guillain-Barré syndrome (also called GBS) Some people with a history of GBS should not get this vaccine. This should be discussed with your doctor. · If you are not feeling well. It is usually okay to get flu vaccine when you have a mild illness, but you might be asked to come back when you feel better. Risks of a vaccine reaction  With any medicine, including vaccines, there is a chance of reactions. These are usually mild and go away on their own, but serious reactions are also possible. Most people who get a flu shot do not have any problems with it. Minor problems following a flu shot include:  · Soreness, redness, or swelling where the shot was given  · Hoarseness  · Sore, red or itchy eyes  · Cough  · Fever  · Aches  · Headache  · Itching  · Fatigue  If these problems occur, they usually begin soon after the shot and last 1 or 2 days. More serious problems following a flu shot can include the following:  · There may be a small increased risk of Guillain-Barré Syndrome (GBS) after inactivated flu vaccine. This risk has been estimated at 1 or 2 additional cases per million people vaccinated. This is much lower than the risk of severe complications from flu, which can be prevented by flu vaccine.   · [place] children who get the flu shot along with pneumococcal vaccine (PCV13) and/or DTaP vaccine at the same time might be slightly more likely to have a seizure caused by fever. Ask your doctor for more information. Tell your doctor if a child who is getting flu vaccine has ever had a seizure  Problems that could happen after any injected vaccine:  · People sometimes faint after a medical procedure, including vaccination. Sitting or lying down for about 15 minutes can help prevent fainting, and injuries caused by a fall. Tell your doctor if you feel dizzy, or have vision changes or ringing in the ears. · Some people get severe pain in the shoulder and have difficulty moving the arm where a shot was given. This happens very rarely. · Any medication can cause a severe allergic reaction. Such reactions from a vaccine are very rare, estimated at about 1 in a million doses, and would happen within a few minutes to a few hours after the vaccination. As with any medicine, there is a very remote chance of a vaccine causing a serious injury or death. The safety of vaccines is always being monitored. For more information, visit: www.cdc.gov/vaccinesafety/. What if there is a serious reaction? What should I look for? · Look for anything that concerns you, such as signs of a severe allergic reaction, very high fever, or unusual behavior. Signs of a severe allergic reaction can include hives, swelling of the face and throat, difficulty breathing, a fast heartbeat, dizziness, and weakness - usually within a few minutes to a few hours after the vaccination. What should I do? · If you think it is a severe allergic reaction or other emergency that can't wait, call 9-1-1 and get the person to the nearest hospital. Otherwise, call your doctor. · Reactions should be reported to the \"Vaccine Adverse Event Reporting System\" (VAERS). Your doctor should file this report, or you can do it yourself through the VAERS website at www.vaers. [facility].gov, or by calling 6-090-468-0247. [facility] does not give medical advice. The National Vaccine Injury Compensation Program  The National Vaccine Injury Compensation Program (VICP) is a federal program that was created to compensate people who may have been injured by certain vaccines. Persons who believe they may have been injured by a vaccine can learn about the program and about filing a claim by calling 4-309.708.5942 or visiting the [employer] website at www.[facility].gov/vaccinecompensation. There is a time limit to file a claim for compensation. How can I learn more? · Ask your healthcare provider. He or she can give you the vaccine package insert or suggest other sources of information. · Call your local or state health department. · Contact the Centers for Disease Control and Prevention (CDC):  ¨ Call 1-720.724.4744 (1-800-CDC-INFO) or  ¨ Visit CDC's website at www.cdc.gov/flu  Vaccine Information Statement  Inactivated Influenza Vaccine  8/7/2015)  42 [facility] Officer [id number]-[id number]  Department of Health and Human Services  Centers for Disease Control and Prevention  Many Vaccine Information Statements are available in [place] and other languages. See www.immunize.org/vis. Muchas hojas de información sobre vacunas están disponibles en español y en otros idiomas. Visite www.immunize.org/vis. Care instructions adapted under license by [facility] ([facility]). If you have questions about a medical condition or this instruction, always ask your healthcare professional. [place] [address] any warranty or liability for your use of this information.

## 2018-09-19 ENCOUNTER — OFFICE VISIT (OUTPATIENT)
Dept: CARDIOLOGY CLINIC | Age: 83
End: 2018-09-19
Payer: MEDICARE

## 2018-09-19 VITALS
HEIGHT: 70 IN | SYSTOLIC BLOOD PRESSURE: 100 MMHG | DIASTOLIC BLOOD PRESSURE: 60 MMHG | HEART RATE: 70 BPM | BODY MASS INDEX: 22.68 KG/M2 | WEIGHT: 158.4 LBS | OXYGEN SATURATION: 98 %

## 2018-09-19 DIAGNOSIS — I44.7 LEFT BUNDLE BRANCH BLOCK: ICD-10-CM

## 2018-09-19 DIAGNOSIS — I10 ESSENTIAL HYPERTENSION: ICD-10-CM

## 2018-09-19 DIAGNOSIS — I42.9 CARDIOMYOPATHY, UNSPECIFIED TYPE (HCC): Primary | ICD-10-CM

## 2018-09-19 DIAGNOSIS — I48.19 PERSISTENT ATRIAL FIBRILLATION (HCC): ICD-10-CM

## 2018-09-19 PROCEDURE — 99213 OFFICE O/P EST LOW 20 MIN: CPT | Performed by: INTERNAL MEDICINE

## 2018-09-19 NOTE — PROGRESS NOTES
Other Topics Concern    Not on file     Social History Narrative    No narrative on file       Allergies   Allergen Reactions    Morphine      Other reaction(s): Delirium       Current Outpatient Prescriptions   Medication Sig Dispense Refill    metoprolol tartrate (LOPRESSOR) 50 MG tablet Take 1 tablet by mouth daily 30 tablet 2    IRON PO Take by mouth Unsure of the strength of the medication  He is taking 1 qd      simvastatin (ZOCOR) 20 MG tablet Take 1 tablet by mouth nightly 90 tablet 0    clopidogrel (PLAVIX) 75 MG tablet Take 1 tablet by mouth daily 90 tablet 0    omeprazole (PRILOSEC) 40 MG delayed release capsule Take 1 capsule by mouth daily 90 capsule 0    tamsulosin (FLOMAX) 0.4 MG capsule TAKE 1 CAPSULE DAILY 90 capsule 0    Psyllium (METAMUCIL) 48.57 % POWD Take by mouth      polyethylene glycol (GLYCOLAX) powder Take 17 g by mouth daily      ondansetron (ZOFRAN) 4 MG tablet TAKE 1 TO 2 TABLETS EVERY 4 TO 6 HOURS AS NEEDED FOR NAUSEA AND VOMITING (Patient taking differently: Take 4 mg by mouth every 4-6 hours as needed TAKE 1 TO 2 TABLETS EVERY 4 TO 6 HOURS AS NEEDED FOR NAUSEA AND VOMITING) 30 tablet 5    aspirin 81 MG EC tablet Take 81 mg by mouth daily      bimatoprost (LUMIGAN) 0.01 % SOLN ophthalmic drops Place 1 drop into both eyes nightly 1 drop both eyes daily at bedtime       No current facility-administered medications for this visit. Review of Systems:   Review of Systems   Constitutional: Negative for activity change and appetite change. HENT: Negative for congestion. Respiratory: Negative for apnea, choking and chest tightness. Cardiovascular: Negative for chest pain. Gastrointestinal: Negative for abdominal distention and abdominal pain. Endocrine: Negative for cold intolerance and heat intolerance. Genitourinary: Negative for dysuria and enuresis. Musculoskeletal: Negative for arthralgias and back pain. Skin: Negative for color change.

## 2018-09-25 PROCEDURE — 93227 XTRNL ECG REC<48 HR R&I: CPT | Performed by: INTERNAL MEDICINE

## 2018-09-26 ENCOUNTER — TELEPHONE (OUTPATIENT)
Dept: PHARMACY | Facility: CLINIC | Age: 83
End: 2018-09-26

## 2018-10-12 ENCOUNTER — OFFICE VISIT (OUTPATIENT)
Dept: FAMILY MEDICINE CLINIC | Age: 83
End: 2018-10-12
Payer: MEDICARE

## 2018-10-12 VITALS
SYSTOLIC BLOOD PRESSURE: 138 MMHG | TEMPERATURE: 96.7 F | WEIGHT: 158 LBS | BODY MASS INDEX: 22.62 KG/M2 | HEART RATE: 68 BPM | HEIGHT: 70 IN | RESPIRATION RATE: 20 BRPM | DIASTOLIC BLOOD PRESSURE: 86 MMHG

## 2018-10-12 DIAGNOSIS — N30.01 ACUTE CYSTITIS WITH HEMATURIA: ICD-10-CM

## 2018-10-12 DIAGNOSIS — E78.5 HYPERLIPIDEMIA, UNSPECIFIED HYPERLIPIDEMIA TYPE: ICD-10-CM

## 2018-10-12 DIAGNOSIS — E11.9 TYPE 2 DIABETES MELLITUS WITHOUT COMPLICATION, WITHOUT LONG-TERM CURRENT USE OF INSULIN (HCC): Primary | ICD-10-CM

## 2018-10-12 DIAGNOSIS — I10 ESSENTIAL HYPERTENSION: ICD-10-CM

## 2018-10-12 LAB
BILIRUBIN, POC: NORMAL
BLOOD URINE, POC: NORMAL
CLARITY, POC: NORMAL
COLOR, POC: YELLOW
GLUCOSE URINE, POC: NORMAL
KETONES, POC: NORMAL
LEUKOCYTE EST, POC: NORMAL
NITRITE, POC: NORMAL
PH, POC: 6
PROTEIN, POC: NORMAL
SPECIFIC GRAVITY, POC: 1.01
UROBILINOGEN, POC: NORMAL

## 2018-10-12 PROCEDURE — 81002 URINALYSIS NONAUTO W/O SCOPE: CPT | Performed by: FAMILY MEDICINE

## 2018-10-12 PROCEDURE — 99213 OFFICE O/P EST LOW 20 MIN: CPT | Performed by: FAMILY MEDICINE

## 2018-10-12 ASSESSMENT — ENCOUNTER SYMPTOMS
EYE DISCHARGE: 0
SORE THROAT: 0
BACK PAIN: 1
COUGH: 0
SINUS PRESSURE: 0
SHORTNESS OF BREATH: 0
CONSTIPATION: 0
DIARRHEA: 0
EYE ITCHING: 0
ABDOMINAL PAIN: 0

## 2018-10-12 NOTE — PROGRESS NOTES
Didier Dexter MD at [facility] 58  08/12/2016    left kidney, Dr. Garrison Innocent Marital status:      Spouse name: N/A    Number of children: N/A    Years of education: N/A     Occupational History    Not on file.      Social History Main Topics    Smoking status: Never Smoker    Smokeless tobacco: Never Used    Alcohol use Yes      Comment: Rarely beer    Drug use: No    Sexual activity: Not on file     Other Topics Concern    Not on file     Social History Narrative    No narrative on file     Family History   Problem Relation Age of Onset    Heart Attack Father     Heart Attack Brother      Allergies   Allergen Reactions    Morphine      Other reaction(s): Delirium     Current Outpatient Prescriptions   Medication Sig Dispense Refill    metoprolol tartrate (LOPRESSOR) 50 MG tablet Take 1 tablet by mouth daily 30 tablet 2    IRON PO Take by mouth Unsure of the strength of the medication  He is taking 1 qd      simvastatin (ZOCOR) 20 MG tablet Take 1 tablet by mouth nightly 90 tablet 0    clopidogrel (PLAVIX) 75 MG tablet Take 1 tablet by mouth daily 90 tablet 0    omeprazole (PRILOSEC) 40 MG delayed release capsule Take 1 capsule by mouth daily 90 capsule 0    tamsulosin (FLOMAX) 0.4 MG capsule TAKE 1 CAPSULE DAILY 90 capsule 0    Psyllium (METAMUCIL) 48.57 % POWD Take by mouth      polyethylene glycol (GLYCOLAX) powder Take 17 g by mouth daily      ondansetron (ZOFRAN) 4 MG tablet TAKE 1 TO 2 TABLETS EVERY 4 TO 6 HOURS AS NEEDED FOR NAUSEA AND VOMITING (Patient taking differently: Take 4 mg by mouth every 4-6 hours as needed TAKE 1 TO 2 TABLETS EVERY 4 TO 6 HOURS AS NEEDED FOR NAUSEA AND VOMITING) 30 tablet 5    aspirin 81 MG EC tablet Take 81 mg by mouth daily      bimatoprost (LUMIGAN) 0.01 % SOLN ophthalmic drops Place 1 drop into both eyes nightly 1 drop both eyes daily at bedtime       No current

## 2018-10-14 LAB — REJECTED TEST: NORMAL

## 2018-10-30 ENCOUNTER — HOSPITAL ENCOUNTER (OUTPATIENT)
Dept: CARDIAC CATH/INVASIVE PROCEDURES | Age: 83
Discharge: HOME | End: 2018-10-31
Attending: SPECIALIST | Admitting: SPECIALIST
Payer: MEDICARE

## 2018-10-30 ENCOUNTER — APPOINTMENT (OUTPATIENT)
Dept: GENERAL RADIOLOGY | Age: 83
End: 2018-10-30
Attending: SPECIALIST
Payer: MEDICARE

## 2018-10-30 PROCEDURE — 6370000000 HC RX 637 (ALT 250 FOR IP): Performed by: SPECIALIST

## 2018-10-30 PROCEDURE — 33249 INSJ/RPLCMT DEFIB W/LEAD(S): CPT | Performed by: SPECIALIST

## 2018-10-30 PROCEDURE — 2580000003 HC RX 258

## 2018-10-30 PROCEDURE — C1777 LEAD, AICD, ENDO SINGLE COIL: HCPCS

## 2018-10-30 PROCEDURE — 6360000002 HC RX W HCPCS

## 2018-10-30 PROCEDURE — 2709999900 HC NON-CHARGEABLE SUPPLY

## 2018-10-30 PROCEDURE — C1894 INTRO/SHEATH, NON-LASER: HCPCS

## 2018-10-30 PROCEDURE — 6360000002 HC RX W HCPCS: Performed by: SPECIALIST

## 2018-10-30 PROCEDURE — C1898 LEAD, PMKR, OTHER THAN TRANS: HCPCS

## 2018-10-30 PROCEDURE — C1722 AICD, SINGLE CHAMBER: HCPCS

## 2018-10-30 PROCEDURE — 2580000003 HC RX 258: Performed by: SPECIALIST

## 2018-10-30 PROCEDURE — 2500000003 HC RX 250 WO HCPCS

## 2018-10-30 PROCEDURE — 71045 X-RAY EXAM CHEST 1 VIEW: CPT

## 2018-10-30 PROCEDURE — C1725 CATH, TRANSLUMIN NON-LASER: HCPCS

## 2018-10-30 PROCEDURE — C1892 INTRO/SHEATH,FIXED,PEEL-AWAY: HCPCS

## 2018-10-30 RX ORDER — CLOPIDOGREL BISULFATE 75 MG/1
75 TABLET ORAL DAILY
Status: DISCONTINUED | OUTPATIENT
Start: 2018-10-30 | End: 2018-10-31 | Stop reason: HOSPADM

## 2018-10-30 RX ORDER — OMEPRAZOLE 20 MG/1
40 CAPSULE, DELAYED RELEASE ORAL DAILY
Status: DISCONTINUED | OUTPATIENT
Start: 2018-10-30 | End: 2018-10-30 | Stop reason: CLARIF

## 2018-10-30 RX ORDER — TAMSULOSIN HYDROCHLORIDE 0.4 MG/1
0.4 CAPSULE ORAL DAILY
Status: DISCONTINUED | OUTPATIENT
Start: 2018-10-30 | End: 2018-10-31 | Stop reason: HOSPADM

## 2018-10-30 RX ORDER — SIMVASTATIN 20 MG
20 TABLET ORAL NIGHTLY
Status: DISCONTINUED | OUTPATIENT
Start: 2018-10-30 | End: 2018-10-31 | Stop reason: HOSPADM

## 2018-10-30 RX ORDER — PANTOPRAZOLE SODIUM 40 MG/1
40 TABLET, DELAYED RELEASE ORAL
Status: DISCONTINUED | OUTPATIENT
Start: 2018-10-31 | End: 2018-10-31 | Stop reason: HOSPADM

## 2018-10-30 RX ORDER — SODIUM CHLORIDE 450 MG/100ML
INJECTION, SOLUTION INTRAVENOUS CONTINUOUS
Status: DISCONTINUED | OUTPATIENT
Start: 2018-10-30 | End: 2018-10-31 | Stop reason: HOSPADM

## 2018-10-30 RX ORDER — SODIUM CHLORIDE 0.9 % (FLUSH) 0.9 %
10 SYRINGE (ML) INJECTION EVERY 12 HOURS SCHEDULED
Status: DISCONTINUED | OUTPATIENT
Start: 2018-10-30 | End: 2018-10-31 | Stop reason: HOSPADM

## 2018-10-30 RX ORDER — SODIUM CHLORIDE 0.9 % (FLUSH) 0.9 %
10 SYRINGE (ML) INJECTION PRN
Status: DISCONTINUED | OUTPATIENT
Start: 2018-10-30 | End: 2018-10-31 | Stop reason: HOSPADM

## 2018-10-30 RX ORDER — ASPIRIN 81 MG/1
81 TABLET ORAL DAILY
Status: DISCONTINUED | OUTPATIENT
Start: 2018-10-30 | End: 2018-10-31 | Stop reason: HOSPADM

## 2018-10-30 RX ORDER — ONDANSETRON 2 MG/ML
4 INJECTION INTRAMUSCULAR; INTRAVENOUS EVERY 6 HOURS PRN
Status: DISCONTINUED | OUTPATIENT
Start: 2018-10-30 | End: 2018-10-31 | Stop reason: HOSPADM

## 2018-10-30 RX ORDER — ACETAMINOPHEN 325 MG/1
650 TABLET ORAL EVERY 4 HOURS PRN
Status: DISCONTINUED | OUTPATIENT
Start: 2018-10-30 | End: 2018-10-31 | Stop reason: HOSPADM

## 2018-10-30 RX ORDER — METOPROLOL TARTRATE 50 MG/1
50 TABLET, FILM COATED ORAL DAILY
Status: DISCONTINUED | OUTPATIENT
Start: 2018-10-30 | End: 2018-10-31 | Stop reason: HOSPADM

## 2018-10-30 RX ADMIN — SODIUM CHLORIDE 1000 ML: 4.5 INJECTION, SOLUTION INTRAVENOUS at 07:01

## 2018-10-30 RX ADMIN — SIMVASTATIN 20 MG: 20 TABLET, FILM COATED ORAL at 20:41

## 2018-10-30 RX ADMIN — VANCOMYCIN HYDROCHLORIDE 1000 MG: 1 INJECTION, POWDER, LYOPHILIZED, FOR SOLUTION INTRAVENOUS at 08:19

## 2018-10-30 RX ADMIN — ACETAMINOPHEN 650 MG: 325 TABLET ORAL at 20:41

## 2018-10-30 RX ADMIN — Medication 10 ML: at 20:41

## 2018-10-30 ASSESSMENT — PAIN DESCRIPTION - PAIN TYPE: TYPE: SURGICAL PAIN

## 2018-10-30 ASSESSMENT — PAIN DESCRIPTION - LOCATION: LOCATION: CHEST

## 2018-10-30 ASSESSMENT — PAIN DESCRIPTION - ONSET: ONSET: GRADUAL

## 2018-10-30 ASSESSMENT — PAIN SCALES - GENERAL: PAINLEVEL_OUTOF10: 7

## 2018-10-30 ASSESSMENT — PAIN DESCRIPTION - PROGRESSION: CLINICAL_PROGRESSION: GRADUALLY WORSENING

## 2018-10-30 ASSESSMENT — PAIN DESCRIPTION - ORIENTATION: ORIENTATION: LEFT

## 2018-10-30 ASSESSMENT — PAIN DESCRIPTION - FREQUENCY: FREQUENCY: INTERMITTENT

## 2018-10-30 ASSESSMENT — PAIN DESCRIPTION - DESCRIPTORS: DESCRIPTORS: ACHING

## 2018-10-30 NOTE — PROGRESS NOTES
Patient will be brought back to pre and post and identified and given a copy of rights and responsibilities.

## 2018-10-30 NOTE — OP NOTE
Elaina Funk [address]                     [address], [address] [facility]                               OPERATIVE REPORT    PATIENT NAME: Grisel Bower                     :         1934  MED REC NO:   [id number]                            ROOM:  ACCOUNT NO:   [de-identified]                           ADMIT DATE:  10/30/2018  PROVIDER:     Bisi Peters MD    DATE OF PROCEDURE:  10/30/2018    PROCEDURE PERFORMED:  Bi-V ICD implant. 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 The LV lead failed to be  implanted despite repeated attempts with different leads and different  approaches. The patient ended up having regular ICD. OPERATIVE PROCEDURE:  The patient was brought to the EP lab in the  postabsorptive state. The vitals were stable. Informed consent was  obtained. The left hemithorax was prepared and draped in the usual  manner. The left infraclavicular fossa was infiltrated with 2%  Xylocaine. An incision was made two fingerbreadths below the left  clavicle and deepened down to the pectoralis fascia. A pocket was  made by dissection. Hemostasis was secured. Using the Seldinger  technique, the left axillary vein guided by caudal 30-degree  fluoroscopy was anchored three separate times. Three separate  guidewires were advanced under direct fluoroscopy to the junction of  the superior vena cava and the right atrium. A #9-Ivorian and  #7-Ivorian sheath introducers were advanced over one of the guidewires  at a time.   The dilator Device detected V-fib and  terminated it with VFT of lower than 18 joules with the resultant  sinus rhythm. The patient tolerated the procedure well. The  hemostasis was evident. The subcutaneous tissue was closed using 3-0  Vicryl in two separate layers. The subcuticular tissue was closed  using 4-0 Vicryl. The wound was bandaged in the usual fashion. The  patient tolerated the procedure well and was discharged from the EP  lab in a stable condition.         Nataliya Hameed MD    D: 10/30/2018 12:07:48       T: 10/30/2018 13:09:11     [id number]  Job#: [id number]     Doc#: [id number]    CC:  MD Marcella Armstrong MD Ashley Rama, MD

## 2018-10-30 NOTE — PROGRESS NOTES
Patient returned from the cath lab with an aquacell dressing to left chest area which is clean and dry.

## 2018-10-30 NOTE — FLOWSHEET NOTE
Patient arrived from cath holding, lt chest aquacell dressing D&I. No swelling or drainage noted. Patient denies pain. Patient oriented to room.

## 2018-10-31 ENCOUNTER — APPOINTMENT (OUTPATIENT)
Dept: GENERAL RADIOLOGY | Age: 83
End: 2018-10-31
Attending: SPECIALIST
Payer: MEDICARE

## 2018-10-31 VITALS
TEMPERATURE: 97.3 F | HEART RATE: 84 BPM | WEIGHT: 152.12 LBS | RESPIRATION RATE: 16 BRPM | BODY MASS INDEX: 22.53 KG/M2 | DIASTOLIC BLOOD PRESSURE: 69 MMHG | OXYGEN SATURATION: 96 % | HEIGHT: 69 IN | SYSTOLIC BLOOD PRESSURE: 136 MMHG

## 2018-10-31 PROCEDURE — 6370000000 HC RX 637 (ALT 250 FOR IP): Performed by: SPECIALIST

## 2018-10-31 PROCEDURE — 2580000003 HC RX 258: Performed by: SPECIALIST

## 2018-10-31 PROCEDURE — 99217 PR OBSERVATION CARE DISCHARGE MANAGEMENT: CPT | Performed by: INTERNAL MEDICINE

## 2018-10-31 PROCEDURE — 6360000002 HC RX W HCPCS: Performed by: SPECIALIST

## 2018-10-31 PROCEDURE — 71046 X-RAY EXAM CHEST 2 VIEWS: CPT

## 2018-10-31 RX ORDER — ASPIRIN 81 MG/1
81 TABLET ORAL DAILY
Qty: 30 TABLET | Refills: 3 | Status: SHIPPED
Start: 2018-10-31 | End: 2020-02-27 | Stop reason: DRUGHIGH

## 2018-10-31 RX ORDER — PANTOPRAZOLE SODIUM 40 MG/1
40 TABLET, DELAYED RELEASE ORAL
Qty: 30 TABLET | Refills: 3 | Status: SHIPPED | OUTPATIENT
Start: 2018-10-31 | End: 2019-02-05 | Stop reason: ALTCHOICE

## 2018-10-31 RX ORDER — TAMSULOSIN HYDROCHLORIDE 0.4 MG/1
0.4 CAPSULE ORAL DAILY
Qty: 30 CAPSULE | Refills: 3 | Status: SHIPPED | OUTPATIENT
Start: 2018-10-31 | End: 2019-01-22 | Stop reason: SDUPTHER

## 2018-10-31 RX ADMIN — CLOPIDOGREL BISULFATE 75 MG: 75 TABLET ORAL at 08:35

## 2018-10-31 RX ADMIN — VANCOMYCIN HYDROCHLORIDE 1000 MG: 1 INJECTION, POWDER, LYOPHILIZED, FOR SOLUTION INTRAVENOUS at 08:34

## 2018-10-31 RX ADMIN — ONDANSETRON 4 MG: 2 INJECTION INTRAMUSCULAR; INTRAVENOUS at 05:12

## 2018-10-31 RX ADMIN — PANTOPRAZOLE SODIUM 40 MG: 40 TABLET, DELAYED RELEASE ORAL at 08:35

## 2018-10-31 RX ADMIN — TAMSULOSIN HYDROCHLORIDE 0.4 MG: 0.4 CAPSULE ORAL at 08:35

## 2018-10-31 RX ADMIN — ASPIRIN 81 MG: 81 TABLET ORAL at 08:34

## 2018-10-31 RX ADMIN — METOPROLOL TARTRATE 50 MG: 50 TABLET ORAL at 08:34

## 2018-10-31 RX ADMIN — Medication 10 ML: at 08:35

## 2018-10-31 ASSESSMENT — PAIN SCALES - GENERAL: PAINLEVEL_OUTOF10: 0

## 2018-11-01 ENCOUNTER — TELEPHONE (OUTPATIENT)
Dept: PHARMACY | Facility: CLINIC | Age: 83
End: 2018-11-01

## 2018-11-01 DIAGNOSIS — Z79.899 ENCOUNTER FOR MEDICATION REVIEW: Primary | ICD-10-CM

## 2018-11-01 PROCEDURE — 1111F DSCHRG MED/CURRENT MED MERGE: CPT | Performed by: PHARMACIST

## 2018-11-01 RX ORDER — LATANOPROST 50 UG/ML
1 SOLUTION/ DROPS OPHTHALMIC NIGHTLY
COMMUNITY

## 2018-11-07 ENCOUNTER — CARE COORDINATION (OUTPATIENT)
Dept: CARE COORDINATION | Age: 83
End: 2018-11-07

## 2018-11-12 ENCOUNTER — CARE COORDINATION (OUTPATIENT)
Dept: CARE COORDINATION | Age: 83
End: 2018-11-12

## 2018-11-13 ENCOUNTER — CARE COORDINATION (OUTPATIENT)
Dept: CARE COORDINATION | Age: 83
End: 2018-11-13

## 2018-11-13 NOTE — PATIENT INSTRUCTIONS
months;  · frequent chest pain, heartburn with wheezing;  · unexplained weight loss;  · nausea or vomiting, stomach pain;  · liver disease;  · low levels of magnesium in your blood; or  · osteoporosis or low bone mineral density (osteopenia). You may be more likely to have a broken bone in your hip, wrist, or spine while taking a proton pump inhibitor. Talk with your doctor about ways to keep your bones healthy. It is not known whether this medicine will harm an unborn baby. Tell your doctor if you are pregnant or plan to become pregnant. Do not use this medicine without a doctor's advice if you are breast-feeding. Do not give this medicine to a child without medical advice. How should I take omeprazole? Follow all directions on your prescription label and read all medication guides or instruction sheets. Use the medicine exactly as directed. Use Prilosec OTC (over-the-counter) exactly as directed on the label, or as prescribed by your doctor. Read and carefully follow any Instructions for Use provided with your medicine. Ask your doctor or pharmacist if you do not understand these instructions. Shake the oral suspension (liquid) before you measure a dose. Use the dosing syringe provided, or use a medicine dose-measuring device (not a kitchen spoon). If you cannot swallow a capsule whole, open it and sprinkle the medicine into a spoonful of applesauce. Swallow the mixture right away without chewing. Do not save it for later use. You must dissolve omeprazole powder in a small amount of water. This mixture can either be swallowed or given through a nasogastric (NG) feeding tube using a catheter-tipped syringe. OTC omeprazole should be taken for only 14 days in a row. Allow at least 4 months to pass before you start a new 14-day course of treatment. Use this medicine for the full prescribed length of time, even if your symptoms quickly improve.   Taking omeprazole long-term could cause you to develop a vitamin B-12 deficiency. Talk to your doctor about how to manage this condition. Call your doctor if your symptoms do not improve, or if they get worse. Some conditions are treated with a combination of omeprazole and antibiotics. Use all medications as directed and read all medication guides you receive. Do not change your dose or dosing schedule without your doctor's advice. This medicine can affect the results of certain medical tests. Tell any doctor who treats you that you are using omeprazole. Store at room temperature away from moisture and heat. What happens if I miss a dose? Take the medicine as soon as you can, but skip the missed dose if it is almost time for your next dose. Do not take two doses at one time. What happens if I overdose? Seek emergency medical attention or call the Poison Help line at 1-777.303.3022. What should I avoid while taking omeprazole? Diarrhea may be a sign of a new infection. If you have diarrhea that is watery or bloody, call your doctor before using anti-diarrhea medicine. What are the possible side effects of omeprazole? Get emergency medical help if you have signs of an allergic reaction: hives; difficulty breathing; swelling of your face, lips, tongue, or throat. Stop using omeprazole and call your doctor at once if you have:  · severe stomach pain, diarrhea that is watery or bloody;  · a rash or joint pain;  · new or unusual pain in your wrist, thigh, hip, or back;  · seizure (convulsions);  · kidney problems --urinating more or less than usual, blood in your urine, swelling, rapid weight gain; or  · symptoms of low magnesium --drowsiness, confusion, feeling irritable, fast heartbeats, tremors, twitching, muscle cramps, numbness, tingling, or seizure. Common side effects may include:  · stomach pain, gas;  · nausea, vomiting, diarrhea; or  · headache. This is not a complete list of side effects and others may occur.  Call your doctor for medical advice about (injection). A healthcare provider may teach you how to properly use pantoprazole injection by yourself. Pantoprazole tablets are taken by mouth, with or without food. Pantoprazole oral granules should be taken 30 minutes before a meal.  Do not crush, chew, or break the tablet. Swallow it whole. The oral granules should be mixed with applesauce or apple juice and given either by mouth or through a nasogastric (NG) tube. Read and carefully follow any Instructions for Use provided with your medicine. Ask your doctor or pharmacist if you have any questions. Use this medicine for the full prescribed length of time. Your symptoms may improve before the condition is fully treated. If you use pantoprazole for longer than 3 years, you could develop a vitamin B-12 deficiency. Talk to your doctor about how to manage this condition. Call your doctor if your symptoms do not improve or if they get worse while you are using this medicine. Pantoprazole can cause false results with certain medical tests, including a urine drug screening test. Tell the doctor or laboratory staff that you are using this medicine. Store this medicine at room temperature away from moisture, heat, and light. What happens if I miss a dose? Use the medicine as soon as you can, but skip the missed dose if it is almost time for your next dose. Do not use two doses at one time. What happens if I overdose? Seek emergency medical attention or call the Poison Help line at 1-539.847.4727. What should I avoid while using pantoprazole? This medicine can cause diarrhea, which may be a sign of a new infection. If you have diarrhea that is watery or bloody, call your doctor. Do not use anti-diarrhea medicine unless your doctor tells you to. What are the possible side effects of pantoprazole? Get emergency medical help if you have signs of an allergic reaction: hives; difficulty breathing; swelling of your face, lips, tongue, or throat.   Call your

## 2018-11-13 NOTE — CARE COORDINATION
Declined  Zone Management Tools:  Completed (Comment: Diabetes, CHF)         Goals Addressed             Most Recent     Medication Management   On track (11/13/2018)             I will take my medication as directed. Barriers: lack of support and lack of education  Plan for overcoming my barriers: I will bring all medications to PCP vs for review with [facility]. I will work with [facility] to learn how to use a med-minder box to manage my daily medications. Confidence: 7/10  Anticipated Goal Completion Date: 1 month       Reduce Falls    On track (11/13/2018)             I will reduce my risk of falls by the following: I will review and implement falls prevention instructions     Barriers: lack of education  Plan for overcoming my barriers: I will read education handouts sent ot me re: falls prevention, home safety, how to get up from a fall. Confidence: 6/10  Anticipated Goal Completion Date: 1 month            Prior to Admission medications    Medication Sig Start Date End Date Taking?  Authorizing Provider   latanoprost (XALATAN) 0.005 % ophthalmic solution Place 1 drop into both eyes nightly   Yes Historical Provider, MD   pantoprazole (PROTONIX) 40 MG tablet Take 1 tablet by mouth every morning (before breakfast) 10/31/18  Yes Arcelia Trotter MD   aspirin 81 MG EC tablet Take 1 tablet by mouth daily 10/31/18  Yes Arcelia Trotter MD   tamsulosin [facility]) 0.4 MG capsule Take 1 capsule by mouth daily 10/31/18  Yes Arcelia Trotter MD   metoprolol tartrate (LOPRESSOR) 50 MG tablet Take 1 tablet by mouth daily 9/14/18  Yes Mallika Barlow MD   IRON PO Take 1 tablet by mouth daily as needed Unsure of the strength of the medication  He is taking 1 qd    Yes Historical Provider, MD   simvastatin (ZOCOR) 20 MG tablet Take 1 tablet by mouth nightly 8/13/18  Yes Mallika Barlow MD   clopidogrel (PLAVIX) 75 MG tablet Take 1 tablet by mouth daily 8/13/18  Yes Malilka Barlow MD   Psyllium (METAMUCIL) 48.57 % POWD Take by mouth

## 2018-11-14 ENCOUNTER — OFFICE VISIT (OUTPATIENT)
Dept: CARDIOLOGY CLINIC | Age: 83
End: 2018-11-14
Payer: MEDICARE

## 2018-11-14 VITALS
RESPIRATION RATE: 18 BRPM | HEIGHT: 69 IN | SYSTOLIC BLOOD PRESSURE: 118 MMHG | BODY MASS INDEX: 23.7 KG/M2 | WEIGHT: 160 LBS | DIASTOLIC BLOOD PRESSURE: 74 MMHG | OXYGEN SATURATION: 98 % | HEART RATE: 79 BPM

## 2018-11-14 DIAGNOSIS — I25.84 CORONARY ATHEROSCLEROSIS DUE TO CALCIFIED CORONARY LESION (CODE): Primary | ICD-10-CM

## 2018-11-14 DIAGNOSIS — I10 ESSENTIAL HYPERTENSION: ICD-10-CM

## 2018-11-14 DIAGNOSIS — I44.7 LEFT BUNDLE BRANCH BLOCK: ICD-10-CM

## 2018-11-14 DIAGNOSIS — I48.19 PERSISTENT ATRIAL FIBRILLATION (HCC): ICD-10-CM

## 2018-11-14 DIAGNOSIS — Z95.810 ICD (IMPLANTABLE CARDIOVERTER-DEFIBRILLATOR) IN PLACE: ICD-10-CM

## 2018-11-14 DIAGNOSIS — K55.9 MESENTERIC ISCHEMIA (HCC): ICD-10-CM

## 2018-11-14 DIAGNOSIS — I42.9 CARDIOMYOPATHY, UNSPECIFIED TYPE (HCC): ICD-10-CM

## 2018-11-14 DIAGNOSIS — E78.5 HYPERLIPIDEMIA, UNSPECIFIED HYPERLIPIDEMIA TYPE: ICD-10-CM

## 2018-11-14 PROCEDURE — 99213 OFFICE O/P EST LOW 20 MIN: CPT | Performed by: INTERNAL MEDICINE

## 2018-11-14 PROCEDURE — 93000 ELECTROCARDIOGRAM COMPLETE: CPT | Performed by: INTERNAL MEDICINE

## 2018-11-14 NOTE — PROGRESS NOTES
[facility] CARDIOLOGY OFFICE FOLLOW-UP      Patient: Anibal Oliva  YOB: 1934  MRN: [id number]    Chief Complaint:  Chief Complaint   Patient presents with    Cardiomyopathy    Atrial Fibrillation    Follow-Up from Hospital     post defib    Anxiety       Subjective/HPI    The patient is followed in the office chronically for the following problems: ICM, CAD, CHF systolic class II, HTN, HPL, mesenteric ischemia prior SMA PTA. PAF. The last office visit focused on the following: followup and referral for ICD. Since the last office visit, the patient has not had new symptoms/events. Doing well after ICD. Incision good. Past Medical History:   Diagnosis Date    Abnormal finding on EKG 6/13/2016    Atrial fibrillation (HCC) 1/22/2018    BPH (benign prostatic hypertrophy)     Cancer (HCC)     GERD (gastroesophageal reflux disease)     Hyperlipidemia     Hypertension     Left bundle branch block 6/13/2016    Osteoarthritis     Sinus bradycardia on ECG 6/13/2016    Type II or unspecified type diabetes mellitus without mention of complication, not stated as uncontrolled        Past Surgical History:   Procedure Laterality Date    ANGIOPLASTY  05/08/2018    percutaneous transluminal angioplasty of the SMA with stent implantation, Constantine Workman    COLONOSCOPY  2016    MD ESOPHAGOGASTRODUODENOSCOPY TRANSORAL DIAGNOSTIC N/A 6/5/2018    EGD ESOPHAGOGASTRODUODENOSCOPY performed by Michael Phelps MD at [place] [address]  08/12/2016    left kidney, Dr. Terrie [facility]       Family History   Problem Relation Age of Onset    Heart Attack Father     Heart Attack Brother        Social History     Social History    Marital status:       Spouse name: N/A    Number of children: N/A    Years of education: N/A     Social History Main Topics    Smoking status: Never Smoker    Smokeless tobacco: Never Used    Alcohol use Yes      Comment: Rarely

## 2019-01-02 ENCOUNTER — OFFICE VISIT (OUTPATIENT)
Dept: FAMILY MEDICINE CLINIC | Age: 84
End: 2019-01-02
Payer: MEDICARE

## 2019-01-02 VITALS
HEIGHT: 69 IN | SYSTOLIC BLOOD PRESSURE: 120 MMHG | TEMPERATURE: 97.6 F | HEART RATE: 70 BPM | OXYGEN SATURATION: 97 % | DIASTOLIC BLOOD PRESSURE: 70 MMHG | BODY MASS INDEX: 24.53 KG/M2 | WEIGHT: 165.6 LBS

## 2019-01-02 DIAGNOSIS — E11.9 TYPE 2 DIABETES MELLITUS WITHOUT COMPLICATION, WITHOUT LONG-TERM CURRENT USE OF INSULIN (HCC): ICD-10-CM

## 2019-01-02 DIAGNOSIS — R31.0 GROSS HEMATURIA: ICD-10-CM

## 2019-01-02 DIAGNOSIS — I10 ESSENTIAL HYPERTENSION: ICD-10-CM

## 2019-01-02 DIAGNOSIS — R10.84 ABDOMINAL PAIN, GENERALIZED: ICD-10-CM

## 2019-01-02 DIAGNOSIS — E11.9 TYPE 2 DIABETES MELLITUS WITHOUT COMPLICATION, WITHOUT LONG-TERM CURRENT USE OF INSULIN (HCC): Primary | ICD-10-CM

## 2019-01-02 DIAGNOSIS — E78.5 HYPERLIPIDEMIA, UNSPECIFIED HYPERLIPIDEMIA TYPE: ICD-10-CM

## 2019-01-02 DIAGNOSIS — C68.9 UROTHELIAL CARCINOMA (HCC): ICD-10-CM

## 2019-01-02 LAB
ALBUMIN SERPL-MCNC: 4.4 G/DL (ref 3.9–4.9)
ALP BLD-CCNC: 61 U/L (ref 35–104)
ALT SERPL-CCNC: 12 U/L (ref 0–41)
ANION GAP SERPL CALCULATED.3IONS-SCNC: 15 MEQ/L (ref 7–13)
AST SERPL-CCNC: 16 U/L (ref 0–40)
BASOPHILS ABSOLUTE: 0 K/UL (ref 0–0.2)
BASOPHILS RELATIVE PERCENT: 0.2 %
BILIRUB SERPL-MCNC: 0.3 MG/DL (ref 0–1.2)
BILIRUBIN, POC: NORMAL
BLOOD URINE, POC: NORMAL
BUN BLDV-MCNC: 31 MG/DL (ref 8–23)
CALCIUM SERPL-MCNC: 9.2 MG/DL (ref 8.6–10.2)
CHLORIDE BLD-SCNC: 101 MEQ/L (ref 98–107)
CHOLESTEROL, TOTAL: 164 MG/DL (ref 0–199)
CLARITY, POC: CLEAR
CO2: 25 MEQ/L (ref 22–29)
COLOR, POC: NORMAL
CREAT SERPL-MCNC: 1.46 MG/DL (ref 0.7–1.2)
EOSINOPHILS ABSOLUTE: 0.1 K/UL (ref 0–0.7)
EOSINOPHILS RELATIVE PERCENT: 1 %
GFR AFRICAN AMERICAN: 55.6
GFR NON-AFRICAN AMERICAN: 45.9
GLOBULIN: 2.7 G/DL (ref 2.3–3.5)
GLUCOSE BLD-MCNC: 88 MG/DL (ref 74–109)
GLUCOSE URINE, POC: NORMAL
HBA1C MFR BLD: 5.9 % (ref 4.8–5.9)
HCT VFR BLD CALC: 36.9 % (ref 42–52)
HDLC SERPL-MCNC: 34 MG/DL (ref 40–59)
HEMOGLOBIN: 12.8 G/DL (ref 14–18)
KETONES, POC: NORMAL
LDL CHOLESTEROL CALCULATED: 63 MG/DL (ref 0–129)
LEUKOCYTE EST, POC: NORMAL
LYMPHOCYTES ABSOLUTE: 1.2 K/UL (ref 1–4.8)
LYMPHOCYTES RELATIVE PERCENT: 18.3 %
MCH RBC QN AUTO: 32.9 PG (ref 27–31.3)
MCHC RBC AUTO-ENTMCNC: 34.7 % (ref 33–37)
MCV RBC AUTO: 95 FL (ref 80–100)
MONOCYTES ABSOLUTE: 0.6 K/UL (ref 0.2–0.8)
MONOCYTES RELATIVE PERCENT: 9.7 %
NEUTROPHILS ABSOLUTE: 4.7 K/UL (ref 1.4–6.5)
NEUTROPHILS RELATIVE PERCENT: 70.8 %
NITRITE, POC: NORMAL
PDW BLD-RTO: 13.8 % (ref 11.5–14.5)
PH, POC: 7.5
PLATELET # BLD: 191 K/UL (ref 130–400)
POTASSIUM SERPL-SCNC: 5 MEQ/L (ref 3.5–5.1)
PROTEIN, POC: NORMAL
RBC # BLD: 3.88 M/UL (ref 4.7–6.1)
SODIUM BLD-SCNC: 141 MEQ/L (ref 132–144)
SPECIFIC GRAVITY, POC: 1
TOTAL PROTEIN: 7.1 G/DL (ref 6.4–8.1)
TRIGL SERPL-MCNC: 337 MG/DL (ref 0–200)
UROBILINOGEN, POC: NORMAL
WBC # BLD: 6.6 K/UL (ref 4.8–10.8)

## 2019-01-02 PROCEDURE — 81002 URINALYSIS NONAUTO W/O SCOPE: CPT | Performed by: FAMILY MEDICINE

## 2019-01-02 PROCEDURE — 99214 OFFICE O/P EST MOD 30 MIN: CPT | Performed by: FAMILY MEDICINE

## 2019-01-02 ASSESSMENT — ENCOUNTER SYMPTOMS
ABDOMINAL PAIN: 1
DIARRHEA: 0
COUGH: 0
EYE DISCHARGE: 0
EYE ITCHING: 0
SHORTNESS OF BREATH: 0
SINUS PRESSURE: 0
CONSTIPATION: 0
SORE THROAT: 0

## 2019-01-07 ENCOUNTER — CARE COORDINATION (OUTPATIENT)
Dept: CARE COORDINATION | Age: 84
End: 2019-01-07

## 2019-01-11 DIAGNOSIS — R11.0 NAUSEA: Primary | ICD-10-CM

## 2019-01-11 RX ORDER — ONDANSETRON 4 MG/1
TABLET, FILM COATED ORAL
Qty: 30 TABLET | Refills: 1 | Status: SHIPPED | OUTPATIENT
Start: 2019-01-11

## 2019-01-15 LAB — HEMOCCULT STL QL: NEGATIVE

## 2019-01-17 LAB
INR BLD: 1.07
PROTIME: 12.1 SECONDS

## 2019-01-18 ENCOUNTER — CARE COORDINATION (OUTPATIENT)
Dept: CARE COORDINATION | Age: 84
End: 2019-01-18

## 2019-01-22 ENCOUNTER — CARE COORDINATION (OUTPATIENT)
Dept: CARE COORDINATION | Age: 84
End: 2019-01-22

## 2019-01-22 RX ORDER — OMEPRAZOLE 20 MG/1
20 CAPSULE, DELAYED RELEASE ORAL DAILY
Qty: 30 CAPSULE | Refills: 0 | Status: SHIPPED | OUTPATIENT
Start: 2019-01-22 | End: 2020-09-09

## 2019-01-22 RX ORDER — SPIRONOLACTONE 25 MG/1
25 TABLET ORAL DAILY
COMMUNITY
End: 2019-04-08 | Stop reason: ALTCHOICE

## 2019-01-22 RX ORDER — TAMSULOSIN HYDROCHLORIDE 0.4 MG/1
0.4 CAPSULE ORAL DAILY
Qty: 30 CAPSULE | Refills: 3 | Status: SHIPPED | OUTPATIENT
Start: 2019-01-22 | End: 2019-02-05 | Stop reason: ALTCHOICE

## 2019-01-22 RX ORDER — OMEPRAZOLE 20 MG/1
20 CAPSULE, DELAYED RELEASE ORAL DAILY
COMMUNITY
End: 2019-01-22 | Stop reason: SDUPTHER

## 2019-01-24 ENCOUNTER — OFFICE VISIT (OUTPATIENT)
Dept: FAMILY MEDICINE CLINIC | Age: 84
End: 2019-01-24
Payer: MEDICARE

## 2019-01-24 VITALS
BODY MASS INDEX: 23.99 KG/M2 | RESPIRATION RATE: 16 BRPM | HEIGHT: 69 IN | TEMPERATURE: 97.9 F | SYSTOLIC BLOOD PRESSURE: 108 MMHG | WEIGHT: 162 LBS | HEART RATE: 63 BPM | DIASTOLIC BLOOD PRESSURE: 68 MMHG

## 2019-01-24 DIAGNOSIS — N18.30 CHRONIC KIDNEY DISEASE, STAGE III (MODERATE) (HCC): ICD-10-CM

## 2019-01-24 DIAGNOSIS — I48.19 PERSISTENT ATRIAL FIBRILLATION (HCC): ICD-10-CM

## 2019-01-24 DIAGNOSIS — I50.9 CONGESTIVE HEART FAILURE, UNSPECIFIED HF CHRONICITY, UNSPECIFIED HEART FAILURE TYPE (HCC): ICD-10-CM

## 2019-01-24 DIAGNOSIS — R31.0 GROSS HEMATURIA: ICD-10-CM

## 2019-01-24 DIAGNOSIS — Z09 HOSPITAL DISCHARGE FOLLOW-UP: ICD-10-CM

## 2019-01-24 DIAGNOSIS — I48.19 PERSISTENT ATRIAL FIBRILLATION (HCC): Primary | ICD-10-CM

## 2019-01-24 DIAGNOSIS — E11.9 TYPE 2 DIABETES MELLITUS WITHOUT COMPLICATION, WITHOUT LONG-TERM CURRENT USE OF INSULIN (HCC): ICD-10-CM

## 2019-01-24 LAB
ANION GAP SERPL CALCULATED.3IONS-SCNC: 10 MEQ/L (ref 7–13)
BASOPHILS ABSOLUTE: 0 K/UL (ref 0–0.2)
BASOPHILS RELATIVE PERCENT: 0.4 %
BUN BLDV-MCNC: 30 MG/DL (ref 8–23)
CALCIUM SERPL-MCNC: 9.2 MG/DL (ref 8.6–10.2)
CHLORIDE BLD-SCNC: 94 MEQ/L (ref 98–107)
CO2: 25 MEQ/L (ref 22–29)
CREAT SERPL-MCNC: 2.06 MG/DL (ref 0.7–1.2)
EOSINOPHILS ABSOLUTE: 0.1 K/UL (ref 0–0.7)
EOSINOPHILS RELATIVE PERCENT: 1.5 %
GFR AFRICAN AMERICAN: 37.4
GFR NON-AFRICAN AMERICAN: 30.9
GLUCOSE BLD-MCNC: 98 MG/DL (ref 74–109)
HCT VFR BLD CALC: 34.2 % (ref 42–52)
HEMOGLOBIN: 12 G/DL (ref 14–18)
LYMPHOCYTES ABSOLUTE: 0.9 K/UL (ref 1–4.8)
LYMPHOCYTES RELATIVE PERCENT: 14.7 %
MCH RBC QN AUTO: 33.3 PG (ref 27–31.3)
MCHC RBC AUTO-ENTMCNC: 35.1 % (ref 33–37)
MCV RBC AUTO: 95 FL (ref 80–100)
MONOCYTES ABSOLUTE: 0.5 K/UL (ref 0.2–0.8)
MONOCYTES RELATIVE PERCENT: 8.5 %
NEUTROPHILS ABSOLUTE: 4.7 K/UL (ref 1.4–6.5)
NEUTROPHILS RELATIVE PERCENT: 74.9 %
PDW BLD-RTO: 13.5 % (ref 11.5–14.5)
PLATELET # BLD: 213 K/UL (ref 130–400)
POTASSIUM SERPL-SCNC: 6.2 MEQ/L (ref 3.5–5.1)
RBC # BLD: 3.6 M/UL (ref 4.7–6.1)
SODIUM BLD-SCNC: 129 MEQ/L (ref 132–144)
WBC # BLD: 6.2 K/UL (ref 4.8–10.8)

## 2019-01-24 PROCEDURE — 99495 TRANSJ CARE MGMT MOD F2F 14D: CPT | Performed by: FAMILY MEDICINE

## 2019-01-24 ASSESSMENT — ENCOUNTER SYMPTOMS
COUGH: 0
CONSTIPATION: 0
EYE DISCHARGE: 0
SINUS PRESSURE: 0
DIARRHEA: 0
SHORTNESS OF BREATH: 0
EYE ITCHING: 0
ABDOMINAL PAIN: 0
SORE THROAT: 0

## 2019-02-01 ENCOUNTER — CARE COORDINATION (OUTPATIENT)
Dept: CARE COORDINATION | Age: 84
End: 2019-02-01

## 2019-02-04 ENCOUNTER — TELEPHONE (OUTPATIENT)
Dept: FAMILY MEDICINE CLINIC | Age: 84
End: 2019-02-04

## 2019-02-05 ENCOUNTER — OFFICE VISIT (OUTPATIENT)
Dept: FAMILY MEDICINE CLINIC | Age: 84
End: 2019-02-05
Payer: MEDICARE

## 2019-02-05 VITALS
DIASTOLIC BLOOD PRESSURE: 64 MMHG | BODY MASS INDEX: 23.13 KG/M2 | TEMPERATURE: 96.8 F | OXYGEN SATURATION: 94 % | HEIGHT: 69 IN | HEART RATE: 87 BPM | SYSTOLIC BLOOD PRESSURE: 108 MMHG | RESPIRATION RATE: 12 BRPM | WEIGHT: 156.2 LBS

## 2019-02-05 DIAGNOSIS — R31.9 HEMATURIA, UNSPECIFIED TYPE: Primary | ICD-10-CM

## 2019-02-05 DIAGNOSIS — E87.5 HYPERKALEMIA: ICD-10-CM

## 2019-02-05 DIAGNOSIS — R31.9 HEMATURIA, UNSPECIFIED TYPE: ICD-10-CM

## 2019-02-05 LAB
ANION GAP SERPL CALCULATED.3IONS-SCNC: 15 MEQ/L (ref 7–13)
BILIRUBIN, POC: NORMAL
BLOOD URINE, POC: NORMAL
BUN BLDV-MCNC: 26 MG/DL (ref 8–23)
CALCIUM SERPL-MCNC: 9.3 MG/DL (ref 8.6–10.2)
CHLORIDE BLD-SCNC: 104 MEQ/L (ref 98–107)
CLARITY, POC: NORMAL
CO2: 23 MEQ/L (ref 22–29)
COLOR, POC: NORMAL
CREAT SERPL-MCNC: 1.58 MG/DL (ref 0.7–1.2)
GFR AFRICAN AMERICAN: 50.7
GFR NON-AFRICAN AMERICAN: 41.9
GLUCOSE BLD-MCNC: 109 MG/DL (ref 74–109)
GLUCOSE URINE, POC: NORMAL
KETONES, POC: NORMAL
LEUKOCYTE EST, POC: NORMAL
NITRITE, POC: NORMAL
PH, POC: 6
POTASSIUM SERPL-SCNC: 5.2 MEQ/L (ref 3.5–5.1)
PROTEIN, POC: NORMAL
SODIUM BLD-SCNC: 142 MEQ/L (ref 132–144)
SPECIFIC GRAVITY, POC: 1.01
UROBILINOGEN, POC: NORMAL

## 2019-02-05 PROCEDURE — 81003 URINALYSIS AUTO W/O SCOPE: CPT | Performed by: FAMILY MEDICINE

## 2019-02-05 PROCEDURE — 99214 OFFICE O/P EST MOD 30 MIN: CPT | Performed by: FAMILY MEDICINE

## 2019-02-05 RX ORDER — FINASTERIDE 5 MG/1
5 TABLET, FILM COATED ORAL DAILY
COMMUNITY
Start: 2019-02-02 | End: 2019-06-24

## 2019-02-05 RX ORDER — DOCUSATE SODIUM 100 MG/1
100 CAPSULE, LIQUID FILLED ORAL DAILY
COMMUNITY
End: 2020-09-09

## 2019-02-06 DIAGNOSIS — E87.5 HYPERKALEMIA: Primary | ICD-10-CM

## 2019-02-06 DIAGNOSIS — R31.9 HEMATURIA, UNSPECIFIED TYPE: ICD-10-CM

## 2019-02-06 ASSESSMENT — ENCOUNTER SYMPTOMS
SINUS PRESSURE: 0
EYE ITCHING: 0
DIARRHEA: 0
ABDOMINAL PAIN: 0
EYE DISCHARGE: 0
COUGH: 0
SORE THROAT: 0
SHORTNESS OF BREATH: 0
CONSTIPATION: 0

## 2019-02-19 ENCOUNTER — CARE COORDINATION (OUTPATIENT)
Dept: CARE COORDINATION | Age: 84
End: 2019-02-19

## 2019-02-28 ENCOUNTER — TELEPHONE (OUTPATIENT)
Dept: PHARMACY | Facility: CLINIC | Age: 84
End: 2019-02-28

## 2019-03-05 LAB — LACTIC ACID: 0.7 MMOL/L (ref 0.4–2)

## 2019-03-07 LAB
ANION GAP SERPL CALCULATED.3IONS-SCNC: 10 MMOL/L (ref 10–20)
BICARBONATE: 25 MMOL/L (ref 21–32)
BUN / CREAT RATIO: 19 (ref 5–25)
CALCIUM SERPL-MCNC: 8.5 MG/DL (ref 8.6–10.3)
CHLORIDE BLD-SCNC: 106 MMOL/L (ref 98–107)
CREAT SERPL-MCNC: 1.15 MG/DL (ref 0.5–1.3)
FOLATE: 13.3 NG/ML
GFR CALCULATED: >60
GLUCOSE: 108 MG/DL (ref 70–100)
IRON SATURATION: 7 % (ref 14–27)
IRON: 13 UG/DL (ref 35–150)
MAGNESIUM: 2.2 MG/DL (ref 1.6–2.4)
POTASSIUM SERPL-SCNC: 4.2 MMOL/L (ref 3.5–5.1)
SODIUM BLD-SCNC: 137 MMOL/L (ref 136–145)
TOTAL IRON BINDING CAPACITY: 189 UG/DL (ref 250–565)
UNSATURATED IRON BINDING CAPACITY: 176 UG/DL (ref 90–340)
UREA NITROGEN: 22 MG/DL (ref 6–23)
VITAMIN B-12: 191 PG/ML (ref 211–911)

## 2019-03-08 LAB
ANION GAP SERPL CALCULATED.3IONS-SCNC: 11 MMOL/L (ref 10–20)
BICARBONATE: 24 MMOL/L (ref 21–32)
BUN / CREAT RATIO: 21 (ref 5–25)
CALCIUM SERPL-MCNC: 8.5 MG/DL (ref 8.6–10.3)
CHLORIDE BLD-SCNC: 105 MMOL/L (ref 98–107)
CREAT SERPL-MCNC: 1.21 MG/DL (ref 0.5–1.3)
ERYTHROCYTE [DISTWIDTH] IN BLOOD BY AUTOMATED COUNT: 13 % (ref 12–15.4)
ERYTHROCYTE [DISTWIDTH] IN BLOOD BY AUTOMATED COUNT: 45.6 FL (ref 39.3–48.6)
GFR CALCULATED: 57
GLUCOSE: 124 MG/DL (ref 70–100)
HCT VFR BLD CALC: 27.5 % (ref 38.4–54.9)
HEMOGLOBIN: 9.3 G/DL (ref 12.8–17.7)
MCH RBC QN AUTO: 32.2 PG (ref 27.5–32.9)
MCHC RBC AUTO-ENTMCNC: 33.8 G/DL (ref 30.5–35.4)
MCV RBC AUTO: 95.2 FL (ref 83.3–98.2)
NUCLEATED RBCS: 0 /100{WBCS}
PLATELET # BLD: 183 10*3/UL (ref 155–404)
PMV BLD AUTO: 10.6 FL (ref 9.9–12.1)
POTASSIUM SERPL-SCNC: 4.4 MMOL/L (ref 3.5–5.1)
RBC: 2.89 10*6/UL (ref 4.08–6.37)
RBCS COUNTED: 0 10*3/UL
SODIUM BLD-SCNC: 136 MMOL/L (ref 136–145)
UREA NITROGEN: 25 MG/DL (ref 6–23)
WBC: 5.8 10*3/UL (ref 4.2–11)

## 2019-03-11 ENCOUNTER — TELEPHONE (OUTPATIENT)
Dept: CARE COORDINATION | Age: 84
End: 2019-03-11

## 2019-03-11 ENCOUNTER — CARE COORDINATION (OUTPATIENT)
Dept: CARE COORDINATION | Age: 84
End: 2019-03-11

## 2019-03-12 ENCOUNTER — TELEPHONE (OUTPATIENT)
Dept: ADMINISTRATIVE | Age: 84
End: 2019-03-12

## 2019-03-21 ENCOUNTER — OFFICE VISIT (OUTPATIENT)
Dept: FAMILY MEDICINE CLINIC | Age: 84
End: 2019-03-21
Payer: MEDICARE

## 2019-03-21 VITALS
TEMPERATURE: 96.9 F | OXYGEN SATURATION: 98 % | HEIGHT: 69 IN | BODY MASS INDEX: 22.35 KG/M2 | HEART RATE: 79 BPM | WEIGHT: 150.9 LBS | RESPIRATION RATE: 16 BRPM | DIASTOLIC BLOOD PRESSURE: 60 MMHG | SYSTOLIC BLOOD PRESSURE: 118 MMHG

## 2019-03-21 DIAGNOSIS — H81.10 BENIGN PAROXYSMAL POSITIONAL VERTIGO, UNSPECIFIED LATERALITY: ICD-10-CM

## 2019-03-21 DIAGNOSIS — K59.01 SLOW TRANSIT CONSTIPATION: Primary | ICD-10-CM

## 2019-03-21 PROCEDURE — G8510 SCR DEP NEG, NO PLAN REQD: HCPCS | Performed by: NURSE PRACTITIONER

## 2019-03-21 PROCEDURE — 99213 OFFICE O/P EST LOW 20 MIN: CPT | Performed by: NURSE PRACTITIONER

## 2019-03-21 RX ORDER — SIMVASTATIN 20 MG
20 TABLET ORAL NIGHTLY
Qty: 90 TABLET | Refills: 0 | Status: SHIPPED | OUTPATIENT
Start: 2019-03-21 | End: 2021-05-10

## 2019-03-21 RX ORDER — TAMSULOSIN HYDROCHLORIDE 0.4 MG/1
0.4 CAPSULE ORAL DAILY
COMMUNITY

## 2019-03-21 RX ORDER — METOPROLOL TARTRATE 50 MG/1
TABLET, FILM COATED ORAL
COMMUNITY
Start: 2019-03-08 | End: 2019-04-08 | Stop reason: ALTCHOICE

## 2019-03-21 RX ORDER — DIGOXIN 125 MCG
125 TABLET ORAL DAILY
COMMUNITY
End: 2019-04-08 | Stop reason: ALTCHOICE

## 2019-03-21 ASSESSMENT — PATIENT HEALTH QUESTIONNAIRE - PHQ9
2. FEELING DOWN, DEPRESSED OR HOPELESS: 0
1. LITTLE INTEREST OR PLEASURE IN DOING THINGS: 0
SUM OF ALL RESPONSES TO PHQ QUESTIONS 1-9: 0
SUM OF ALL RESPONSES TO PHQ9 QUESTIONS 1 & 2: 0
SUM OF ALL RESPONSES TO PHQ QUESTIONS 1-9: 0

## 2019-03-21 ASSESSMENT — ENCOUNTER SYMPTOMS
ABDOMINAL PAIN: 1
RESPIRATORY NEGATIVE: 1
CONSTIPATION: 1

## 2019-03-25 ENCOUNTER — TELEPHONE (OUTPATIENT)
Dept: PHARMACY | Facility: CLINIC | Age: 84
End: 2019-03-25

## 2019-03-27 ENCOUNTER — CARE COORDINATION (OUTPATIENT)
Dept: CARE COORDINATION | Age: 84
End: 2019-03-27

## 2019-03-29 ENCOUNTER — OFFICE VISIT (OUTPATIENT)
Dept: FAMILY MEDICINE CLINIC | Age: 84
End: 2019-03-29
Payer: MEDICARE

## 2019-03-29 VITALS
HEIGHT: 69 IN | WEIGHT: 155 LBS | DIASTOLIC BLOOD PRESSURE: 62 MMHG | BODY MASS INDEX: 22.96 KG/M2 | RESPIRATION RATE: 20 BRPM | SYSTOLIC BLOOD PRESSURE: 88 MMHG | TEMPERATURE: 97.6 F | HEART RATE: 83 BPM

## 2019-03-29 DIAGNOSIS — R31.9 HEMATURIA, UNSPECIFIED TYPE: Primary | ICD-10-CM

## 2019-03-29 DIAGNOSIS — R11.0 NAUSEA: ICD-10-CM

## 2019-03-29 DIAGNOSIS — R42 DIZZINESS: ICD-10-CM

## 2019-03-29 DIAGNOSIS — R10.30 LOWER ABDOMINAL PAIN: ICD-10-CM

## 2019-03-29 DIAGNOSIS — R53.83 OTHER FATIGUE: ICD-10-CM

## 2019-03-29 DIAGNOSIS — E11.9 TYPE 2 DIABETES MELLITUS WITHOUT COMPLICATION, WITHOUT LONG-TERM CURRENT USE OF INSULIN (HCC): ICD-10-CM

## 2019-03-29 LAB
BILIRUBIN, POC: NORMAL
BLOOD URINE, POC: 10
CLARITY, POC: CLEAR
COLOR, POC: NORMAL
GLUCOSE URINE, POC: NORMAL
KETONES, POC: NORMAL
LEUKOCYTE EST, POC: 500
NITRITE, POC: NORMAL
PH, POC: 6
PROTEIN, POC: 0.3
SPECIFIC GRAVITY, POC: 1.01
UROBILINOGEN, POC: 3.5

## 2019-03-29 PROCEDURE — 81002 URINALYSIS NONAUTO W/O SCOPE: CPT | Performed by: FAMILY MEDICINE

## 2019-03-29 PROCEDURE — 96372 THER/PROPH/DIAG INJ SC/IM: CPT | Performed by: FAMILY MEDICINE

## 2019-03-29 PROCEDURE — 99214 OFFICE O/P EST MOD 30 MIN: CPT | Performed by: FAMILY MEDICINE

## 2019-03-29 RX ORDER — CIPROFLOXACIN 500 MG/1
500 TABLET, FILM COATED ORAL 2 TIMES DAILY
Qty: 20 TABLET | Refills: 0 | Status: SHIPPED | OUTPATIENT
Start: 2019-03-29 | End: 2019-04-08

## 2019-03-29 RX ORDER — CYANOCOBALAMIN 1000 UG/ML
1000 INJECTION INTRAMUSCULAR; SUBCUTANEOUS ONCE
Status: COMPLETED | OUTPATIENT
Start: 2019-03-29 | End: 2019-03-29

## 2019-03-29 RX ADMIN — CYANOCOBALAMIN 1000 MCG: 1000 INJECTION INTRAMUSCULAR; SUBCUTANEOUS at 17:07

## 2019-03-29 ASSESSMENT — ENCOUNTER SYMPTOMS
DIARRHEA: 0
SINUS PRESSURE: 0
COUGH: 0
EYE ITCHING: 0
SORE THROAT: 0
CONSTIPATION: 0
SHORTNESS OF BREATH: 0
EYE DISCHARGE: 0
ABDOMINAL PAIN: 0

## 2019-04-01 ENCOUNTER — TELEPHONE (OUTPATIENT)
Dept: FAMILY MEDICINE CLINIC | Age: 84
End: 2019-04-01

## 2019-04-01 LAB
ORGANISM: ABNORMAL
ORGANISM: ABNORMAL
URINE CULTURE, ROUTINE: ABNORMAL

## 2019-04-01 RX ORDER — CEFTRIAXONE 1 G/1
1 INJECTION, POWDER, FOR SOLUTION INTRAMUSCULAR; INTRAVENOUS ONCE
Qty: 1 G | Refills: 0
Start: 2019-04-01 | End: 2019-04-01

## 2019-04-01 NOTE — TELEPHONE ENCOUNTER
Please let patient know that her urine culture was positive for a resistant type of infection. The antibiotic given will more than likely not be strong enough to completely clear  The infection. She needs to come in for a Rocephin injection and then we will need to recheck her urine once she has finished all the antibiotics.

## 2019-04-03 ENCOUNTER — NURSE ONLY (OUTPATIENT)
Dept: FAMILY MEDICINE CLINIC | Age: 84
End: 2019-04-03
Payer: MEDICARE

## 2019-04-03 DIAGNOSIS — N39.0 URINARY TRACT INFECTION WITH HEMATURIA, SITE UNSPECIFIED: Primary | ICD-10-CM

## 2019-04-03 DIAGNOSIS — R31.9 URINARY TRACT INFECTION WITH HEMATURIA, SITE UNSPECIFIED: Primary | ICD-10-CM

## 2019-04-03 PROCEDURE — 96372 THER/PROPH/DIAG INJ SC/IM: CPT | Performed by: NURSE PRACTITIONER

## 2019-04-03 RX ORDER — CEFTRIAXONE 1 G/1
1 INJECTION, POWDER, FOR SOLUTION INTRAMUSCULAR; INTRAVENOUS ONCE
Status: COMPLETED | OUTPATIENT
Start: 2019-04-03 | End: 2019-04-03

## 2019-04-03 RX ADMIN — CEFTRIAXONE 1 G: 1 INJECTION, POWDER, FOR SOLUTION INTRAMUSCULAR; INTRAVENOUS at 10:30

## 2019-04-03 NOTE — PROGRESS NOTES
After obtaining consent, and per orders of PIETER Sanches, injection of Rocephin 1 g given in Right upper quad. gluteus by Alek Coates. Patient instructed to remain in clinic for 20 minutes afterwards, and to report any adverse reaction to me immediately.

## 2019-04-08 ENCOUNTER — OFFICE VISIT (OUTPATIENT)
Dept: CARDIOLOGY CLINIC | Age: 84
End: 2019-04-08
Payer: MEDICARE

## 2019-04-08 VITALS
BODY MASS INDEX: 22.84 KG/M2 | HEART RATE: 95 BPM | HEIGHT: 69 IN | OXYGEN SATURATION: 98 % | SYSTOLIC BLOOD PRESSURE: 110 MMHG | WEIGHT: 154.2 LBS | DIASTOLIC BLOOD PRESSURE: 70 MMHG

## 2019-04-08 DIAGNOSIS — K55.9 MESENTERIC ISCHEMIA (HCC): ICD-10-CM

## 2019-04-08 DIAGNOSIS — Z95.810 ICD (IMPLANTABLE CARDIOVERTER-DEFIBRILLATOR) IN PLACE: ICD-10-CM

## 2019-04-08 DIAGNOSIS — E78.5 HYPERLIPIDEMIA, UNSPECIFIED HYPERLIPIDEMIA TYPE: ICD-10-CM

## 2019-04-08 DIAGNOSIS — I42.9 CARDIOMYOPATHY, UNSPECIFIED TYPE (HCC): ICD-10-CM

## 2019-04-08 DIAGNOSIS — I44.7 LEFT BUNDLE BRANCH BLOCK: ICD-10-CM

## 2019-04-08 DIAGNOSIS — I10 ESSENTIAL HYPERTENSION: ICD-10-CM

## 2019-04-08 DIAGNOSIS — I25.84 CORONARY ATHEROSCLEROSIS DUE TO CALCIFIED CORONARY LESION (CODE): Primary | ICD-10-CM

## 2019-04-08 DIAGNOSIS — I48.19 PERSISTENT ATRIAL FIBRILLATION (HCC): ICD-10-CM

## 2019-04-08 PROCEDURE — 99213 OFFICE O/P EST LOW 20 MIN: CPT | Performed by: INTERNAL MEDICINE

## 2019-04-08 RX ORDER — SPIRONOLACTONE 25 MG/1
25 TABLET ORAL DAILY
Qty: 30 TABLET | Refills: 5 | Status: SHIPPED
Start: 2019-04-08 | End: 2020-02-27 | Stop reason: DRUGHIGH

## 2019-04-08 NOTE — PROGRESS NOTES
Occupational History    Not on file   Social Needs    Financial resource strain: Not on file    Food insecurity:     Worry: Not on file     Inability: Not on file    Transportation needs:     Medical: Not on file     Non-medical: Not on file   Tobacco Use    Smoking status: Never Smoker    Smokeless tobacco: Never Used   Substance and Sexual Activity    Alcohol use: Yes     Comment: Rarely beer    Drug use: No    Sexual activity: Not on file   Lifestyle    Physical activity:     Days per week: Not on file     Minutes per session: Not on file    Stress: Not on file   Relationships    Social connections:     Talks on phone: Not on file     Gets together: Not on file     Attends Islam service: Not on file     Active member of club or organization: Not on file     Attends meetings of clubs or organizations: Not on file     Relationship status: Not on file    Intimate partner violence:     Fear of current or ex partner: Not on file     Emotionally abused: Not on file     Physically abused: Not on file     Forced sexual activity: Not on file   Other Topics Concern    Not on file   Social History Narrative    Not on file       Allergies   Allergen Reactions    Morphine      Other reaction(s): Delirium       Current Outpatient Medications   Medication Sig Dispense Refill    metoprolol tartrate (LOPRESSOR) 25 MG tablet Take 0.5 tablets by mouth 2 times daily 60 tablet 3    spironolactone (ALDACTONE) 25 MG tablet Take 1 tablet by mouth daily 30 tablet 5    ciprofloxacin (CIPRO) 500 MG tablet Take 1 tablet by mouth 2 times daily for 10 days 20 tablet 0    FERROUS SULFATE PO Take by mouth      tamsulosin (FLOMAX) 0.4 MG capsule Take 0.4 mg by mouth daily      simvastatin (ZOCOR) 20 MG tablet Take 1 tablet by mouth nightly 90 tablet 0    docusate sodium (COLACE) 100 MG capsule Take 100 mg by mouth 2 times daily      finasteride (PROSCAR) 5 MG tablet Take 5 mg by mouth daily      Multiple Vitamin (ONE-A-DAY ESSENTIAL PO) Take 1 tablet by mouth daily      Multiple Vitamins-Minerals (EYE VITAMINS) CAPS Take 1 capsule by mouth daily      omeprazole (PRILOSEC) 20 MG delayed release capsule Take 1 capsule by mouth daily 30 capsule 0    ondansetron (ZOFRAN) 4 MG tablet TAKE 1 TO 2 TABLETS EVERY 4 TO 6 HOURS AS NEEDED FOR NAUSEA AND VOMITING 30 tablet 1    latanoprost (XALATAN) 0.005 % ophthalmic solution Place 1 drop into both eyes nightly      aspirin 81 MG EC tablet Take 1 tablet by mouth daily 30 tablet 3    Psyllium (METAMUCIL) 48.57 % POWD Take by mouth daily as needed       polyethylene glycol (GLYCOLAX) powder Take 17 g by mouth daily as needed        No current facility-administered medications for this visit. Review of Systems:   Review of Systems   Constitutional: Negative for activity change and appetite change. HENT: Negative for congestion. Respiratory: Negative for apnea, choking and chest tightness. Cardiovascular: Negative for chest pain. Gastrointestinal: Negative for abdominal distention and abdominal pain. Endocrine: Negative for cold intolerance and heat intolerance. Genitourinary: Negative for dysuria and enuresis. Musculoskeletal: Negative for arthralgias and back pain. Skin: Negative for color change. Allergic/Immunologic: Negative. Neurological: Negative for dizziness, seizures, syncope and light-headedness. Psychiatric/Behavioral: Negative for agitation, behavioral problems and confusion. Physical Examination:    /70 (Site: Left Upper Arm, Position: Sitting, Cuff Size: Small Adult)   Pulse 95   Ht 5' 9\" (1.753 m)   Wt 154 lb 3.2 oz (69.9 kg)   SpO2 98%   BMI 22.77 kg/m²    Physical Exam   Constitutional: The patient appears healthy. No distress. HENT: Mouth/Throat: Oropharynx is clear. Eyes: Pupils are equal, round, and reactive to light. Neck: Normal range of motion. No JVD present.    Cardiovascular: Regular rhythm, S1 normal, S2 normal, normal heart sounds and intact distal pulses. Exam reveals no gallop. No murmur heard. Pulses:       Radial pulses are 2+ on the right side. Dorsalis pedis pulses are 2+ on the right side. Pulmonary/Chest: Effort normal and breath sounds normal. No wheezes. No rales. No tenderness. Abdominal: Soft. Bowel sounds are normal.   Musculoskeletal: Normal range of motion. No edema. Neurological: The patient is alert and oriented to person, place, and time. Intact cranial nerves. Skin: Skin is warm and dry. No rash noted.        LABS:  CBC:   Lab Results   Component Value Date    WBC 5.8 03/08/2019    WBC 6.2 01/24/2019    RBC 2.89 03/08/2019    HGB 9.3 03/08/2019    HCT 27.5 03/08/2019    MCV 95.2 03/08/2019    MCH 32.2 03/08/2019    MCHC 33.8 03/08/2019    RDW 13.5 01/24/2019     03/08/2019    MPV 10.6 03/08/2019     Lipids:  Lab Results   Component Value Date    CHOL 164 01/02/2019    CHOL 143 06/29/2018    CHOL 136 04/19/2018     Lab Results   Component Value Date    TRIG 337 (H) 01/02/2019    TRIG 160 06/29/2018    TRIG 326 (H) 04/19/2018     Lab Results   Component Value Date    HDL 34 (L) 01/02/2019    HDL 32 (L) 06/29/2018    HDL 28 (L) 04/19/2018     Lab Results   Component Value Date    LDLCALC 63 01/02/2019    LDLCALC 79 06/29/2018    LDLCALC 43 04/19/2018     Lab Results   Component Value Date    VLDL 16 09/19/2017     Lab Results   Component Value Date    CHOLHDLRATIO 2.9 09/19/2017    CHOLHDLRATIO 4.7 06/21/2011     CMP:    Lab Results   Component Value Date     03/08/2019    K 4.4 03/08/2019    K 4.3 01/25/2018     03/08/2019    CO2 23 02/05/2019    BUN 26 02/05/2019    CREATININE 1.21 03/08/2019    GFRAA 50.7 02/05/2019    AGRATIO 1.3 03/05/2019    LABGLOM 57 03/08/2019    LABGLOM 41.9 02/05/2019    GLUCOSE 124 03/08/2019    PROT 5.4 03/05/2019    LABALBU 3.1 03/05/2019    CALCIUM 8.5 03/08/2019    BILITOT 0.5 03/05/2019    ALKPHOS 58 03/05/2019    AST 15 03/05/2019    ALT 8 03/05/2019     BMP:    Lab Results   Component Value Date     03/08/2019    K 4.4 03/08/2019    K 4.3 01/25/2018     03/08/2019    CO2 23 02/05/2019    BUN 26 02/05/2019    LABALBU 3.1 03/05/2019    CREATININE 1.21 03/08/2019    CALCIUM 8.5 03/08/2019    GFRAA 50.7 02/05/2019    LABGLOM 57 03/08/2019    LABGLOM 41.9 02/05/2019    GLUCOSE 124 03/08/2019     Magnesium:    Lab Results   Component Value Date    MG 2.2 03/07/2019     TSH:No results found for: TSHFT4, TSH    Patient Active Problem List   Diagnosis    Hypertension    Benign prostatic hyperplasia    GERD (gastroesophageal reflux disease)    Osteoarthritis    Type 2 diabetes mellitus without complication (HCC)    Hyperlipidemia    Bladder cancer (HCC)    Sinus bradycardia on ECG    Left bundle branch block    Abnormal finding on EKG    Primary bladder malignant neoplasm (HCC)    Hypogonadism in male    Atrial fibrillation (HCC)    Abdominal pain    Mesenteric ischemia (HCC)    Epigastric abdominal pain    Nausea    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    Urothelial carcinoma (HCC)    Chronic kidney disease, stage III (moderate) (HCC)    Congestive heart failure (HCC)       Medications:  Current Outpatient Medications   Medication Sig Dispense Refill    metoprolol tartrate (LOPRESSOR) 25 MG tablet Take 0.5 tablets by mouth 2 times daily 60 tablet 3    spironolactone (ALDACTONE) 25 MG tablet Take 1 tablet by mouth daily 30 tablet 5    ciprofloxacin (CIPRO) 500 MG tablet Take 1 tablet by mouth 2 times daily for 10 days 20 tablet 0    FERROUS SULFATE PO Take by mouth      tamsulosin (FLOMAX) 0.4 MG capsule Take 0.4 mg by mouth daily      simvastatin (ZOCOR) 20 MG tablet Take 1 tablet by mouth nightly 90 tablet 0    docusate sodium (COLACE) 100 MG capsule Take 100 mg by mouth 2 times daily      finasteride (PROSCAR) 5 MG tablet Take 5 mg by mouth daily      Multiple Vitamin (ONE-A-DAY ESSENTIAL PO) Take 1 tablet by mouth daily      Multiple Vitamins-Minerals (EYE VITAMINS) CAPS Take 1 capsule by mouth daily      omeprazole (PRILOSEC) 20 MG delayed release capsule Take 1 capsule by mouth daily 30 capsule 0    ondansetron (ZOFRAN) 4 MG tablet TAKE 1 TO 2 TABLETS EVERY 4 TO 6 HOURS AS NEEDED FOR NAUSEA AND VOMITING 30 tablet 1    latanoprost (XALATAN) 0.005 % ophthalmic solution Place 1 drop into both eyes nightly      aspirin 81 MG EC tablet Take 1 tablet by mouth daily 30 tablet 3    Psyllium (METAMUCIL) 48.57 % POWD Take by mouth daily as needed       polyethylene glycol (GLYCOLAX) powder Take 17 g by mouth daily as needed        No current facility-administered medications for this visit. Assessment/Plan:    1. Coronary atherosclerosis due to calcified coronary lesion (CODE)       2. Cardiomyopathy, unspecified type ([address]      3. Left bundle branch block      4. Persistent atrial fibrillation ([address]      5. Mesenteric ischemia ([address]      6. Essential hypertension      7. Hyperlipidemia, unspecified hyperlipidemia type      8. ICD (implantable cardioverter-defibrillator) in place       Start metoprolol and see back in 2 weeks. Counseling: the patient was counseled regarding diet, exercise, weight control and heart healthy lifestyle. Return in about 2 weeks (around 4/22/2019) for followup cv disease. Electronically signed by   Sydney Leung.  Chucky Medeiros MD [facility] Director of Cardiology Services and Cardiac Catheterization Laboratory  [facility], [place]    on 4/8/2019 at 1:42 PM

## 2019-04-25 ENCOUNTER — OFFICE VISIT (OUTPATIENT)
Dept: CARDIOLOGY CLINIC | Age: 84
End: 2019-04-25
Payer: MEDICARE

## 2019-04-25 VITALS
OXYGEN SATURATION: 98 % | HEART RATE: 73 BPM | DIASTOLIC BLOOD PRESSURE: 60 MMHG | BODY MASS INDEX: 22.84 KG/M2 | HEIGHT: 69 IN | SYSTOLIC BLOOD PRESSURE: 110 MMHG | WEIGHT: 154.2 LBS

## 2019-04-25 DIAGNOSIS — I48.19 PERSISTENT ATRIAL FIBRILLATION (HCC): ICD-10-CM

## 2019-04-25 DIAGNOSIS — Z95.810 ICD (IMPLANTABLE CARDIOVERTER-DEFIBRILLATOR) IN PLACE: ICD-10-CM

## 2019-04-25 DIAGNOSIS — I44.7 LEFT BUNDLE BRANCH BLOCK: ICD-10-CM

## 2019-04-25 DIAGNOSIS — E78.5 HYPERLIPIDEMIA, UNSPECIFIED HYPERLIPIDEMIA TYPE: ICD-10-CM

## 2019-04-25 DIAGNOSIS — I25.84 CORONARY ATHEROSCLEROSIS DUE TO CALCIFIED CORONARY LESION (CODE): Primary | ICD-10-CM

## 2019-04-25 DIAGNOSIS — I42.9 CARDIOMYOPATHY, UNSPECIFIED TYPE (HCC): ICD-10-CM

## 2019-04-25 DIAGNOSIS — I10 ESSENTIAL HYPERTENSION: ICD-10-CM

## 2019-04-25 DIAGNOSIS — K55.9 MESENTERIC ISCHEMIA (HCC): ICD-10-CM

## 2019-04-25 PROCEDURE — 99213 OFFICE O/P EST LOW 20 MIN: CPT | Performed by: INTERNAL MEDICINE

## 2019-04-25 NOTE — PROGRESS NOTES
tablet Take 5 mg by mouth daily      Multiple Vitamin (ONE-A-DAY ESSENTIAL PO) Take 1 tablet by mouth daily      Multiple Vitamins-Minerals (EYE VITAMINS) CAPS Take 1 capsule by mouth daily      omeprazole (PRILOSEC) 20 MG delayed release capsule Take 1 capsule by mouth daily 30 capsule 0    ondansetron (ZOFRAN) 4 MG tablet TAKE 1 TO 2 TABLETS EVERY 4 TO 6 HOURS AS NEEDED FOR NAUSEA AND VOMITING 30 tablet 1    latanoprost (XALATAN) 0.005 % ophthalmic solution Place 1 drop into both eyes nightly      aspirin 81 MG EC tablet Take 1 tablet by mouth daily 30 tablet 3    Psyllium (METAMUCIL) 48.57 % POWD Take by mouth daily as needed       polyethylene glycol (GLYCOLAX) powder Take 17 g by mouth daily as needed        No current facility-administered medications for this visit. Review of Systems:   Review of Systems   Constitutional: Negative for activity change and appetite change. HENT: Negative for congestion. Respiratory: Negative for apnea, choking and chest tightness. Cardiovascular: Negative for chest pain. Gastrointestinal: Negative for abdominal distention and abdominal pain. Endocrine: Negative for cold intolerance and heat intolerance. Genitourinary: Negative for dysuria and enuresis. Musculoskeletal: Negative for arthralgias and back pain. Skin: Negative for color change. Allergic/Immunologic: Negative. Neurological: Negative for dizziness, seizures, syncope and light-headedness. Psychiatric/Behavioral: Negative for agitation, behavioral problems and confusion. Physical Examination:    /60 (Site: Left Upper Arm, Position: Sitting, Cuff Size: Small Adult)   Pulse 73   Ht 5' 9\" (1.753 m)   Wt 154 lb 3.2 oz (69.9 kg)   SpO2 98%   BMI 22.77 kg/m²    Physical Exam   Constitutional: The patient appears healthy. No distress. HENT: Mouth/Throat: Oropharynx is clear. Eyes: Pupils are equal, round, and reactive to light. Neck: Normal range of motion.  No JVD present. Cardiovascular: Regular rhythm, S1 normal, S2 normal, normal heart sounds and intact distal pulses. Exam reveals no gallop. No murmur heard. Pulses:       Radial pulses are 2+ on the right side. Dorsalis pedis pulses are 2+ on the right side. Pulmonary/Chest: Effort normal and breath sounds normal. No wheezes. No rales. No tenderness. Abdominal: Soft. Bowel sounds are normal.   Musculoskeletal: Normal range of motion. No edema. Neurological: The patient is alert and oriented to person, place, and time. Intact cranial nerves. Skin: Skin is warm and dry. No rash noted.        LABS:  CBC:   Lab Results   Component Value Date    WBC 5.8 03/08/2019    WBC 6.2 01/24/2019    RBC 2.89 03/08/2019    HGB 9.3 03/08/2019    HCT 27.5 03/08/2019    MCV 95.2 03/08/2019    MCH 32.2 03/08/2019    MCHC 33.8 03/08/2019    RDW 13.5 01/24/2019     03/08/2019    MPV 10.6 03/08/2019     Lipids:  Lab Results   Component Value Date    CHOL 164 01/02/2019    CHOL 143 06/29/2018    CHOL 136 04/19/2018     Lab Results   Component Value Date    TRIG 337 (H) 01/02/2019    TRIG 160 06/29/2018    TRIG 326 (H) 04/19/2018     Lab Results   Component Value Date    HDL 34 (L) 01/02/2019    HDL 32 (L) 06/29/2018    HDL 28 (L) 04/19/2018     Lab Results   Component Value Date    LDLCALC 63 01/02/2019    LDLCALC 79 06/29/2018    LDLCALC 43 04/19/2018     Lab Results   Component Value Date    VLDL 16 09/19/2017     Lab Results   Component Value Date    CHOLHDLRATIO 2.9 09/19/2017    CHOLHDLRATIO 4.7 06/21/2011     CMP:    Lab Results   Component Value Date     03/08/2019    K 4.4 03/08/2019    K 4.3 01/25/2018     03/08/2019    CO2 23 02/05/2019    BUN 26 02/05/2019    CREATININE 1.21 03/08/2019    GFRAA 50.7 02/05/2019    AGRATIO 1.3 03/05/2019    LABGLOM 57 03/08/2019    LABGLOM 41.9 02/05/2019    GLUCOSE 124 03/08/2019    PROT 5.4 03/05/2019    LABALBU 3.1 03/05/2019    CALCIUM 8.5 03/08/2019    BILITOT 0.5 03/05/2019    ALKPHOS 58 03/05/2019    AST 15 03/05/2019    ALT 8 03/05/2019     BMP:    Lab Results   Component Value Date     03/08/2019    K 4.4 03/08/2019    K 4.3 01/25/2018     03/08/2019    CO2 23 02/05/2019    BUN 26 02/05/2019    LABALBU 3.1 03/05/2019    CREATININE 1.21 03/08/2019    CALCIUM 8.5 03/08/2019    GFRAA 50.7 02/05/2019    LABGLOM 57 03/08/2019    LABGLOM 41.9 02/05/2019    GLUCOSE 124 03/08/2019     Magnesium:    Lab Results   Component Value Date    MG 2.2 03/07/2019     TSH:No results found for: TSHFT4, TSH    Patient Active Problem List   Diagnosis    Hypertension    Benign prostatic hyperplasia    GERD (gastroesophageal reflux disease)    Osteoarthritis    Type 2 diabetes mellitus without complication (HCC)    Hyperlipidemia    Bladder cancer (HCC)    Sinus bradycardia on ECG    Left bundle branch block    Abnormal finding on EKG    Primary bladder malignant neoplasm (HCC)    Hypogonadism in male    Atrial fibrillation (HCC)    Abdominal pain    Mesenteric ischemia (HCC)    Epigastric abdominal pain    Nausea    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    Urothelial carcinoma (HCC)    Chronic kidney disease, stage III (moderate) (HCC)    Congestive heart failure (HCC)       Medications:  Current Outpatient Medications   Medication Sig Dispense Refill    metoprolol tartrate (LOPRESSOR) 25 MG tablet Take 0.5 tablets by mouth 2 times daily 60 tablet 3    spironolactone (ALDACTONE) 25 MG tablet Take 1 tablet by mouth daily 30 tablet 5    FERROUS SULFATE PO Take by mouth      tamsulosin (FLOMAX) 0.4 MG capsule Take 0.4 mg by mouth daily      simvastatin (ZOCOR) 20 MG tablet Take 1 tablet by mouth nightly 90 tablet 0    docusate sodium (COLACE) 100 MG capsule Take 100 mg by mouth 2 times daily      finasteride (PROSCAR) 5 MG tablet Take 5 mg by mouth daily      Multiple Vitamin (ONE-A-DAY ESSENTIAL PO) Take 1 tablet by mouth daily      Multiple Vitamins-Minerals (EYE VITAMINS) CAPS Take 1 capsule by mouth daily      omeprazole (PRILOSEC) 20 MG delayed release capsule Take 1 capsule by mouth daily 30 capsule 0    ondansetron (ZOFRAN) 4 MG tablet TAKE 1 TO 2 TABLETS EVERY 4 TO 6 HOURS AS NEEDED FOR NAUSEA AND VOMITING 30 tablet 1    latanoprost (XALATAN) 0.005 % ophthalmic solution Place 1 drop into both eyes nightly      aspirin 81 MG EC tablet Take 1 tablet by mouth daily 30 tablet 3    Psyllium (METAMUCIL) 48.57 % POWD Take by mouth daily as needed       polyethylene glycol (GLYCOLAX) powder Take 17 g by mouth daily as needed        No current facility-administered medications for this visit. Assessment/Plan:    1. Coronary atherosclerosis due to calcified coronary lesion (CODE)       2. Cardiomyopathy, unspecified type ([address]      3. Left bundle branch block      4. Persistent atrial fibrillation ([address]      5. Mesenteric ischemia ([address]      6. Essential hypertension      7. Hyperlipidemia, unspecified hyperlipidemia type      8. ICD (implantable cardioverter-defibrillator) in place    Stable no changes. Compensated. Counseling: the patient was counseled regarding diet, exercise, weight control and heart healthy lifestyle. Return in about 3 months (around 7/25/2019) for followup cv disease. Electronically signed by   Severo Dyer.  Unique Covarrubias MD [facility] Director of Cardiology Services and Cardiac Catheterization Laboratory  [facility], [place]    on 4/25/2019 at 1:42 PM

## 2019-06-10 ENCOUNTER — TELEPHONE (OUTPATIENT)
Dept: INFECTIOUS DISEASES | Age: 84
End: 2019-06-10

## 2019-06-10 ENCOUNTER — OFFICE VISIT (OUTPATIENT)
Dept: INFECTIOUS DISEASES | Age: 84
End: 2019-06-10
Payer: MEDICARE

## 2019-06-10 VITALS
BODY MASS INDEX: 22.57 KG/M2 | RESPIRATION RATE: 16 BRPM | SYSTOLIC BLOOD PRESSURE: 88 MMHG | HEIGHT: 69 IN | DIASTOLIC BLOOD PRESSURE: 50 MMHG | TEMPERATURE: 98.2 F | HEART RATE: 81 BPM | WEIGHT: 152.4 LBS

## 2019-06-10 DIAGNOSIS — N39.0 ACUTE LOWER UTI: Primary | ICD-10-CM

## 2019-06-10 DIAGNOSIS — A49.8 PSEUDOMONAS AERUGINOSA INFECTION: ICD-10-CM

## 2019-06-10 PROCEDURE — 99203 OFFICE O/P NEW LOW 30 MIN: CPT | Performed by: INTERNAL MEDICINE

## 2019-06-10 ASSESSMENT — ENCOUNTER SYMPTOMS
ABDOMINAL PAIN: 1
NAUSEA: 1
BACK PAIN: 1
DIARRHEA: 1
SHORTNESS OF BREATH: 1

## 2019-06-10 NOTE — TELEPHONE ENCOUNTER
Per Intake Dept, eligibility and o/o/p cost have been determined for IV Abx treatment. Will call patient with Update. Cost will be approx. $ 104.20 per 5 days, for IV Abx plus supplies. [facility]- Eligible. Call to patient, he is still at O/P infusion, explained the costs, advised at this hour he will get the supplies but not a Home visit by a nurse until tomorrow. At That time he will be given instructions. He states he has a neighbor who is a nurse that will be of assistance. He has agreed to cost and home infusion of IV Abx treatment. Return call to Promise Zhao at [address] [place] [address] and advised patient has agreed. She states [facility] will be in the morning.

## 2019-06-10 NOTE — PROGRESS NOTES
Subjective:      Patient ID: Des Truong is a 80 y.o. male. HPI  Referred by Dr Ad Kimbrough for Pseudomonas UTI, not responsive to oral antibiotics that he has been taking for the nino 21 days. Needs a ureteral stent replaced, scheduled on 06/21. Has AICD. Has H/O kidney cancer  Past Medical History:   Diagnosis Date    Abnormal finding on EKG 6/13/2016    Atrial fibrillation (HCC) 1/22/2018    BPH (benign prostatic hypertrophy)     Cancer (HCC)     Congestive heart failure ([address] 1/24/2019    GERD (gastroesophageal reflux disease)     Hyperlipidemia     Hypertension     Left bundle branch block 6/13/2016    Osteoarthritis     Sinus bradycardia on ECG 6/13/2016    Type II or unspecified type diabetes mellitus without mention of complication, not stated as uncontrolled      Past Surgical History:   Procedure Laterality Date    ANGIOPLASTY  05/08/2018    percutaneous transluminal angioplasty of the SMA with stent implantation, Tamara Keene    COLONOSCOPY  2016    CYSTOSCOPY  01/25/2019    DR Ray Medellin    [place] ESOPHAGOGASTRODUODENOSCOPY TRANSORAL DIAGNOSTIC N/A 6/5/2018    EGD ESOPHAGOGASTRODUODENOSCOPY performed by Telma Crum MD at [place] [address]  08/12/2016    left kidney, Dr. Azucena Hopper  01/2019     Social History     Socioeconomic History    Marital status:       Spouse name: Not on file    Number of children: Not on file    Years of education: Not on file    Highest education level: Not on file   Occupational History    Not on file   Social Needs    Financial resource strain: Not on file    Food insecurity:     Worry: Not on file     Inability: Not on file    Transportation needs:     Medical: Not on file     Non-medical: Not on file   Tobacco Use    Smoking status: Never Smoker    Smokeless tobacco: Never Used   Substance and Sexual Activity    Alcohol use: Yes     Comment: Rarely beer    Drug use: No    Sexual activity: Not on file   Lifestyle    Physical activity:     Days per week: Not on file     Minutes per session: Not on file    Stress: Not on file   Relationships    Social connections:     Talks on phone: Not on file     Gets together: Not on file     Attends Religion service: Not on file     Active member of club or organization: Not on file     Attends meetings of clubs or organizations: Not on file     Relationship status: Not on file    Intimate partner violence:     Fear of current or ex partner: Not on file     Emotionally abused: Not on file     Physically abused: Not on file     Forced sexual activity: Not on file   Other Topics Concern    Not on file   Social History Narrative    Not on file     Family History   Problem Relation Age of Onset    Heart Attack Father     Heart Attack Brother      Allergies   Allergen Reactions    Morphine      Other reaction(s): Delirium     Current Outpatient Medications on File Prior to Visit   Medication Sig Dispense Refill    metoprolol tartrate (LOPRESSOR) 25 MG tablet Take 0.5 tablets by mouth 2 times daily 60 tablet 3    spironolactone (ALDACTONE) 25 MG tablet Take 1 tablet by mouth daily 30 tablet 5    FERROUS SULFATE PO Take by mouth      tamsulosin (FLOMAX) 0.4 MG capsule Take 0.4 mg by mouth daily      simvastatin (ZOCOR) 20 MG tablet Take 1 tablet by mouth nightly 90 tablet 0    docusate sodium (COLACE) 100 MG capsule Take 100 mg by mouth 2 times daily      finasteride (PROSCAR) 5 MG tablet Take 5 mg by mouth daily      Multiple Vitamin (ONE-A-DAY ESSENTIAL PO) Take 1 tablet by mouth daily      Multiple Vitamins-Minerals (EYE VITAMINS) CAPS Take 1 capsule by mouth daily      omeprazole (PRILOSEC) 20 MG delayed release capsule Take 1 capsule by mouth daily 30 capsule 0    ondansetron (ZOFRAN) 4 MG tablet TAKE 1 TO 2 TABLETS EVERY 4 TO 6 HOURS AS NEEDED FOR NAUSEA AND VOMITING 30 tablet 1    latanoprost (XALATAN) 0.005 % ophthalmic solution Place 1 drop into both eyes nightly      aspirin 81 MG EC tablet Take 1 tablet by mouth daily 30 tablet 3    Psyllium (METAMUCIL) 48.57 % POWD Take by mouth daily as needed       polyethylene glycol (GLYCOLAX) powder Take 17 g by mouth daily as needed        No current facility-administered medications on file prior to visit. Review of Systems   Constitutional: Positive for appetite change and unexpected weight change. Respiratory: Positive for shortness of breath. Gastrointestinal: Positive for abdominal pain, diarrhea (3-4 times, on Miralax) and nausea. Genitourinary: Positive for difficulty urinating, dysuria and frequency. Musculoskeletal: Positive for back pain. All other systems reviewed and are negative. Objective:   Physical Exam   Constitutional: He is oriented to person, place, and time. No distress. HENT:   Mouth/Throat: No oropharyngeal exudate. Eyes: No scleral icterus. Neck: Normal range of motion. Neck supple. No JVD present. No thyromegaly present. Cardiovascular: Normal rate and regular rhythm. No murmur heard. Pulmonary/Chest: Effort normal. He has rales (B basilar). Abdominal: Soft. Bowel sounds are normal. He exhibits no distension and no mass. There is tenderness (LLQ). Musculoskeletal: He exhibits no edema. Lymphadenopathy:     He has no cervical adenopathy. Neurological: He is alert and oriented to person, place, and time. No cranial nerve deficit. He exhibits normal muscle tone. Coordination normal.   Skin: No rash noted. No erythema. Psychiatric: He has a normal mood and affect. His behavior is normal.   Nursing note and vitals reviewed.   Urine Cx Pseudomonas  U/A> 50 WBC  Cr=1.34  Assessment:      Acute lower UTI with Pseudomonas MDRO  Chronic ureteral stent and H/O Renal cancer      Plan:      Meropenem 1 gm IV Q 12 hrs for 2 weeks  CBC BMP now and weekly  U/A with reflex Cx in 10 days  PICC Guevara Gonsales MD

## 2019-06-11 LAB
ANION GAP SERPL CALCULATED.3IONS-SCNC: 13 MMOL/L (ref 10–20)
BICARBONATE: 24 MMOL/L (ref 21–32)
CALCIUM SERPL-MCNC: 9.1 MG/DL (ref 8.6–10.3)
CHLORIDE BLD-SCNC: 104 MMOL/L (ref 98–107)
CREAT SERPL-MCNC: 1.53 MG/DL (ref 0.5–1.3)
ERYTHROCYTE [DISTWIDTH] IN BLOOD BY AUTOMATED COUNT: 13.4 % (ref 11.5–14)
GFR AFRICAN AMERICAN: 52 ML/MIN/1.73M2
GFR NON-AFRICAN AMERICAN: 43 ML/MIN/1.73M2
GLUCOSE: 95 MG/DL (ref 74–99)
HCT VFR BLD CALC: 33.5 % (ref 41–52)
HEMOGLOBIN: 10.9 G/DL (ref 13.5–17)
MCHC RBC AUTO-ENTMCNC: 32.5 G/DL (ref 32–36)
MCV RBC AUTO: 96 FL (ref 80–100)
PLATELET # BLD: 192 X10E9/L (ref 150–450)
POTASSIUM SERPL-SCNC: 4.9 MMOL/L (ref 3.5–5.3)
RBC # BLD: 3.5 X10E12/L (ref 4.5–5.9)
SODIUM BLD-SCNC: 136 MMOL/L (ref 136–145)
UREA NITROGEN: 36 MG/DL (ref 6–23)
WBC: 6.8 X10E9/L (ref 4.4–11.3)

## 2019-06-18 ENCOUNTER — TELEPHONE (OUTPATIENT)
Dept: ADMINISTRATIVE | Age: 84
End: 2019-06-18

## 2019-06-18 LAB
ANION GAP SERPL CALCULATED.3IONS-SCNC: 13 MMOL/L (ref 10–20)
BASOPHILS # BLD: 0.03 X10E9/L (ref 0–0.1)
BASOPHILS RELATIVE PERCENT: 0.5 % (ref 0–2)
BICARBONATE: 25 MMOL/L (ref 21–32)
CALCIUM SERPL-MCNC: 8.9 MG/DL (ref 8.6–10.3)
CHLORIDE BLD-SCNC: 105 MMOL/L (ref 98–107)
CREAT SERPL-MCNC: 1.38 MG/DL (ref 0.5–1.3)
EOSINOPHIL # BLD: 0.34 X10E9/L (ref 0–0.4)
EOSINOPHILS RELATIVE PERCENT: 5.3 % (ref 0–6)
ERYTHROCYTE [DISTWIDTH] IN BLOOD BY AUTOMATED COUNT: 13.2 % (ref 11.5–14)
GFR AFRICAN AMERICAN: 59 ML/MIN/1.73M2
GFR NON-AFRICAN AMERICAN: 49 ML/MIN/1.73M2
GLUCOSE: 103 MG/DL (ref 74–99)
HCT VFR BLD CALC: 33 % (ref 41–52)
HEMOGLOBIN: 10.9 G/DL (ref 13.5–17)
IMMATURE GRANULOCYTES %: 0.3 % (ref 0–0.9)
LYMPHOCYTES # BLD: 19.4 % (ref 13–44)
LYMPHOCYTES RELATIVE PERCENT: 1.25 X10E9/L (ref 0.8–3)
MCHC RBC AUTO-ENTMCNC: 33 G/DL (ref 32–36)
MCV RBC AUTO: 95 FL (ref 80–100)
MONOCYTES # BLD: 0.58 X10E9/L (ref 0.05–0.8)
MONOCYTES RELATIVE PERCENT: 9 % (ref 2–10)
NEUTROPHILS RELATIVE PERCENT: 65.5 % (ref 40–80)
NEUTROPHILS: 4.22 X10E9/L (ref 1.6–5.5)
PLATELET # BLD: 209 X10E9/L (ref 150–450)
POTASSIUM SERPL-SCNC: 4.5 MMOL/L (ref 3.5–5.3)
RBC # BLD: 3.46 X10E12/L (ref 4.5–5.9)
SODIUM BLD-SCNC: 138 MMOL/L (ref 136–145)
UREA NITROGEN: 35 MG/DL (ref 6–23)
WBC: 6.4 X10E9/L (ref 4.4–11.3)

## 2019-06-20 LAB
APPEARANCE: ABNORMAL
BACTERIA, URINE: ABNORMAL /HPF
BILIRUBIN, URINE: NEGATIVE
BLOOD, URINE: ABNORMAL
COLOR, URINE: YELLOW
GLUCOSE, URINE: NEGATIVE MG/DL
KETONES, URINE: NEGATIVE MG/DL
LEUKOCYTE ESTERASE, URINE: ABNORMAL
NITRITE, URINE: NEGATIVE
PH UA: 7.5 (ref 5–8)
PROTEIN UA: ABNORMAL MG/DL
RBC URINE: 164 /HPF (ref 0–5)
SPECIFIC GRAVITY, URINE: 1.01 (ref 1–1)
UROBILINOGEN, URINE: 0.2 MG/DL (ref 0–1.9)
WBC URINE: 66 /HPF (ref 0–5)

## 2019-06-24 ENCOUNTER — OFFICE VISIT (OUTPATIENT)
Dept: INFECTIOUS DISEASES | Age: 84
End: 2019-06-24
Payer: MEDICARE

## 2019-06-24 VITALS
RESPIRATION RATE: 16 BRPM | BODY MASS INDEX: 23.02 KG/M2 | TEMPERATURE: 97.4 F | HEIGHT: 69 IN | WEIGHT: 155.4 LBS | HEART RATE: 73 BPM | SYSTOLIC BLOOD PRESSURE: 106 MMHG | DIASTOLIC BLOOD PRESSURE: 64 MMHG

## 2019-06-24 DIAGNOSIS — N39.0 ACUTE LOWER UTI: Primary | ICD-10-CM

## 2019-06-24 PROCEDURE — 99213 OFFICE O/P EST LOW 20 MIN: CPT | Performed by: INTERNAL MEDICINE

## 2019-06-24 NOTE — PROGRESS NOTES
Subjective:      Patient ID: Georges Palomino is a 80 y.o. male. HPI  F/U UTI on IV Meropenem, resolved dysuria and LLQ abdominal pain. Stent removal was postponed till 07/09  PMHx, allergies and social Hx were reviewed    Review of Systems   All other systems reviewed and are negative. Objective:   Physical Exam   Constitutional: He is oriented to person, place, and time. No distress. HENT:   Mouth/Throat: No oropharyngeal exudate. Eyes: No scleral icterus. Neck: Normal range of motion. Neck supple. Cardiovascular: Normal rate, regular rhythm and normal heart sounds. No murmur heard. Pulmonary/Chest: Effort normal and breath sounds normal. No respiratory distress. He has no wheezes. He has no rales. Abdominal: Soft. Bowel sounds are normal. He exhibits no distension. There is no tenderness. There is no rebound and no CVA tenderness. Musculoskeletal: He exhibits no edema. Lymphadenopathy:     He has no cervical adenopathy. Neurological: He is alert and oriented to person, place, and time. No cranial nerve deficit. He exhibits normal muscle tone. Coordination normal.   Skin: No rash noted. No erythema. Psychiatric: He has a normal mood and affect. His behavior is normal.   Nursing note and vitals reviewed.     Urine Cx negative  U/A + LE and Blood  Assessment:      Acute lower UTI with Pseudomonas MDRO  Chronic ureteral stent and H/O Renal cancer      Plan:      Maintain PICC  U/A with reflex Cx few days before stent removal and retreat  If necessary  D/C pICC after procedure        Yessy Lakhani MD

## 2019-07-02 LAB
ANION GAP SERPL CALCULATED.3IONS-SCNC: 16 MMOL/L (ref 10–20)
APPEARANCE: ABNORMAL
BACTERIA, URINE: ABNORMAL /HPF
BICARBONATE: 24 MMOL/L (ref 21–32)
BILIRUBIN, URINE: NEGATIVE
BLOOD, URINE: ABNORMAL
CALCIUM SERPL-MCNC: 9 MG/DL (ref 8.6–10.3)
CHLORIDE BLD-SCNC: 103 MMOL/L (ref 98–107)
COLOR, URINE: ABNORMAL
CREAT SERPL-MCNC: 1.53 MG/DL (ref 0.5–1.3)
ERYTHROCYTE [DISTWIDTH] IN BLOOD BY AUTOMATED COUNT: 13.2 % (ref 11.5–14)
ESTIMATED AVERAGE GLUCOSE: 111 MG/DL
GFR AFRICAN AMERICAN: 52 ML/MIN/1.73M2
GFR NON-AFRICAN AMERICAN: 43 ML/MIN/1.73M2
GLUCOSE, URINE: NEGATIVE MG/DL
GLUCOSE: 68 MG/DL (ref 74–99)
HBA1C MFR BLD: 5.5 %
HCT VFR BLD CALC: 34.6 % (ref 41–52)
HEMOGLOBIN: 11.3 G/DL (ref 13.5–17)
KETONES, URINE: NEGATIVE MG/DL
LEUKOCYTE ESTERASE, URINE: ABNORMAL
MCHC RBC AUTO-ENTMCNC: 32.7 G/DL (ref 32–36)
MCV RBC AUTO: 94 FL (ref 80–100)
MUCUS, URINE: ABNORMAL /LPF
NITRITE, URINE: POSITIVE
PH UA: 6 (ref 5–8)
PLATELET # BLD: 193 X10E9/L (ref 150–450)
POTASSIUM SERPL-SCNC: 4.8 MMOL/L (ref 3.5–5.3)
PROTEIN UA: ABNORMAL MG/DL
RBC # BLD: 3.67 X10E12/L (ref 4.5–5.9)
RBC URINE: 85 /HPF (ref 0–5)
SODIUM BLD-SCNC: 138 MMOL/L (ref 136–145)
SPECIFIC GRAVITY, URINE: 1.02 (ref 1–1)
SPECIMEN SOURCE: ABNORMAL
SQUAMOUS EPITHELIAL: 2 /HPF
UREA NITROGEN: 35 MG/DL (ref 6–23)
UROBILINOGEN, URINE: <2 MG/DL (ref 0–1.9)
WBC CLUMPS, URINE: ABNORMAL /HPF
WBC URINE: >182 /HPF (ref 0–5)
WBC: 6.5 X10E9/L (ref 4.4–11.3)

## 2019-07-03 ENCOUNTER — TELEPHONE (OUTPATIENT)
Dept: INFECTIOUS DISEASES | Age: 84
End: 2019-07-03

## 2019-07-03 NOTE — TELEPHONE ENCOUNTER
Patient calls with SnS of possible UTI. Has urgency and frequency, Had a Urine sample sent in yesterday, 7/2/19. Patient still has PICC Line in, as recommended by Dr Radha Truong, due to procedure of stent removal to be done by Urologist.  Will update Dr Radha Truong for further recommendations.

## 2019-07-09 LAB
ANION GAP SERPL CALCULATED.3IONS-SCNC: 15 MMOL/L (ref 10–20)
BICARBONATE: 24 MMOL/L (ref 21–32)
CALCIUM SERPL-MCNC: 9.1 MG/DL (ref 8.6–10.3)
CHLORIDE BLD-SCNC: 103 MMOL/L (ref 98–107)
CREAT SERPL-MCNC: 1.35 MG/DL (ref 0.5–1.3)
ERYTHROCYTE [DISTWIDTH] IN BLOOD BY AUTOMATED COUNT: 12.9 % (ref 11.5–14)
GFR AFRICAN AMERICAN: 61 ML/MIN/1.73M2
GFR NON-AFRICAN AMERICAN: 50 ML/MIN/1.73M2
GLUCOSE: 98 MG/DL (ref 74–99)
HCT VFR BLD CALC: 33 % (ref 41–52)
HEMOGLOBIN: 10.9 G/DL (ref 13.5–17)
MCHC RBC AUTO-ENTMCNC: 33 G/DL (ref 32–36)
MCV RBC AUTO: 95 FL (ref 80–100)
PLATELET # BLD: 177 X10E9/L (ref 150–450)
POTASSIUM SERPL-SCNC: 4.5 MMOL/L (ref 3.5–5.3)
RBC # BLD: 3.49 X10E12/L (ref 4.5–5.9)
SODIUM BLD-SCNC: 137 MMOL/L (ref 136–145)
UREA NITROGEN: 42 MG/DL (ref 6–23)
WBC: 5.5 X10E9/L (ref 4.4–11.3)

## 2019-07-16 LAB
ANION GAP SERPL CALCULATED.3IONS-SCNC: 13 MMOL/L (ref 10–20)
APPEARANCE: ABNORMAL
BICARBONATE: 25 MMOL/L (ref 21–32)
BILIRUBIN, URINE: NEGATIVE
BLOOD, URINE: NEGATIVE
CALCIUM SERPL-MCNC: 9 MG/DL (ref 8.6–10.3)
CHLORIDE BLD-SCNC: 102 MMOL/L (ref 98–107)
COLOR, URINE: YELLOW
CREAT SERPL-MCNC: 1.45 MG/DL (ref 0.5–1.3)
ERYTHROCYTE [DISTWIDTH] IN BLOOD BY AUTOMATED COUNT: 13 % (ref 11.5–14)
GFR AFRICAN AMERICAN: 56 ML/MIN/1.73M2
GFR NON-AFRICAN AMERICAN: 46 ML/MIN/1.73M2
GLUCOSE, URINE: NEGATIVE MG/DL
GLUCOSE: 103 MG/DL (ref 74–99)
HCT VFR BLD CALC: 31.7 % (ref 41–52)
HEMOGLOBIN: 10.5 G/DL (ref 13.5–17)
KETONES, URINE: NEGATIVE MG/DL
LEUKOCYTE ESTERASE, URINE: ABNORMAL
MCHC RBC AUTO-ENTMCNC: 33.1 G/DL (ref 32–36)
MCV RBC AUTO: 93 FL (ref 80–100)
NITRITE, URINE: NEGATIVE
PH UA: 7 (ref 5–8)
PLATELET # BLD: 195 X10E9/L (ref 150–450)
POTASSIUM SERPL-SCNC: 4.9 MMOL/L (ref 3.5–5.3)
PROTEIN UA: ABNORMAL MG/DL
RBC # BLD: 3.41 X10E12/L (ref 4.5–5.9)
RBC URINE: 9 /HPF (ref 0–5)
SODIUM BLD-SCNC: 135 MMOL/L (ref 136–145)
SPECIFIC GRAVITY, URINE: 1.01 (ref 1–1)
SQUAMOUS EPITHELIAL: <1 /HPF
UREA NITROGEN: 35 MG/DL (ref 6–23)
UROBILINOGEN, URINE: <2 MG/DL (ref 0–1.9)
WBC CLUMPS, URINE: ABNORMAL /HPF
WBC URINE: 88 /HPF (ref 0–5)
WBC: 7.4 X10E9/L (ref 4.4–11.3)

## 2019-07-18 ENCOUNTER — TELEPHONE (OUTPATIENT)
Dept: INFECTIOUS DISEASES | Age: 84
End: 2019-07-18

## 2019-07-19 ENCOUNTER — TELEPHONE (OUTPATIENT)
Dept: INFECTIOUS DISEASES | Age: 84
End: 2019-07-19

## 2019-07-19 NOTE — TELEPHONE ENCOUNTER
Call received from [place] [address] office. Pt is scheduled 8/8 for stent removal. Dr. Servando Lane requested pt has repeat UA CNS 10 days prior to procedure and start antibiotics 5 days prior. Reviewed with Dr. Isma Erazo. Per Dr. Isma Erazo, pt needs scheduled for appt 8/5. Continue IV Antibiotics one more week and then maintain picc until 8/5 appt. Urine sample to be collected at appt. Reviewed orders with  pharmacy-Nevin. Nevin verbalized understanding. Reviewed orders with pt. Pt verbalized understanding.

## 2019-07-25 ENCOUNTER — OFFICE VISIT (OUTPATIENT)
Dept: CARDIOLOGY CLINIC | Age: 84
End: 2019-07-25
Payer: MEDICARE

## 2019-07-25 DIAGNOSIS — K55.9 MESENTERIC ISCHEMIA (HCC): ICD-10-CM

## 2019-07-25 DIAGNOSIS — I48.19 PERSISTENT ATRIAL FIBRILLATION (HCC): ICD-10-CM

## 2019-07-25 DIAGNOSIS — I42.9 CARDIOMYOPATHY, UNSPECIFIED TYPE (HCC): ICD-10-CM

## 2019-07-25 DIAGNOSIS — I10 ESSENTIAL HYPERTENSION: ICD-10-CM

## 2019-07-25 DIAGNOSIS — I25.84 CORONARY ATHEROSCLEROSIS DUE TO CALCIFIED CORONARY LESION (CODE): Primary | ICD-10-CM

## 2019-07-25 DIAGNOSIS — E78.5 HYPERLIPIDEMIA, UNSPECIFIED HYPERLIPIDEMIA TYPE: ICD-10-CM

## 2019-07-25 DIAGNOSIS — Z95.810 ICD (IMPLANTABLE CARDIOVERTER-DEFIBRILLATOR) IN PLACE: ICD-10-CM

## 2019-07-25 DIAGNOSIS — I44.7 LEFT BUNDLE BRANCH BLOCK: ICD-10-CM

## 2019-07-25 PROCEDURE — 99213 OFFICE O/P EST LOW 20 MIN: CPT | Performed by: INTERNAL MEDICINE

## 2019-07-25 NOTE — PROGRESS NOTES
[facility] CARDIOLOGY OFFICE FOLLOW-UP      Patient: Collette Rojas  YOB: 1934  MRN: [id number]    Chief Complaint:  Chief Complaint   Patient presents with    Atrial Fibrillation    Congestive Heart Failure    Hypertension    Hyperlipidemia    Discuss Medications     IS PATIENT SUPPOSE TO BE TAKING LOPRESSOR 25 MG        Subjective/HPI    The patient is followed in the cardiology office chronically for the following problems: CAD, ICM, prior CABG, HTN, HPL, PAF, mesenteric ischemia s/p stenting. The last encounter focused on the following: followup    Testing/hospitalizations/procedures performed since last encounter: none    Since the last encounter, the patient has not had new symptoms/events. Past Medical History:   Diagnosis Date    Abnormal finding on EKG 6/13/2016    Atrial fibrillation (HCC) 1/22/2018    BPH (benign prostatic hypertrophy)     Cancer (HCC)     Congestive heart failure ([address] 1/24/2019    GERD (gastroesophageal reflux disease)     Hyperlipidemia     Hypertension     Left bundle branch block 6/13/2016    Osteoarthritis     Sinus bradycardia on ECG 6/13/2016    Type II or unspecified type diabetes mellitus without mention of complication, not stated as uncontrolled        Past Surgical History:   Procedure Laterality Date    ANGIOPLASTY  05/08/2018    percutaneous transluminal angioplasty of the SMA with stent implantation, Lu Clark    COLONOSCOPY  2016    CYSTOSCOPY  01/25/2019    DR Ivory Penn    [place] ESOPHAGOGASTRODUODENOSCOPY TRANSORAL DIAGNOSTIC N/A 6/5/2018    EGD ESOPHAGOGASTRODUODENOSCOPY performed by Lincoln Hollingsworth MD at [place] [address]  08/12/2016    left kidney, Dr. Noel Wiggins  01/2019       Family History   Problem Relation Age of Onset    Heart Attack Father     Heart Attack Brother        Social History     Socioeconomic History    Marital status:       Spouse name: Assessment/Plan:    1. Coronary atherosclerosis due to calcified coronary lesion (CODE)   Coronary artery disease: This patient has known CAD and remote PCI. Currently the angina class is I. The patient will be treated with guideline-directed therapy including aspirin, statin, b-blocker. The patient currently does need a thienopyridine. The patient's risk factors will be optimally managed. The patient is given appropriate counseling given to avoid tobacco.  The patient will continue on current medications. 2. Cardiomyopathy, unspecified type ([address]  ICM s/p ICD     3. Left bundle branch block      4. Persistent atrial fibrillation (HCC)  Atrial fibrillation: The patient has paroxysmal atrial fibrillation with no symptoms. The heart rhythm is sinus and well-controlled. The patient will be on appropriate anticoagulation with DOAC unless contraindicated if MADISON-VASC score >=1. The risks/benefits/alternatives of anticoagulation were reviewed. The patient will continue on current medications. 5. Mesenteric ischemia (HCC)  S/P PTA    6. Essential hypertension  Patient has essential hypertension on BP medications. The guideline-directed target for BP in this patient is <130/80. In order to reach our target BP we will continue current BP medications, increasing the dose of current meds or adding new to reach target. 7. Hyperlipidemia, unspecified hyperlipidemia type  The patient has hyperlipidemia without statin intolerance. The patient will be continued on high intensity statin. The labs are managed by PCP. As per recent guidelines, moderate dose high intensity statin is indicated. 8. ICD (implantable cardioverter-defibrillator) in place         Counseling: the patient was counseled regarding diet, exercise, weight control and heart healthy lifestyle. No follow-ups on file. Electronically signed by   Kaity Chavez MD [facility] Director of Cardiology Services and

## 2019-08-05 ENCOUNTER — OFFICE VISIT (OUTPATIENT)
Dept: INFECTIOUS DISEASES | Age: 84
End: 2019-08-05
Payer: MEDICARE

## 2019-08-05 VITALS
HEART RATE: 77 BPM | WEIGHT: 154 LBS | SYSTOLIC BLOOD PRESSURE: 104 MMHG | HEIGHT: 69 IN | BODY MASS INDEX: 22.81 KG/M2 | DIASTOLIC BLOOD PRESSURE: 64 MMHG | TEMPERATURE: 97.8 F | RESPIRATION RATE: 20 BRPM

## 2019-08-05 DIAGNOSIS — A49.8 PSEUDOMONAS AERUGINOSA INFECTION: Primary | ICD-10-CM

## 2019-08-05 DIAGNOSIS — A49.8 PSEUDOMONAS AERUGINOSA INFECTION: ICD-10-CM

## 2019-08-05 DIAGNOSIS — N39.0 ACUTE LOWER UTI: ICD-10-CM

## 2019-08-05 LAB
BACTERIA: NEGATIVE /HPF
BILIRUBIN URINE: NEGATIVE
BLOOD, URINE: ABNORMAL
CLARITY: ABNORMAL
COLOR: ABNORMAL
EPITHELIAL CELLS, UA: ABNORMAL /HPF (ref 0–5)
GLUCOSE URINE: NEGATIVE MG/DL
HYALINE CASTS: ABNORMAL /HPF (ref 0–5)
KETONES, URINE: NEGATIVE MG/DL
LEUKOCYTE ESTERASE, URINE: ABNORMAL
NITRITE, URINE: NEGATIVE
PH UA: 6.5 (ref 5–9)
PROTEIN UA: 100 MG/DL
RBC UA: ABNORMAL /HPF (ref 0–5)
RENAL EPITHELIAL, UA: ABNORMAL /HPF
SPECIFIC GRAVITY UA: 1.02 (ref 1–1.03)
URINE REFLEX TO CULTURE: YES
UROBILINOGEN, URINE: 0.2 E.U./DL
WBC UA: >100 /HPF (ref 0–5)

## 2019-08-05 PROCEDURE — 99213 OFFICE O/P EST LOW 20 MIN: CPT | Performed by: INTERNAL MEDICINE

## 2019-08-06 LAB
ANION GAP SERPL CALCULATED.3IONS-SCNC: 16 MMOL/L (ref 10–20)
BICARBONATE: 24 MMOL/L (ref 21–32)
CALCIUM SERPL-MCNC: 8.8 MG/DL (ref 8.6–10.3)
CHLORIDE BLD-SCNC: 103 MMOL/L (ref 98–107)
CREAT SERPL-MCNC: 1.63 MG/DL (ref 0.5–1.3)
ERYTHROCYTE [DISTWIDTH] IN BLOOD BY AUTOMATED COUNT: 13.1 % (ref 11.5–14)
GFR AFRICAN AMERICAN: 48 ML/MIN/1.73M2
GFR NON-AFRICAN AMERICAN: 40 ML/MIN/1.73M2
GLUCOSE: 92 MG/DL (ref 74–99)
HCT VFR BLD CALC: 33.3 % (ref 41–52)
HEMOGLOBIN: 10.8 G/DL (ref 13.5–17)
MCHC RBC AUTO-ENTMCNC: 32.4 G/DL (ref 32–36)
MCV RBC AUTO: 94 FL (ref 80–100)
PLATELET # BLD: 180 X10E9/L (ref 150–450)
POTASSIUM SERPL-SCNC: 4.7 MMOL/L (ref 3.5–5.3)
RBC # BLD: 3.54 X10E12/L (ref 4.5–5.9)
SODIUM BLD-SCNC: 138 MMOL/L (ref 136–145)
UREA NITROGEN: 44 MG/DL (ref 6–23)
WBC: 5.8 X10E9/L (ref 4.4–11.3)

## 2019-08-08 LAB
ORGANISM: ABNORMAL
URINE CULTURE, ROUTINE: ABNORMAL
URINE CULTURE, ROUTINE: ABNORMAL

## 2019-08-12 ENCOUNTER — TELEPHONE (OUTPATIENT)
Dept: INFECTIOUS DISEASES | Age: 84
End: 2019-08-12

## 2019-08-13 LAB
ANION GAP SERPL CALCULATED.3IONS-SCNC: 16 MMOL/L (ref 10–20)
BICARBONATE: 26 MMOL/L (ref 21–32)
CALCIUM SERPL-MCNC: 9.1 MG/DL (ref 8.6–10.3)
CHLORIDE BLD-SCNC: 102 MMOL/L (ref 98–107)
CREAT SERPL-MCNC: 1.15 MG/DL (ref 0.5–1.3)
ERYTHROCYTE [DISTWIDTH] IN BLOOD BY AUTOMATED COUNT: 13.2 % (ref 11.5–14)
GFR AFRICAN AMERICAN: 73 ML/MIN/1.73M2
GFR NON-AFRICAN AMERICAN: 60 ML/MIN/1.73M2
GLUCOSE: 101 MG/DL (ref 74–99)
HCT VFR BLD CALC: 32.6 % (ref 41–52)
HEMOGLOBIN: 10.8 G/DL (ref 13.5–17)
MCHC RBC AUTO-ENTMCNC: 33.1 G/DL (ref 32–36)
MCV RBC AUTO: 94 FL (ref 80–100)
PLATELET # BLD: 182 X10E9/L (ref 150–450)
POTASSIUM SERPL-SCNC: 4.8 MMOL/L (ref 3.5–5.3)
RBC # BLD: 3.48 X10E12/L (ref 4.5–5.9)
SODIUM BLD-SCNC: 139 MMOL/L (ref 136–145)
UREA NITROGEN: 34 MG/DL (ref 6–23)
WBC: 6.6 X10E9/L (ref 4.4–11.3)

## 2019-10-24 ENCOUNTER — OFFICE VISIT (OUTPATIENT)
Dept: CARDIOLOGY CLINIC | Age: 84
End: 2019-10-24
Payer: MEDICARE

## 2019-10-24 VITALS
DIASTOLIC BLOOD PRESSURE: 60 MMHG | HEIGHT: 69 IN | BODY MASS INDEX: 23.99 KG/M2 | OXYGEN SATURATION: 98 % | SYSTOLIC BLOOD PRESSURE: 110 MMHG | WEIGHT: 162 LBS | HEART RATE: 81 BPM

## 2019-10-24 DIAGNOSIS — G47.13 RECURRENT HYPERSOMNIA: ICD-10-CM

## 2019-10-24 DIAGNOSIS — R40.0 DAYTIME SOMNOLENCE: Primary | ICD-10-CM

## 2019-10-24 PROCEDURE — 99213 OFFICE O/P EST LOW 20 MIN: CPT | Performed by: INTERNAL MEDICINE

## 2020-02-27 ENCOUNTER — OFFICE VISIT (OUTPATIENT)
Dept: CARDIOLOGY CLINIC | Age: 85
End: 2020-02-27
Payer: MEDICARE

## 2020-02-27 VITALS
WEIGHT: 162.6 LBS | HEART RATE: 80 BPM | SYSTOLIC BLOOD PRESSURE: 110 MMHG | BODY MASS INDEX: 24.01 KG/M2 | DIASTOLIC BLOOD PRESSURE: 70 MMHG

## 2020-02-27 PROCEDURE — 93000 ELECTROCARDIOGRAM COMPLETE: CPT | Performed by: INTERNAL MEDICINE

## 2020-02-27 PROCEDURE — 99213 OFFICE O/P EST LOW 20 MIN: CPT | Performed by: INTERNAL MEDICINE

## 2020-02-27 RX ORDER — ASPIRIN 81 MG/1
81 TABLET ORAL EVERY OTHER DAY
COMMUNITY
End: 2020-06-08 | Stop reason: ALTCHOICE

## 2020-02-27 RX ORDER — SPIRONOLACTONE 25 MG/1
25 TABLET ORAL EVERY OTHER DAY
COMMUNITY
End: 2020-06-08 | Stop reason: ALTCHOICE

## 2020-02-27 NOTE — PROGRESS NOTES
GLUCOSE 101 08/13/2019     Magnesium:    Lab Results   Component Value Date    MG 2.2 03/07/2019     TSH:No results found for: TSHFT4, TSH    Patient Active Problem List   Diagnosis    Hypertension    Benign prostatic hyperplasia    GERD (gastroesophageal reflux disease)    Osteoarthritis    Type 2 diabetes mellitus without complication [facility]    Hyperlipidemia    [facility]    Sinus bradycardia on ECG    Left bundle branch block    Abnormal finding on EKG    Primary bladder malignant neoplasm ([facility]    Hypogonadism in male    Atrial fibrillation (HCC)    Abdominal pain    Mesenteric ischemia (HCC)    Epigastric abdominal pain    Nausea    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    Urothelial carcinoma (HCC)    Chronic kidney disease, stage III (moderate) (HCC)    Congestive heart failure (HCC)       Medications:  Current Outpatient Medications   Medication Sig Dispense Refill    spironolactone (ALDACTONE) 25 MG tablet Take 25 mg by mouth every other day      aspirin 81 MG EC tablet Take 81 mg by mouth every other day      tamsulosin (FLOMAX) 0.4 MG capsule Take 0.4 mg by mouth daily      simvastatin (ZOCOR) 20 MG tablet Take 1 tablet by mouth nightly 90 tablet 0    docusate sodium (COLACE) 100 MG capsule Take 100 mg by mouth 2 times daily      Multiple Vitamin (ONE-A-DAY ESSENTIAL PO) Take 1 tablet by mouth daily      Multiple Vitamins-Minerals (EYE VITAMINS) CAPS Take 1 capsule by mouth daily      omeprazole (PRILOSEC) 20 MG delayed release capsule Take 1 capsule by mouth daily 30 capsule 0    ondansetron (ZOFRAN) 4 MG tablet TAKE 1 TO 2 TABLETS EVERY 4 TO 6 HOURS AS NEEDED FOR NAUSEA AND VOMITING 30 tablet 1    latanoprost (XALATAN) 0.005 % ophthalmic solution Place 1 drop into both eyes nightly       No current facility-administered medications for this visit. Assessment/Plan:    1. Persistent atrial fibrillation  OAC, rate control  - EKG 12 Lead    2.  Coronary atherosclerosis due to calcified coronary lesion (CODE)   Stable no angina    3. Cardiomyopathy, unspecified type [facility])  Check labs  - Basic Metabolic Panel; Future  - Brain Natriuretic Peptide; Future    4. Left bundle branch block      5. Mesenteric ischemia (HCC)  Recurrent symptoms check ultrasound  - US DUP ABD PEL RETRO SCROT COMPLETE; Future    6. Essential hypertension  Patient has essential hypertension on BP medications. The guideline-directed target for BP in this patient is <130/80. In order to reach our target BP we will continue current BP medications, increasing the dose of current meds or adding new to reach target. 7. Hyperlipidemia, unspecified hyperlipidemia type  The patient has hyperlipidemia without statin intolerance. The patient will be continued on high intensity statin. The labs are managed by PCP. As per recent guidelines, moderate dose high intensity statin is indicated. 8. ICD (implantable cardioverter-defibrillator) in place  Switch checks to me  - [facility])       Counseling: the patient was counseled regarding diet, exercise, weight control and heart healthy lifestyle. Return in about 4 weeks (around 3/26/2020) for followup cv disease. Electronically signed by   Carlean Castleman.  Ariel Ugalde MD [facility] Director of Cardiology Services and Cardiac Catheterization Laboratory  [facility], [place]    on 2/27/2020 at 2:01 PM

## 2020-02-28 LAB
ANION GAP SERPL CALCULATED.3IONS-SCNC: 13 MMOL/L (ref 10–20)
B-TYPE NATRIURETIC PEPTIDE: 1473 PG/ML (ref 0–99)
BICARBONATE: 28 MMOL/L (ref 21–32)
CALCIUM SERPL-MCNC: 9.2 MG/DL (ref 8.6–10.3)
CHLORIDE BLD-SCNC: 104 MMOL/L (ref 98–107)
CREAT SERPL-MCNC: 1.55 MG/DL (ref 0.5–1.3)
GFR AFRICAN AMERICAN: 52 ML/MIN/1.73M2
GFR NON-AFRICAN AMERICAN: 43 ML/MIN/1.73M2
GLUCOSE: 109 MG/DL (ref 74–99)
POTASSIUM SERPL-SCNC: 4.7 MMOL/L (ref 3.5–5.3)
SODIUM BLD-SCNC: 140 MMOL/L (ref 136–145)
UREA NITROGEN: 25 MG/DL (ref 6–23)

## 2020-03-04 ENCOUNTER — HOSPITAL ENCOUNTER (OUTPATIENT)
Dept: ULTRASOUND IMAGING | Age: 85
Discharge: HOME | End: 2020-03-06
Payer: MEDICARE

## 2020-03-04 PROCEDURE — 93975 VASCULAR STUDY: CPT

## 2020-03-26 ENCOUNTER — VIRTUAL VISIT (OUTPATIENT)
Dept: CARDIOLOGY CLINIC | Age: 85
End: 2020-03-26
Payer: MEDICARE

## 2020-03-26 PROCEDURE — 99213 OFFICE O/P EST LOW 20 MIN: CPT | Performed by: INTERNAL MEDICINE

## 2020-03-26 NOTE — PROGRESS NOTES
hiren  Historical Provider, MD       Social History     Tobacco Use    Smoking status: Never Smoker    Smokeless tobacco: Never Used   Substance Use Topics    Alcohol use: Yes     Comment: Rarely beer    Drug use: No        Allergies   Allergen Reactions    Morphine      Other reaction(s): Delirium   ,   Past Medical History:   Diagnosis Date    Abnormal finding on EKG 6/13/2016    Atrial fibrillation ([facility] 1/22/2018    BPH (benign prostatic hypertrophy)     Cancer (HCC)     Congestive heart failure ([facility] 1/24/2019    GERD (gastroesophageal reflux disease)     Hyperlipidemia     Hypertension     Left bundle branch block 6/13/2016    Osteoarthritis     Sinus bradycardia on ECG 6/13/2016    Type II or unspecified type diabetes mellitus without mention of complication, not stated as uncontrolled    ,   Past Surgical History:   Procedure Laterality Date    ANGIOPLASTY  05/08/2018    percutaneous transluminal angioplasty of the SMA with stent implantation, Chaz Peguero    COLONOSCOPY  2016    CYSTOSCOPY  01/25/2019    DR Vera Temple    [place] ESOPHAGOGASTRODUODENOSCOPY TRANSORAL DIAGNOSTIC N/A 6/5/2018    EGD ESOPHAGOGASTRODUODENOSCOPY performed by Anibal Farfan MD at [place] [address]  08/12/2016    left kidney, Dr. Montrell Willis  01/2019   ,   Social History     Tobacco Use    Smoking status: Never Smoker    Smokeless tobacco: Never Used   Substance Use Topics    Alcohol use: Yes     Comment: Rarely beer    Drug use: No   ,   Family History   Problem Relation Age of Onset    Heart Attack Father     Heart Attack Brother    ,   Immunization History   Administered Date(s) Administered    DT (pediatric) 02/24/2012    Influenza, High Dose (Fluzone 65 yrs and older) 09/15/2016, 09/14/2017, 09/14/2018    Pneumococcal Conjugate 13-valent ([id number]) 04/14/2016    Pneumococcal Polysaccharide ([id number]) 04/09/2013   ,   Health Maintenance Topic Date Due    Shingles Vaccine (1 of 2) 06/27/1984    Diabetic retinal exam  11/21/2017    Diabetic foot exam  04/19/2019    Annual Wellness Visit (AWV)  05/29/2019    A1C test (Diabetic or Prediabetic)  01/02/2020    Lipid screen  01/02/2020    PSA counseling  03/06/2020    Potassium monitoring  02/28/2021    Creatinine monitoring  02/28/2021    DTaP/Tdap/Td vaccine (2 - Tdap) 02/24/2022    Flu vaccine  Completed    Pneumococcal 65+ years Vaccine  Completed    Hepatitis A vaccine  Aged Out    Hib vaccine  Aged Out    Meningococcal (ACWY) vaccine  Aged Out       PHYSICAL EXAMINATION:  [ INSTRUCTIONS:  \"[x]\" Indicates a positive item  \"[]\" Indicates a negative item  -- DELETE ALL ITEMS NOT EXAMINED]  [] Alert  [] Oriented to person/place/time    [] No apparent distress  [] Toxic appearing    [] Face flushed appearing [] Sclera clear  [] Lips are cyanotic      [] Breathing appears normal  [] Appears tachypneic      [] Rash on visible skin    [] Cranial Nerves II-XII grossly intact    [] Motor grossly intact in visible upper extremities    [] Motor grossly intact in visible lower extremities    [] Normal Mood  [] Anxious appearing    [] Depressed appearing  [] Confused appearing      [] Poor short term memory  [] Poor long term memory    [] OTHER:      Due to this being a TeleHealth encounter, evaluation of the following organ systems is limited: Vitals/Constitutional/EENT/Resp/CV/GI//MS/Neuro/Skin/Heme-Lymph-Imm. ASSESSMENT/PLAN:  1. Mesenteric ischemia (HCC)  Eventual PTA when safe to return to elective procedures unless clinical deterioration occurs. Return in about 4 weeks (around 4/23/2020). An  electronic signature was used to authenticate this note.     --Jay Park MD on 3/26/2020 at 2:12 PM        Pursuant to the emergency declaration under the [facility] waiver authority and the [place] Resources and Response Supplemental Appropriations Act, this Virtual  Visit was conducted, with patient's consent, to reduce the patient's risk of exposure to COVID-19 and provide continuity of care for an established patient. Services were provided through a video synchronous discussion virtually to substitute for in-person clinic visit.

## 2020-06-04 LAB
BASOPHILS ABSOLUTE: ABNORMAL
BASOPHILS RELATIVE PERCENT: ABNORMAL
EOSINOPHILS ABSOLUTE: ABNORMAL
EOSINOPHILS RELATIVE PERCENT: ABNORMAL
HCT VFR BLD CALC: 28.6 % (ref 41–53)
HEMOGLOBIN: 9.7 G/DL (ref 13.5–17.5)
LYMPHOCYTES ABSOLUTE: ABNORMAL
LYMPHOCYTES RELATIVE PERCENT: ABNORMAL
MCH RBC QN AUTO: ABNORMAL PG
MCHC RBC AUTO-ENTMCNC: 33.9 G/DL
MCV RBC AUTO: 92 FL
MONOCYTES ABSOLUTE: ABNORMAL
MONOCYTES RELATIVE PERCENT: ABNORMAL
NEUTROPHILS ABSOLUTE: ABNORMAL
NEUTROPHILS RELATIVE PERCENT: ABNORMAL
PLATELET # BLD: 210 K/ΜL
PMV BLD AUTO: ABNORMAL FL
RBC # BLD: 3.12 10^6/ΜL
WBC # BLD: 12.5 10^3/ML

## 2020-06-08 ENCOUNTER — OFFICE VISIT (OUTPATIENT)
Dept: CARDIOLOGY CLINIC | Age: 85
End: 2020-06-08
Payer: MEDICARE

## 2020-06-08 ENCOUNTER — TELEPHONE (OUTPATIENT)
Dept: CARDIOLOGY CLINIC | Age: 85
End: 2020-06-08

## 2020-06-08 VITALS
OXYGEN SATURATION: 98 % | RESPIRATION RATE: 16 BRPM | DIASTOLIC BLOOD PRESSURE: 56 MMHG | SYSTOLIC BLOOD PRESSURE: 102 MMHG | HEART RATE: 78 BPM

## 2020-06-08 DIAGNOSIS — I10 ESSENTIAL HYPERTENSION: ICD-10-CM

## 2020-06-08 DIAGNOSIS — I42.9 CARDIOMYOPATHY, UNSPECIFIED TYPE (HCC): ICD-10-CM

## 2020-06-08 DIAGNOSIS — I50.41 ACUTE COMBINED SYSTOLIC (CONGESTIVE) AND DIASTOLIC (CONGESTIVE) HEART FAILURE (HCC): ICD-10-CM

## 2020-06-08 LAB
ANION GAP SERPL CALCULATED.3IONS-SCNC: 11 MEQ/L (ref 9–15)
BUN BLDV-MCNC: 70 MG/DL (ref 8–23)
CALCIUM SERPL-MCNC: 8.8 MG/DL (ref 8.5–9.9)
CHLORIDE BLD-SCNC: 102 MEQ/L (ref 95–107)
CO2: 25 MEQ/L (ref 20–31)
CREAT SERPL-MCNC: 1.92 MG/DL (ref 0.7–1.2)
GFR AFRICAN AMERICAN: 40.4
GFR NON-AFRICAN AMERICAN: 33.4
GLUCOSE BLD-MCNC: 115 MG/DL (ref 70–99)
POTASSIUM SERPL-SCNC: 4.2 MEQ/L (ref 3.4–4.9)
PRO-BNP: 2476 PG/ML
SODIUM BLD-SCNC: 138 MEQ/L (ref 135–144)

## 2020-06-08 PROCEDURE — 99214 OFFICE O/P EST MOD 30 MIN: CPT | Performed by: INTERNAL MEDICINE

## 2020-06-08 PROCEDURE — 1111F DSCHRG MED/CURRENT MED MERGE: CPT | Performed by: INTERNAL MEDICINE

## 2020-06-08 RX ORDER — CARVEDILOL 3.12 MG/1
3.12 TABLET ORAL 2 TIMES DAILY WITH MEALS
COMMUNITY
End: 2020-07-02 | Stop reason: ALTCHOICE

## 2020-06-08 RX ORDER — BENZONATATE 100 MG/1
100-200 CAPSULE ORAL 3 TIMES DAILY PRN
Qty: 60 CAPSULE | Refills: 0 | Status: SHIPPED | OUTPATIENT
Start: 2020-06-08 | End: 2020-06-15

## 2020-06-08 RX ORDER — FUROSEMIDE 40 MG/1
40 TABLET ORAL DAILY
COMMUNITY
End: 2020-06-08 | Stop reason: ALTCHOICE

## 2020-06-08 RX ORDER — SACUBITRIL AND VALSARTAN 49; 51 MG/1; MG/1
1 TABLET, FILM COATED ORAL 2 TIMES DAILY
COMMUNITY
End: 2020-06-08 | Stop reason: ALTCHOICE

## 2020-06-08 NOTE — PROGRESS NOTES
capsule by mouth daily      omeprazole (PRILOSEC) 20 MG delayed release capsule Take 1 capsule by mouth daily 30 capsule 0    ondansetron (ZOFRAN) 4 MG tablet TAKE 1 TO 2 TABLETS EVERY 4 TO 6 HOURS AS NEEDED FOR NAUSEA AND VOMITING 30 tablet 1    latanoprost (XALATAN) 0.005 % ophthalmic solution Place 1 drop into both eyes nightly       No current facility-administered medications for this visit. Review of Systems:   Review of Systems   Constitutional: Negative for activity change and appetite change. HENT: Negative for congestion. Respiratory: Negative for apnea, choking and chest tightness. Cardiovascular: Negative for chest pain. Gastrointestinal: Negative for abdominal distention and abdominal pain. Endocrine: Negative for cold intolerance and heat intolerance. Genitourinary: Negative for dysuria and enuresis. Musculoskeletal: Negative for arthralgias and back pain. Skin: Negative for color change. Allergic/Immunologic: Negative. Neurological: Negative for dizziness, seizures, syncope and light-headedness. Psychiatric/Behavioral: Negative for agitation, behavioral problems and confusion. Physical Examination:    BP (!) 102/56 (Site: Right Upper Arm, Position: Sitting, Cuff Size: Medium Adult)   Pulse 78   Resp 16   SpO2 98%    Physical Exam   Constitutional: The patient appears healthy. No distress. HENT: Mouth/Throat: Oropharynx is clear. Eyes: Pupils are equal, round, and reactive to light. Neck: Normal range of motion. No JVD present. Cardiovascular: Regular rhythm, S1 normal, S2 normal, normal heart sounds and intact distal pulses. Exam reveals no gallop. No murmur heard. Pulses:       Radial pulses are 2+ on the right side. Dorsalis pedis pulses are 2+ on the right side. Pulmonary/Chest: Effort normal and breath sounds normal. No wheezes. No rales. No tenderness. Abdominal: Soft.  Bowel sounds are normal.   Musculoskeletal: Normal range of

## 2020-06-18 ENCOUNTER — OFFICE VISIT (OUTPATIENT)
Dept: CARDIOLOGY CLINIC | Age: 85
End: 2020-06-18
Payer: MEDICARE

## 2020-06-18 VITALS
SYSTOLIC BLOOD PRESSURE: 116 MMHG | TEMPERATURE: 96 F | OXYGEN SATURATION: 98 % | BODY MASS INDEX: 23.57 KG/M2 | DIASTOLIC BLOOD PRESSURE: 62 MMHG | HEART RATE: 75 BPM | WEIGHT: 159.6 LBS | RESPIRATION RATE: 16 BRPM

## 2020-06-18 PROCEDURE — 99213 OFFICE O/P EST LOW 20 MIN: CPT | Performed by: INTERNAL MEDICINE

## 2020-06-18 NOTE — PROGRESS NOTES
[facility] CARDIOLOGY OFFICE FOLLOW-UP      Patient: Maia Cuellar  YOB: 1934  MRN: [id number]    Chief Complaint:  Chief Complaint   Patient presents with    Hypertension    Atrial Fibrillation     ekg 6/4/20       Subjective/HPI    The patient is followed in the cardiology office chronically for the following problems: CAD, CABG, ICM, HTN, HPL, PAD, mesenteric ischemia    The last encounter focused on the following: followup    Testing/hospitalizations/procedures performed since last encounter: hospital 6/1-6/6 with sepsis, afib RVR. Since the last encounter, the patient has been feeling better, less short of breath, no swelling. Past Medical History:   Diagnosis Date    Abnormal finding on EKG 6/13/2016    Atrial fibrillation (HCC) 1/22/2018    BPH (benign prostatic hypertrophy)     Cancer (HCC)     Congestive heart failure ([address] 1/24/2019    GERD (gastroesophageal reflux disease)     Hyperlipidemia     Hypertension     Left bundle branch block 6/13/2016    Osteoarthritis     Sinus bradycardia on ECG 6/13/2016    Type II or unspecified type diabetes mellitus without mention of complication, not stated as uncontrolled        Past Surgical History:   Procedure Laterality Date    ANGIOPLASTY  05/08/2018    percutaneous transluminal angioplasty of the SMA with stent implantation, [facility]    COLONOSCOPY  2016    CYSTOSCOPY  01/25/2019    DR Momin Sites    DE ESOPHAGOGASTRODUODENOSCOPY TRANSORAL DIAGNOSTIC N/A 6/5/2018    EGD ESOPHAGOGASTRODUODENOSCOPY performed by Jaswant Diggs MD at [place] [id number]  08/12/2016    left kidney, Dr. Raimundo Kendrick  01/2019       Family History   Problem Relation Age of Onset    Heart Attack Father     Heart Attack Brother        Social History     Socioeconomic History    Marital status:       Spouse name: None    Number of children: None    Years of education: None    tablet 1    latanoprost (XALATAN) 0.005 % ophthalmic solution Place 1 drop into both eyes nightly       No current facility-administered medications for this visit. Review of Systems:   Review of Systems   Constitutional: Negative for activity change and appetite change. HENT: Negative for congestion. Respiratory: Negative for apnea, choking and chest tightness. Cardiovascular: Negative for chest pain. Gastrointestinal: Negative for abdominal distention and abdominal pain. Endocrine: Negative for cold intolerance and heat intolerance. Genitourinary: Negative for dysuria and enuresis. Musculoskeletal: Negative for arthralgias and back pain. Skin: Negative for color change. Allergic/Immunologic: Negative. Neurological: Negative for dizziness, seizures, syncope and light-headedness. Psychiatric/Behavioral: Negative for agitation, behavioral problems and confusion. Physical Examination:    /62 (Site: Left Upper Arm, Position: Sitting, Cuff Size: Medium Adult)   Pulse 75   Temp 96 °F (35.6 °C) (Temporal)   Resp 16   Wt 159 lb 9.6 oz (72.4 kg)   SpO2 98%   BMI 23.57 kg/m²    Physical Exam   Constitutional: The patient appears healthy. No distress. HENT: Mouth/Throat: Oropharynx is clear. Eyes: Pupils are equal, round, and reactive to light. Neck: Normal range of motion. No JVD present. Cardiovascular: Regular rhythm, S1 normal, S2 normal, normal heart sounds and intact distal pulses. Exam reveals no gallop. No murmur heard. Pulses:       Radial pulses are 2+ on the right side. Dorsalis pedis pulses are 2+ on the right side. Pulmonary/Chest: Effort normal and breath sounds normal. No wheezes. No rales. No tenderness. Abdominal: Soft. Bowel sounds are normal.   Musculoskeletal: Normal range of motion. No edema. Neurological: The patient is alert and oriented to person, place, and time. Intact cranial nerves. Skin: Skin is warm and dry.  No rash noted.       LABS:  CBC:   Lab Results   Component Value Date    WBC 6.6 08/13/2019    WBC 6.2 01/24/2019    RBC 3.48 08/13/2019    RBC 2.89 03/08/2019    HGB 10.8 08/13/2019    HCT 32.6 08/13/2019    MCV 94 08/13/2019    MCH 32.2 03/08/2019    MCHC 33.1 08/13/2019    RDW 13.5 01/24/2019     08/13/2019    MPV 10.6 03/08/2019     Lipids:  Lab Results   Component Value Date    CHOL 164 01/02/2019    CHOL 143 06/29/2018    CHOL 136 04/19/2018     Lab Results   Component Value Date    TRIG 337 (H) 01/02/2019    TRIG 160 06/29/2018    TRIG 326 (H) 04/19/2018     Lab Results   Component Value Date    HDL 34 (L) 01/02/2019    HDL 32 (L) 06/29/2018    HDL 28 (L) 04/19/2018     Lab Results   Component Value Date    LDLCALC 63 01/02/2019    LDLCALC 79 06/29/2018    LDLCALC 43 04/19/2018     Lab Results   Component Value Date    VLDL 16 09/19/2017     Lab Results   Component Value Date    CHOLHDLRATIO 2.9 09/19/2017    CHOLHDLRATIO 4.7 06/21/2011     CMP:    Lab Results   Component Value Date     06/08/2020    K 4.2 06/08/2020    K 4.3 01/25/2018     06/08/2020    CO2 25 06/08/2020    BUN 70 06/08/2020    CREATININE 1.92 06/08/2020    GFRAA 40.4 06/08/2020    AGRATIO 1.3 03/05/2019    LABGLOM 33.4 06/08/2020    GLUCOSE 115 06/08/2020    GLUCOSE 109 02/28/2020    PROT 5.4 03/05/2019    LABALBU 3.1 03/05/2019    CALCIUM 8.8 06/08/2020    BILITOT 0.5 03/05/2019    ALKPHOS 58 03/05/2019    AST 15 03/05/2019    ALT 8 03/05/2019     BMP:    Lab Results   Component Value Date     06/08/2020    K 4.2 06/08/2020    K 4.3 01/25/2018     06/08/2020    CO2 25 06/08/2020    BUN 70 06/08/2020    LABALBU 3.1 03/05/2019    CREATININE 1.92 06/08/2020    CALCIUM 8.8 06/08/2020    GFRAA 40.4 06/08/2020    LABGLOM 33.4 06/08/2020    GLUCOSE 115 06/08/2020    GLUCOSE 109 02/28/2020     Magnesium:    Lab Results   Component Value Date    MG 2.2 03/07/2019     TSH:No results found for: TSHFT4, TSH    Patient Active Problem List   Diagnosis    Hypertension    Benign prostatic hyperplasia    GERD (gastroesophageal reflux disease)    Osteoarthritis    Type 2 diabetes mellitus without complication (HCC)    Hyperlipidemia    Bladder cancer (HCC)    Sinus bradycardia on ECG    Left bundle branch block    Abnormal finding on EKG    Primary bladder malignant neoplasm (HCC)    Hypogonadism in male    Atrial fibrillation (HCC)    Abdominal pain    Mesenteric ischemia (HCC)    Epigastric abdominal pain    Nausea    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    Urothelial carcinoma (HCC)    Chronic kidney disease, stage III (moderate) (HCC)    Congestive heart failure (HCC)       Medications:  Current Outpatient Medications   Medication Sig Dispense Refill    apixaban (ELIQUIS) 2.5 MG TABS tablet Take 2.5 mg by mouth 2 times daily      carvedilol (COREG) 3.125 MG tablet Take 3.125 mg by mouth 2 times daily (with meals)      tamsulosin (FLOMAX) 0.4 MG capsule Take 0.4 mg by mouth daily      simvastatin (ZOCOR) 20 MG tablet Take 1 tablet by mouth nightly 90 tablet 0    docusate sodium (COLACE) 100 MG capsule Take 100 mg by mouth 2 times daily      Multiple Vitamin (ONE-A-DAY ESSENTIAL PO) Take 1 tablet by mouth daily      Multiple Vitamins-Minerals (EYE VITAMINS) CAPS Take 1 capsule by mouth daily      omeprazole (PRILOSEC) 20 MG delayed release capsule Take 1 capsule by mouth daily 30 capsule 0    ondansetron (ZOFRAN) 4 MG tablet TAKE 1 TO 2 TABLETS EVERY 4 TO 6 HOURS AS NEEDED FOR NAUSEA AND VOMITING 30 tablet 1    latanoprost (XALATAN) 0.005 % ophthalmic solution Place 1 drop into both eyes nightly       No current facility-administered medications for this visit. Assessment/Plan:    1. Essential hypertension  Holding BP meds, continue coreg    2. Other persistent atrial fibrillation  NOAC, rate control    3.  Sinus bradycardia on ECG         Counseling: the patient was counseled regarding diet, exercise, weight control and heart healthy lifestyle. Return in about 2 weeks (around 7/2/2020) for followup cv disease, okay for urology procedure, ok to hold xarelto. Electronically signed by   Esperanza Petit.  Astrid Davis MD [facility] Director of Cardiology Services and Cardiac Catheterization Laboratory  [facility], [place]    on 6/18/2020 at 2:05 PM

## 2020-07-02 ENCOUNTER — OFFICE VISIT (OUTPATIENT)
Dept: CARDIOLOGY CLINIC | Age: 85
End: 2020-07-02
Payer: MEDICARE

## 2020-07-02 ENCOUNTER — TELEPHONE (OUTPATIENT)
Dept: CARDIOLOGY CLINIC | Age: 85
End: 2020-07-02

## 2020-07-02 VITALS
OXYGEN SATURATION: 98 % | SYSTOLIC BLOOD PRESSURE: 94 MMHG | DIASTOLIC BLOOD PRESSURE: 60 MMHG | BODY MASS INDEX: 23.57 KG/M2 | HEART RATE: 82 BPM | WEIGHT: 159.6 LBS | RESPIRATION RATE: 14 BRPM

## 2020-07-02 PROCEDURE — 82948 REAGENT STRIP/BLOOD GLUCOSE: CPT | Performed by: INTERNAL MEDICINE

## 2020-07-02 PROCEDURE — 99213 OFFICE O/P EST LOW 20 MIN: CPT | Performed by: INTERNAL MEDICINE

## 2020-07-02 NOTE — PROGRESS NOTES
Occupational History    None   Social Needs    Financial resource strain: None    Food insecurity     Worry: None     Inability: None    Transportation needs     Medical: None     Non-medical: None   Tobacco Use    Smoking status: Never Smoker    Smokeless tobacco: Never Used   Substance and Sexual Activity    Alcohol use: Yes     Comment: Rarely beer    Drug use: No    Sexual activity: None   Lifestyle    Physical activity     Days per week: None     Minutes per session: None    Stress: None   Relationships    Social connections     Talks on phone: None     Gets together: None     Attends Anabaptism service: None     Active member of club or organization: None     Attends meetings of clubs or organizations: None     Relationship status: None    Intimate partner violence     Fear of current or ex partner: None     Emotionally abused: None     Physically abused: None     Forced sexual activity: None   Other Topics Concern    None   Social History Narrative    None       Allergies   Allergen Reactions    Morphine      Other reaction(s): Delirium       Current Outpatient Medications   Medication Sig Dispense Refill    apixaban (ELIQUIS) 2.5 MG TABS tablet Take 2.5 mg by mouth 2 times daily      tamsulosin (FLOMAX) 0.4 MG capsule Take 0.4 mg by mouth daily      simvastatin (ZOCOR) 20 MG tablet Take 1 tablet by mouth nightly 90 tablet 0    docusate sodium (COLACE) 100 MG capsule Take 100 mg by mouth daily       Multiple Vitamins-Minerals (EYE VITAMINS) CAPS Take 1 capsule by mouth daily      omeprazole (PRILOSEC) 20 MG delayed release capsule Take 1 capsule by mouth daily 30 capsule 0    ondansetron (ZOFRAN) 4 MG tablet TAKE 1 TO 2 TABLETS EVERY 4 TO 6 HOURS AS NEEDED FOR NAUSEA AND VOMITING 30 tablet 1    latanoprost (XALATAN) 0.005 % ophthalmic solution Place 1 drop into both eyes nightly      midodrine (PROAMATINE) 2.5 MG tablet Take 1 tablet by mouth 3 times daily 90 tablet 3     No current facility-administered medications for this visit. Review of Systems:   Review of Systems   Constitutional: Negative for activity change and appetite change. HENT: Negative for congestion. Respiratory: Negative for apnea, choking and chest tightness. Cardiovascular: Negative for chest pain. Gastrointestinal: Negative for abdominal distention and abdominal pain. Endocrine: Negative for cold intolerance and heat intolerance. Genitourinary: Negative for dysuria and enuresis. Musculoskeletal: Negative for arthralgias and back pain. Skin: Negative for color change. Allergic/Immunologic: Negative. Neurological: Negative for dizziness, seizures, syncope and light-headedness. Psychiatric/Behavioral: Negative for agitation, behavioral problems and confusion. Physical Examination:    BP 94/60 (Position: Standing)   Pulse 82   Resp 14   Wt 159 lb 9.6 oz (72.4 kg)   SpO2 98%   BMI 23.57 kg/m²    Physical Exam   Constitutional: The patient appears healthy. No distress. HENT: Mouth/Throat: Oropharynx is clear. Eyes: Pupils are equal, round, and reactive to light. Neck: Normal range of motion. No JVD present. Cardiovascular: Regular rhythm, S1 normal, S2 normal, normal heart sounds and intact distal pulses. Exam reveals no gallop. No murmur heard. Pulses:       Radial pulses are 2+ on the right side. Dorsalis pedis pulses are 2+ on the right side. Pulmonary/Chest: Effort normal and breath sounds normal. No wheezes. No rales. No tenderness. Abdominal: Soft. Bowel sounds are normal.   Musculoskeletal: Normal range of motion. No edema. Neurological: The patient is alert and oriented to person, place, and time. Intact cranial nerves. Skin: Skin is warm and dry. No rash noted.        LABS:  CBC:   Lab Results   Component Value Date    WBC 12.5 06/04/2020    RBC 3.12 06/04/2020    RBC 2.89 03/08/2019    HGB 9.7 06/04/2020    HCT 28.6 06/04/2020    MCV 92 06/04/2020 MCH 32.2 03/08/2019    MCHC 33.9 06/04/2020    RDW 13.5 01/24/2019     06/04/2020    MPV 10.6 03/08/2019     Lipids:  Lab Results   Component Value Date    CHOL 164 01/02/2019    CHOL 143 06/29/2018    CHOL 136 04/19/2018     Lab Results   Component Value Date    TRIG 337 (H) 01/02/2019    TRIG 160 06/29/2018    TRIG 326 (H) 04/19/2018     Lab Results   Component Value Date    HDL 34 (L) 01/02/2019    HDL 32 (L) 06/29/2018    HDL 28 (L) 04/19/2018     Lab Results   Component Value Date    LDLCALC 63 01/02/2019    LDLCALC 79 06/29/2018    LDLCALC 43 04/19/2018     Lab Results   Component Value Date    VLDL 16 09/19/2017     Lab Results   Component Value Date    CHOLHDLRATIO 2.9 09/19/2017    CHOLHDLRATIO 4.7 06/21/2011     CMP:    Lab Results   Component Value Date     07/21/2020    K 3.9 07/21/2020    K 4.3 01/25/2018    CL 98 07/21/2020    CO2 25 07/21/2020    BUN 25 07/21/2020    CREATININE 1.89 07/21/2020    GFRAA 41.1 07/21/2020    AGRATIO 1.3 03/05/2019    LABGLOM 34.0 07/21/2020    GLUCOSE 111 07/21/2020    GLUCOSE 109 02/28/2020    PROT 5.4 03/05/2019    LABALBU 3.1 03/05/2019    CALCIUM 9.0 07/21/2020    BILITOT 0.5 03/05/2019    ALKPHOS 58 03/05/2019    AST 15 03/05/2019    ALT 8 03/05/2019     BMP:    Lab Results   Component Value Date     07/21/2020    K 3.9 07/21/2020    K 4.3 01/25/2018    CL 98 07/21/2020    CO2 25 07/21/2020    BUN 25 07/21/2020    LABALBU 3.1 03/05/2019    CREATININE 1.89 07/21/2020    CALCIUM 9.0 07/21/2020    GFRAA 41.1 07/21/2020    LABGLOM 34.0 07/21/2020    GLUCOSE 111 07/21/2020    GLUCOSE 109 02/28/2020     Magnesium:    Lab Results   Component Value Date    MG 2.2 03/07/2019     TSH:No results found for: TSHFT4, TSH    Patient Active Problem List   Diagnosis    Hypertension    Benign prostatic hyperplasia    GERD (gastroesophageal reflux disease)    Osteoarthritis    Type 2 diabetes mellitus without complication (HCC)    Hyperlipidemia    Bladder cancer ([facility]    Sinus bradycardia on ECG    Left bundle branch block    Abnormal finding on EKG    Primary bladder malignant neoplasm (HCC)    Hypogonadism in male    Atrial fibrillation (HCC)    Abdominal pain    Mesenteric ischemia (HCC)    Epigastric abdominal pain    Nausea    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    Urothelial carcinoma (HCC)    Chronic kidney disease, stage III (moderate) (HCC)    Congestive heart failure (HCC)       Medications:  Current Outpatient Medications   Medication Sig Dispense Refill    apixaban (ELIQUIS) 2.5 MG TABS tablet Take 2.5 mg by mouth 2 times daily      tamsulosin (FLOMAX) 0.4 MG capsule Take 0.4 mg by mouth daily      simvastatin (ZOCOR) 20 MG tablet Take 1 tablet by mouth nightly 90 tablet 0    docusate sodium (COLACE) 100 MG capsule Take 100 mg by mouth daily       Multiple Vitamins-Minerals (EYE VITAMINS) CAPS Take 1 capsule by mouth daily      omeprazole (PRILOSEC) 20 MG delayed release capsule Take 1 capsule by mouth daily 30 capsule 0    ondansetron (ZOFRAN) 4 MG tablet TAKE 1 TO 2 TABLETS EVERY 4 TO 6 HOURS AS NEEDED FOR NAUSEA AND VOMITING 30 tablet 1    latanoprost (XALATAN) 0.005 % ophthalmic solution Place 1 drop into both eyes nightly      midodrine (PROAMATINE) 2.5 MG tablet Take 1 tablet by mouth 3 times daily 90 tablet 3     No current facility-administered medications for this visit. Assessment/Plan:    1. Essential hypertension      2. Other persistent atrial fibrillation      3. Congestive heart failure, unspecified HF chronicity, unspecified heart failure type ([facility]      4. Sinus bradycardia on ECG      5. Shakiness    - POC Glucose Fingerstick    6. Weakness    - POC Glucose Fingerstick     Holding BP meds    Counseling: the patient was counseled regarding diet, exercise, weight control and heart healthy lifestyle. Return in about 2 weeks (around 7/16/2020). Electronically signed by   Vera Colon.  Janeth Santiago MD [place] Director of Cardiology Services and Cardiac Catheterization Laboratory  [facility], [place]    on 7/27/2020 at 7:51 AM

## 2020-07-09 ENCOUNTER — OFFICE VISIT (OUTPATIENT)
Dept: CARDIOLOGY CLINIC | Age: 85
End: 2020-07-09
Payer: MEDICARE

## 2020-07-09 VITALS
DIASTOLIC BLOOD PRESSURE: 80 MMHG | RESPIRATION RATE: 18 BRPM | BODY MASS INDEX: 23.85 KG/M2 | SYSTOLIC BLOOD PRESSURE: 141 MMHG | HEART RATE: 76 BPM | WEIGHT: 161 LBS | OXYGEN SATURATION: 98 % | HEIGHT: 69 IN

## 2020-07-09 DIAGNOSIS — Z95.810 HISTORY OF AUTOMATIC INTERNAL CARDIAC DEFIBRILLATOR (AICD): ICD-10-CM

## 2020-07-09 DIAGNOSIS — I42.9 CARDIOMYOPATHY, UNSPECIFIED TYPE (HCC): ICD-10-CM

## 2020-07-09 LAB
ANION GAP SERPL CALCULATED.3IONS-SCNC: 9 MEQ/L (ref 9–15)
BUN BLDV-MCNC: 24 MG/DL (ref 8–23)
CALCIUM SERPL-MCNC: 9.3 MG/DL (ref 8.5–9.9)
CHLORIDE BLD-SCNC: 100 MEQ/L (ref 95–107)
CO2: 25 MEQ/L (ref 20–31)
CREAT SERPL-MCNC: 1.36 MG/DL (ref 0.7–1.2)
GFR AFRICAN AMERICAN: >60
GFR NON-AFRICAN AMERICAN: 49.7
GLUCOSE BLD-MCNC: 108 MG/DL (ref 70–99)
POTASSIUM SERPL-SCNC: 4.4 MEQ/L (ref 3.4–4.9)
PRO-BNP: 4095 PG/ML
SODIUM BLD-SCNC: 134 MEQ/L (ref 135–144)

## 2020-07-09 PROCEDURE — 99215 OFFICE O/P EST HI 40 MIN: CPT | Performed by: PHYSICIAN ASSISTANT

## 2020-07-09 RX ORDER — FUROSEMIDE 20 MG/1
20 TABLET ORAL DAILY
Qty: 30 TABLET | Refills: 5 | Status: SHIPPED
Start: 2020-07-09 | End: 2020-07-21 | Stop reason: HOSPADM

## 2020-07-09 RX ORDER — CARVEDILOL 3.12 MG/1
3.12 TABLET ORAL DAILY
Qty: 30 TABLET | Refills: 5 | Status: SHIPPED
Start: 2020-07-09 | End: 2020-07-21 | Stop reason: HOSPADM

## 2020-07-09 ASSESSMENT — ENCOUNTER SYMPTOMS
COLOR CHANGE: 0
SHORTNESS OF BREATH: 0
VOMITING: 0
CHEST TIGHTNESS: 0
NAUSEA: 0
BLOOD IN STOOL: 0

## 2020-07-09 NOTE — PROGRESS NOTES
Patient: Marlon Wade  YOB: 1934  MRN: [id number]    Chief Complaint:  Chief Complaint   Patient presents with    Congestive Heart Failure    Shortness of Breath    Fatigue         Subjective/HPI       7/9/20 CHF clinic visit #1: This is a very pleasant 66-year-old  male with past medical history significant for chronic systolic congestive heart failure, cardiomyopathy with EF of 30 to 35% per last echocardiogram in 2018, history of AICD placement, history of A. fib on oral anticoagulation with Eliquis, chronic kidney disease with history of left nephrectomy due to renal cancer presents for initial CHF clinic evaluation following referral by Dr. Carlene Lopez. Patient states that he had been having significant fatigue and tiredness anytime he would try to exert himself and states that his blood pressure had been running low. On office visit with Dr. Yovany Min on 6/8/2020, patient's Entresto, Lasix and Spironolactone were discontinued due to low blood pressure with reported home readings of systolic blood pressure in the 70s. He was again seen by Dr. Carlene Lopez on 6/18/2020 prior to alert urologic procedure and he was okayed to hold his Xarelto prior to this procedure and no other medication adjustments were made at that time. His blood pressure was stable at his office visit on 6/18/2020 with blood pressure of 116/62. He was then evaluated by Dr. Carlene Lopez again on 7/2/2020 at which time he was referred to the CHF clinic. Patient states that since these medications were discontinued, he has been feeling better overall. He states that the last 2 days have been the best days for him. He denies any significant shortness of breath, chest pain, palpitations, diaphoresis, paroxysmal nocturnal dyspnea, orthopnea, lower extremity edema, syncope, fever or chills.   He continues to experience some fatigue and tiredness with exertion but states that this has overall improved since his blood pressure has improved. He is not currently weighing himself daily at home but has been advised to start doing so. Educated the patient extensively on importance of diet modification including low-sodium diet and fluid restriction. Blood pressure in office today is stable at 141/80. We will plan to resume low-dose Coreg 3.125 mg p.o. twice daily and Lasix 20 mg p.o. daily to further optimize heart failure medications. Over the past month since his previous meds were discontinued he is only been taking Eliquis 2.5 mg p.o. twice daily and Zocor 20 mg p.o. daily. Recent labs reviewed and documented below. BMP from 6/8/2020: Sodium 138, potassium 4.2, chloride 102, total CO2 25, BUN elevated 70, creatinine elevated 1.92, GFR low at 33.4, glucose 115  proBNP 6/8/2020: 2476    Vital signs stable in office today with blood pressure 141/80, heart rate 76, pulse ox 98% on room air. Weight is 161 pounds. Echocardiogram 9/13/18:  Conclusions   Summary   LV is moderately dilated   LVEF is severely depressed with EF 30-35%   E/A flow reversal noted. Suggestive of diastolic dysfunction. Signature   ----------------------------------------------------------------   Electronically signed by Ralph Israel MD(Interpreting   physician) on 09/13/2018 11:45 AM   ----------------------------------------------------------------   Findings  Left Ventricle  LV is moderately dilated  LVEF is severely depressed with EF 30-35%  E/A flow reversal noted. Suggestive of diastolic dysfunction. Right Ventricle  Normal right ventricular size with preserved RV function. Left Atrium  Moderately dilated left atrium. Right Atrium  Normal right atrial dimension with no evidence of thrombus or mass noted. Mitral Valve  Mitral annular calcification is present. Mild MR  Tricuspid Valve  Normal structure with Mild tricuspid valvular regurgitation noted.   Aortic Valve  Aortic valve leaflets are moderately thickened. Trace AR  Pulmonic Valve  Mild NV  Pericardial Effusion  No evidence of significant pericardial effusion is noted. Pleural Effusion  No evidence of pleural effusion. Aorta \ Miscellaneous  The aorta is within normal limits. Aortic root dimension within normal limits. PMHx:  -Hx of superior mesenteric artery stenosis s/p PTA/stent in [place]/May 2018  -CMP EF 30-35%  -Hx AICD  -Trivial CAD per [facility] 12/8/17  -CKD  -Renal CA s/p left nephrectomy around 2014  -Dyslipidemia  -Recurrent UTI  -Hx A-fib on [address] [place] [address] Road with Eliquis    PSHx:  -Left nephrectomy for CA    Social Hx:  Nonsmoker  Social alcohol  No drugs    Family Hx:  -Father with \"heart problems\"    Allergies   Allergen Reactions    Morphine      Other reaction(s): Delirium       Current Outpatient Medications   Medication Sig Dispense Refill    carvedilol (COREG) 3.125 MG tablet Take 1 tablet by mouth daily 30 tablet 5    furosemide (LASIX) 20 MG tablet Take 1 tablet by mouth daily 30 tablet 5    apixaban (ELIQUIS) 2.5 MG TABS tablet Take 2.5 mg by mouth 2 times daily      tamsulosin (FLOMAX) 0.4 MG capsule Take 0.4 mg by mouth daily      simvastatin (ZOCOR) 20 MG tablet Take 1 tablet by mouth nightly 90 tablet 0    docusate sodium (COLACE) 100 MG capsule Take 100 mg by mouth daily       Multiple Vitamins-Minerals (EYE VITAMINS) CAPS Take 1 capsule by mouth daily      omeprazole (PRILOSEC) 20 MG delayed release capsule Take 1 capsule by mouth daily 30 capsule 0    ondansetron (ZOFRAN) 4 MG tablet TAKE 1 TO 2 TABLETS EVERY 4 TO 6 HOURS AS NEEDED FOR NAUSEA AND VOMITING 30 tablet 1    latanoprost (XALATAN) 0.005 % ophthalmic solution Place 1 drop into both eyes nightly       No current facility-administered medications for this visit.         Past Medical History:   Diagnosis Date    Abnormal finding on EKG 6/13/2016    Atrial fibrillation (HCC) 1/22/2018    BPH (benign prostatic hypertrophy)     Cancer (HCC)     Congestive heart failure Concern    None   Social History Narrative    None       Family History   Problem Relation Age of Onset    Heart Attack Father     Heart Attack Brother          Review of Systems:   Review of Systems   Constitutional: Positive for fatigue (with exertion). Negative for activity change, chills, fever and unexpected weight change. HENT: Negative for congestion. Respiratory: Negative for chest tightness and shortness of breath. Cardiovascular: Negative for chest pain, palpitations and leg swelling. Gastrointestinal: Negative for blood in stool, nausea and vomiting. Genitourinary: Negative for difficulty urinating, dysuria and hematuria. Musculoskeletal: Negative for arthralgias and myalgias. Skin: Negative for color change, pallor and rash. Neurological: Negative for dizziness, syncope and light-headedness. Psychiatric/Behavioral: Negative for agitation and behavioral problems. Physical Examination:    BP (!) 141/80 (Site: Left Upper Arm, Position: Sitting, Cuff Size: Small Adult)   Pulse 76   Resp 18   Ht 5' 9\" (1.753 m)   Wt 161 lb (73 kg)   SpO2 98%   BMI 23.78 kg/m²    Physical Exam  Constitutional:       General: He is not in acute distress. Appearance: Normal appearance. HENT:      Head: Normocephalic and atraumatic. Neck:      Musculoskeletal: Normal range of motion and neck supple. Cardiovascular:      Rate and Rhythm: Normal rate and regular rhythm. Pulmonary:      Effort: Pulmonary effort is normal. No respiratory distress. Breath sounds: Normal breath sounds. No wheezing. Comments: Decreased breath sounds with faint crackles at bases  Abdominal:      General: Bowel sounds are normal.      Palpations: Abdomen is soft. Tenderness: There is no abdominal tenderness. Musculoskeletal: Normal range of motion. Right lower leg: No edema. Left lower leg: No edema. Skin:     General: Skin is warm and dry.    Neurological:      General: No focal deficit present. Mental Status: He is alert and oriented to person, place, and time. Cranial Nerves: No cranial nerve deficit.    Psychiatric:         Mood and Affect: Mood normal.         Behavior: Behavior normal.         LABS:  CBC:   Lab Results   Component Value Date    WBC 6.6 08/13/2019    WBC 6.2 01/24/2019    RBC 3.48 08/13/2019    RBC 2.89 03/08/2019    HGB 10.8 08/13/2019    HCT 32.6 08/13/2019    MCV 94 08/13/2019    MCH 32.2 03/08/2019    MCHC 33.1 08/13/2019    RDW 13.5 01/24/2019     08/13/2019    MPV 10.6 03/08/2019     Lipids:  Lab Results   Component Value Date    CHOL 164 01/02/2019    CHOL 143 06/29/2018    CHOL 136 04/19/2018     Lab Results   Component Value Date    TRIG 337 (H) 01/02/2019    TRIG 160 06/29/2018    TRIG 326 (H) 04/19/2018     Lab Results   Component Value Date    HDL 34 (L) 01/02/2019    HDL 32 (L) 06/29/2018    HDL 28 (L) 04/19/2018     Lab Results   Component Value Date    LDLCALC 63 01/02/2019    LDLCALC 79 06/29/2018    LDLCALC 43 04/19/2018     Lab Results   Component Value Date    VLDL 16 09/19/2017     Lab Results   Component Value Date    CHOLHDLRATIO 2.9 09/19/2017    CHOLHDLRATIO 4.7 06/21/2011     CMP:    Lab Results   Component Value Date     07/09/2020    K 4.4 07/09/2020    K 4.3 01/25/2018     07/09/2020    CO2 25 07/09/2020    BUN 24 07/09/2020    CREATININE 1.36 07/09/2020    GFRAA >60.0 07/09/2020    AGRATIO 1.3 03/05/2019    LABGLOM 49.7 07/09/2020    GLUCOSE 108 07/09/2020    GLUCOSE 109 02/28/2020    PROT 5.4 03/05/2019    LABALBU 3.1 03/05/2019    CALCIUM 9.3 07/09/2020    BILITOT 0.5 03/05/2019    ALKPHOS 58 03/05/2019    AST 15 03/05/2019    ALT 8 03/05/2019     BMP:    Lab Results   Component Value Date     07/09/2020    K 4.4 07/09/2020    K 4.3 01/25/2018     07/09/2020    CO2 25 07/09/2020    BUN 24 07/09/2020    LABALBU 3.1 03/05/2019    CREATININE 1.36 07/09/2020    CALCIUM 9.3 07/09/2020    GFRAA >60.0 07/09/2020    LABGLOM 49.7 07/09/2020    GLUCOSE 108 07/09/2020    GLUCOSE 109 02/28/2020     Magnesium:    Lab Results   Component Value Date    MG 2.2 03/07/2019     TSH:No results found for: TSHFT4, TSH  .result  Recent Labs     07/09/20  1041   PROBNP 4,095     No results for input(s): INR in the last 72 hours. Patient Active Problem List   Diagnosis    Hypertension    Benign prostatic hyperplasia    GERD (gastroesophageal reflux disease)    Osteoarthritis    Type 2 diabetes mellitus without complication (HCC)    Hyperlipidemia    Bladder cancer (HCC)    Sinus bradycardia on ECG    Left bundle branch block    Abnormal finding on EKG    Primary bladder malignant neoplasm ([facility] [address]    Hypogonadism in male    Atrial fibrillation (HCC)    Abdominal pain    Mesenteric ischemia (HCC)    Epigastric abdominal pain    Nausea    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    Urothelial carcinoma (HCC)    Chronic kidney disease, stage III (moderate) (HCC)    Congestive heart failure (HCC)       Assessment/Plan:     Diagnosis Orders   1. Chronic systolic CHF (congestive heart failure), NYHA class 3 ([facility])      stable   2. Cardiomyopathy, unspecified type [facility])  Basic Metabolic Panel    Brain Natriuretic Peptide    s/p [facility] in 2017 with trivial CAD   3. History of hypotension      BP improved today. entresto/lasix and aldactone discontinued by [facility] 6/8/20 due to low BP and fatigue complaints   4. History of automatic internal cardiac defibrillator (AICD)  Basic Metabolic Panel    Brain Natriuretic Peptide    Follows with Dr. Jennifer Wiggins   5. PAD (peripheral artery disease) ([facility])      history of SMA stent and LE PVI   6. History of atrial fibrillation      on [address] with eliquis     7. Chronic kidney disease, unspecified CKD stage     8.  History of left nephrectomy      for renal CA       -Maximize medical therapy--resume low dose Coreg 3.125 mg p.o. twice daily and Lasix 20 mg p.o. daily as blood pressure now much improved, continue oral anticoagulation with Xarelto 2.5 mg p.o. twice daily, continue Zocor 20 mg p.o. daily  -Consider resumption of Entresto in future if blood pressure and renal function remain stable  -Check BMP today to monitor renal function and electrolytes. Recent BMP from 6/8/2020 showed creatinine of 1.9 which had worsened compared to prior  -Check BNP today  -We will try to obtain recent records from [address] [place] [address] where patient was reportedly hospitalized for urinary tract infection and A. fib RVR in early June. We will see if patient has more recent echo than 2018.  -Cardiac/<2 gram sodium diet recommended  -1500-1800mL fluid restriction   -Instructed patient to weigh self daily every morning upon waking and keep log book of daily weights. Notify office if gaining more than 3 pounds in 48 hours--patient not currently weighing himself daily but states that he will start doing so now  -Advised patient to check blood pressure twice daily in a.m. and p.m. and keep a log of blood pressure trends to bring to next visit. Has been advised to notify office if recurrent hypotension following resumption of Coreg and Lasix  -Advised patient to notify office immediately if experiencing any progressive SOB, orthopnea, PND, LE edema or weight gain  -Educated patient on importance of fluid and salt restriction as well as lifestyle modification--admits to eating foods that are high in sodium content including frequent bologna sandwiches. States he will make more effort to minimize his sodium intake  -Maintain routine outpatient follow-up with general cardiologist, Dr. Trung Murrell  -Maintain routine outpatient follow-up with PCP--states he has follow-up appointment in September. If pruritic circular lesion on dorsal aspect of foot persists or worsens, he has been advised to contact his PCP  -F/U with CHF clinic in 2 weeks or sooner if needed        Counseling:   The importance of daily weights,

## 2020-07-16 ENCOUNTER — TELEPHONE (OUTPATIENT)
Dept: CARDIOLOGY CLINIC | Age: 85
End: 2020-07-16

## 2020-07-17 NOTE — TELEPHONE ENCOUNTER
Called patient to get update on if patient is symptomatic from hypotension. Patient states he is very weak and tired and can not get out of bed. He also states he has pain in both calves and can hardly walk. Jonathan YOON aware and advises ER for evaluation. Patient aware and verbalizes understanding.

## 2020-07-21 ENCOUNTER — TELEPHONE (OUTPATIENT)
Dept: CARDIOLOGY CLINIC | Age: 85
End: 2020-07-21

## 2020-07-21 ENCOUNTER — OFFICE VISIT (OUTPATIENT)
Dept: CARDIOLOGY CLINIC | Age: 85
End: 2020-07-21
Payer: MEDICARE

## 2020-07-21 VITALS
DIASTOLIC BLOOD PRESSURE: 57 MMHG | RESPIRATION RATE: 18 BRPM | HEIGHT: 69 IN | HEART RATE: 83 BPM | OXYGEN SATURATION: 99 % | BODY MASS INDEX: 23.25 KG/M2 | SYSTOLIC BLOOD PRESSURE: 83 MMHG | WEIGHT: 157 LBS

## 2020-07-21 DIAGNOSIS — N17.9 ACUTE KIDNEY INJURY SUPERIMPOSED ON CKD (HCC): ICD-10-CM

## 2020-07-21 DIAGNOSIS — I50.22 CHRONIC SYSTOLIC CHF (CONGESTIVE HEART FAILURE), NYHA CLASS 3 (HCC): ICD-10-CM

## 2020-07-21 DIAGNOSIS — N18.9 ACUTE KIDNEY INJURY SUPERIMPOSED ON CKD (HCC): ICD-10-CM

## 2020-07-21 LAB
ANION GAP SERPL CALCULATED.3IONS-SCNC: 15 MEQ/L (ref 9–15)
BUN BLDV-MCNC: 25 MG/DL (ref 8–23)
CALCIUM SERPL-MCNC: 9 MG/DL (ref 8.5–9.9)
CHLORIDE BLD-SCNC: 98 MEQ/L (ref 95–107)
CO2: 25 MEQ/L (ref 20–31)
CREAT SERPL-MCNC: 1.89 MG/DL (ref 0.7–1.2)
GFR AFRICAN AMERICAN: 41.1
GFR NON-AFRICAN AMERICAN: 34
GLUCOSE BLD-MCNC: 111 MG/DL (ref 70–99)
POTASSIUM SERPL-SCNC: 3.9 MEQ/L (ref 3.4–4.9)
SODIUM BLD-SCNC: 138 MEQ/L (ref 135–144)

## 2020-07-21 PROCEDURE — 99214 OFFICE O/P EST MOD 30 MIN: CPT | Performed by: PHYSICIAN ASSISTANT

## 2020-07-21 RX ORDER — MIDODRINE HYDROCHLORIDE 2.5 MG/1
2.5 TABLET ORAL 3 TIMES DAILY
Qty: 90 TABLET | Refills: 3 | Status: SHIPPED | OUTPATIENT
Start: 2020-07-21 | End: 2020-12-03

## 2020-07-21 ASSESSMENT — ENCOUNTER SYMPTOMS
SHORTNESS OF BREATH: 1
BLOOD IN STOOL: 0
VOMITING: 0
CHEST TIGHTNESS: 1
NAUSEA: 0
COLOR CHANGE: 0

## 2020-07-21 NOTE — PROGRESS NOTES
Patient: Torsten Del Toro  YOB: 1934  MRN: [id number]    Chief Complaint:  Chief Complaint   Patient presents with    Congestive Heart Failure     systolic    Shortness of Breath    Leg Pain    Fatigue     weakness         Subjective/HPI     7/21/20: Patient is here for follow-up of chronic systolic congestive heart failure. Was initially evaluated by me on 7/9/2020 following referral by Dr. Luis Fernando Small. Previously had issues with hypotension and was discontinued on the majority of his cardiac medications however during his initial evaluation with me his blood pressure had normalized and he was resumed on low-dose carvedilol and Lasix. 1 week following this initial office visit, he called the office complaining of recurrent hypotension with systolic in the 57T to 42L range and was advised to discontinue his carvedilol and hold Lasix for 3 days. He was also advised ER as he was complaining of significant fatigue and tiredness with inability to get out of bed and lower extremity pain. He presented to [place] [id number] on 7/17/2020 for further evaluation of low blood pressure and weakness complaints. On presentation to ER, he was normotensive with blood pressure of 129/76, heart rate 74, respiratory rate 18, pulse ox of 98%, afebrile with a temperature of 98.2 °F. WBC 6.2, hemoglobin 11.3, hematocrit 33.3, platelets 375. Sodium 139, potassium 3.9, chloride 104, total CO2 24, BUN elevated at 31, creatinine elevated 2.15, GFR low at 29, glucose 117. BNP 1096. Troponin negative. EKG showed sinus rhythm with ventricular rate of 79 bpm with left bundle branch block. Chest x-ray revealed no acute cardiopulmonary process. He was felt to have symptoms of claudication given his complaints of bilateral lower extremity pain and cramping with ambulation and was referred to vascular surgery as outpatient.   As patient's ER evaluation was fairly unremarkable, he was subsequently discharged home. Since his discharge from [facility] ER on 7/17/2020, patient has continued to feel unwell with complaints of fatigue and tiredness especially during ambulation. Also he continues to experience pain and cramping in his lower extremities with ambulation. He is extremely confused on what medications he is currently taking and did not bring his medication bottles or list with him to the office today. He states he previously had a friend who is helping him manage his medications however she has been sick recently and unable to do so. Concern is that patient is accidentally taking more medicines than he realizes which is contributing to his hypotension and dizziness and weakness complaints. Blood pressure low in office today at 86/55 on the left and 83/57 on the right. Repeat blood pressure completed manually by me was 100/60 on the right while seated but systolic dropping into the 70s upon standing. Heart rate 83, pulse ox 99% on room air. Weight is 157 pounds compared to 161 pounds at last visit. EKG 7/17/2020 [facility]: Normal sinus rhythm, 79 bpm, left bundle branch block, T wave inversions in leads II, 3, aVF and poor R wave progression in V3 through V6,  ms      Recent lab work reviewed and documented below.   [facility]: Sodium 139, potassium 3.9, chloride 104, total CO2 24, BUN elevated 31, creatinine elevated 2.15, GFR low at 29, glucose 117  BMP from 7/9/2020: Sodium 134, potassium 4.4, chloride 100, total CO2 25, BUN elevated 24, creatinine elevated 1.36, GFR low at 49.7, glucose 108  proBNP 7/9/2020: 4095        7/9/20 CHF clinic visit #1: This is a very pleasant 66-year-old  male with past medical history significant for chronic systolic congestive heart failure, cardiomyopathy with EF of 30 to 35% per last echocardiogram in 2018, history of AICD placement, history of A. fib on oral anticoagulation with Eliquis, chronic kidney disease with history of left nephrectomy due to renal cancer presents for initial CHF clinic evaluation following referral by Dr. Carlene Lopez. Patient states that he had been having significant fatigue and tiredness anytime he would try to exert himself and states that his blood pressure had been running low. On office visit with Dr. Yovany Min on 6/8/2020, patient's Entresto, Lasix and Spironolactone were discontinued due to low blood pressure with reported home readings of systolic blood pressure in the 70s. He was again seen by Dr. Carlene Lopez on 6/18/2020 prior to alert urologic procedure and he was okayed to hold his Xarelto prior to this procedure and no other medication adjustments were made at that time. His blood pressure was stable at his office visit on 6/18/2020 with blood pressure of 116/62. He was then evaluated by Dr. Carlene Lopez again on 7/2/2020 at which time he was referred to the CHF clinic. Patient states that since these medications were discontinued, he has been feeling better overall. He states that the last 2 days have been the best days for him. He denies any significant shortness of breath, chest pain, palpitations, diaphoresis, paroxysmal nocturnal dyspnea, orthopnea, lower extremity edema, syncope, fever or chills. He continues to experience some fatigue and tiredness with exertion but states that this has overall improved since his blood pressure has improved. He is not currently weighing himself daily at home but has been advised to start doing so. Educated the patient extensively on importance of diet modification including low-sodium diet and fluid restriction. Blood pressure in office today is stable at 141/80. We will plan to resume low-dose Coreg 3.125 mg p.o. twice daily and Lasix 20 mg p.o. daily to further optimize heart failure medications.   Over the past month since his previous meds were discontinued he is only been taking Eliquis 2.5 mg p.o. twice daily and Zocor 20 mg p.o. daily. Recent labs reviewed and documented below. BMP from 6/8/2020: Sodium 138, potassium 4.2, chloride 102, total CO2 25, BUN elevated 70, creatinine elevated 1.92, GFR low at 33.4, glucose 115  proBNP 6/8/2020: 2476    Vital signs stable in office today with blood pressure 141/80, heart rate 76, pulse ox 98% on room air. Weight is 161 pounds. Echocardiogram 9/13/18:  Conclusions   Summary   LV is moderately dilated   LVEF is severely depressed with EF 30-35%   E/A flow reversal noted. Suggestive of diastolic dysfunction. Signature   ----------------------------------------------------------------   Electronically signed by Lang Garrett MD(Interpreting   physician) on 09/13/2018 11:45 AM   ----------------------------------------------------------------   Findings  Left Ventricle  LV is moderately dilated  LVEF is severely depressed with EF 30-35%  E/A flow reversal noted. Suggestive of diastolic dysfunction. Right Ventricle  Normal right ventricular size with preserved RV function. Left Atrium  Moderately dilated left atrium. Right Atrium  Normal right atrial dimension with no evidence of thrombus or mass noted. Mitral Valve  Mitral annular calcification is present. Mild MR  Tricuspid Valve  Normal structure with Mild tricuspid valvular regurgitation noted. Aortic Valve  Aortic valve leaflets are moderately thickened. Trace AR  Pulmonic Valve  Mild TN  Pericardial Effusion  No evidence of significant pericardial effusion is noted. Pleural Effusion  No evidence of pleural effusion. Aorta \ Miscellaneous  The aorta is within normal limits. Aortic root dimension within normal limits.       PMHx:  -Hx of superior mesenteric artery stenosis s/p PTA/stent in [place]/May 2018  -CMP EF 30-35%  -Hx AICD  -Trivial CAD per [address] [employer] 12/8/17  -CKD  -Renal CA s/p left nephrectomy around 2014  -Dyslipidemia  -Recurrent UTI  -Hx A-fib on [address] [place] [address] Road with Eliquis    PSHx:  -Left nephrectomy for CA    Social Hx:  Nonsmoker  Social alcohol  No drugs    Family Hx:  -Father with \"heart problems\"    Allergies   Allergen Reactions    Morphine      Other reaction(s): Delirium       Current Outpatient Medications   Medication Sig Dispense Refill    midodrine (PROAMATINE) 2.5 MG tablet Take 1 tablet by mouth 3 times daily 90 tablet 3    apixaban (ELIQUIS) 2.5 MG TABS tablet Take 2.5 mg by mouth 2 times daily      tamsulosin (FLOMAX) 0.4 MG capsule Take 0.4 mg by mouth daily      simvastatin (ZOCOR) 20 MG tablet Take 1 tablet by mouth nightly 90 tablet 0    docusate sodium (COLACE) 100 MG capsule Take 100 mg by mouth daily       Multiple Vitamins-Minerals (EYE VITAMINS) CAPS Take 1 capsule by mouth daily      omeprazole (PRILOSEC) 20 MG delayed release capsule Take 1 capsule by mouth daily 30 capsule 0    ondansetron (ZOFRAN) 4 MG tablet TAKE 1 TO 2 TABLETS EVERY 4 TO 6 HOURS AS NEEDED FOR NAUSEA AND VOMITING 30 tablet 1    latanoprost (XALATAN) 0.005 % ophthalmic solution Place 1 drop into both eyes nightly       No current facility-administered medications for this visit.         Past Medical History:   Diagnosis Date    Abnormal finding on EKG 6/13/2016    Atrial fibrillation (HCC) 1/22/2018    BPH (benign prostatic hypertrophy)     Cancer (HCC)     Congestive heart failure ([address] 1/24/2019    GERD (gastroesophageal reflux disease)     Hyperlipidemia     Hypertension     Left bundle branch block 6/13/2016    Osteoarthritis     Sinus bradycardia on ECG 6/13/2016    Type II or unspecified type diabetes mellitus without mention of complication, not stated as uncontrolled        Past Surgical History:   Procedure Laterality Date    ANGIOPLASTY  05/08/2018    percutaneous transluminal angioplasty of the SMA with stent implantation, [facility]ine Workman    COLONOSCOPY  2016    CYSTOSCOPY  01/25/2019    DR SHAE MADRID ESOPHAGOGASTRODUODENOSCOPY TRANSORAL DIAGNOSTIC N/A 6/5/2018    EGD ESOPHAGOGASTRODUODENOSCOPY performed by Jaswant Diggs MD at [facility]  08/12/2016    left kidney, Dr. Raimundo Kendrick  01/2019       Social History     Socioeconomic History    Marital status:      Spouse name: None    Number of children: None    Years of education: None    Highest education level: None   Occupational History    None   Social Needs    Financial resource strain: None    Food insecurity     Worry: None     Inability: None    Transportation needs     Medical: None     Non-medical: None   Tobacco Use    Smoking status: Never Smoker    Smokeless tobacco: Never Used   Substance and Sexual Activity    Alcohol use: Yes     Comment: Rarely beer    Drug use: No    Sexual activity: None   Lifestyle    Physical activity     Days per week: None     Minutes per session: None    Stress: None   Relationships    Social connections     Talks on phone: None     Gets together: None     Attends Yazdanism service: None     Active member of club or organization: None     Attends meetings of clubs or organizations: None     Relationship status: None    Intimate partner violence     Fear of current or ex partner: None     Emotionally abused: None     Physically abused: None     Forced sexual activity: None   Other Topics Concern    None   Social History Narrative    None       Family History   Problem Relation Age of Onset    Heart Attack Father     Heart Attack Brother          Review of Systems:   Review of Systems   Constitutional: Positive for fatigue (with exertion). Negative for activity change, chills, fever and unexpected weight change. HENT: Negative for congestion. Respiratory: Positive for chest tightness (one episode chest pressure last week lasting 30 min and resolving) and shortness of breath (with exertion). Cardiovascular: Positive for chest pain (one episode last week) and palpitations (with exertion). Negative for leg swelling. Gastrointestinal: Negative for blood in stool, nausea and vomiting. Genitourinary: Negative for difficulty urinating, dysuria and hematuria. Musculoskeletal: Negative for arthralgias and myalgias. Cramping in calves when walking   Skin: Negative for color change, pallor and rash. Neurological: Positive for dizziness (upon standing/walking). Negative for syncope and light-headedness. Psychiatric/Behavioral: Negative for agitation and behavioral problems. Physical Examination:    BP (!) 83/57 (Site: Right Upper Arm, Position: Sitting, Cuff Size: Small Adult)   Pulse 83   Resp 18   Ht 5' 9\" (1.753 m)   Wt 157 lb (71.2 kg)   SpO2 99%   BMI 23.18 kg/m²    Physical Exam  Constitutional:       General: He is not in acute distress. Appearance: Normal appearance. HENT:      Head: Normocephalic and atraumatic. Neck:      Musculoskeletal: Normal range of motion and neck supple. Cardiovascular:      Rate and Rhythm: Normal rate and regular rhythm. Pulmonary:      Effort: Pulmonary effort is normal. No respiratory distress. Breath sounds: Normal breath sounds. No wheezing. Comments: Decreased breath sounds at bases  Abdominal:      General: Bowel sounds are normal.      Palpations: Abdomen is soft. Tenderness: There is no abdominal tenderness. Musculoskeletal: Normal range of motion. Right lower leg: No edema. Left lower leg: No edema. Skin:     General: Skin is warm and dry. Coloration: Skin is pale. Neurological:      General: No focal deficit present. Mental Status: He is alert and oriented to person, place, and time. Cranial Nerves: No cranial nerve deficit.    Psychiatric:         Mood and Affect: Mood normal.         Behavior: Behavior normal.         LABS:  CBC:   Lab Results   Component Value Date    WBC 12.5 06/04/2020    RBC 3.12 06/04/2020    RBC 2.89 03/08/2019    HGB 9.7 06/04/2020    HCT 28.6 06/04/2020    MCV 92 06/04/2020    MCH 32.2 03/08/2019    MCHC 33.9 06/04/2020    RDW 13.5 01/24/2019     06/04/2020    MPV 10.6 03/08/2019     Lipids:  Lab Results   Component Value Date    CHOL 164 01/02/2019    CHOL 143 06/29/2018    CHOL 136 04/19/2018     Lab Results   Component Value Date    TRIG 337 (H) 01/02/2019    TRIG 160 06/29/2018    TRIG 326 (H) 04/19/2018     Lab Results   Component Value Date    HDL 34 (L) 01/02/2019    HDL 32 (L) 06/29/2018    HDL 28 (L) 04/19/2018     Lab Results   Component Value Date    LDLCALC 63 01/02/2019    LDLCALC 79 06/29/2018    LDLCALC 43 04/19/2018     Lab Results   Component Value Date    VLDL 16 09/19/2017     Lab Results   Component Value Date    CHOLHDLRATIO 2.9 09/19/2017    CHOLHDLRATIO 4.7 06/21/2011     CMP:    Lab Results   Component Value Date     07/09/2020    K 4.4 07/09/2020    K 4.3 01/25/2018     07/09/2020    CO2 25 07/09/2020    BUN 24 07/09/2020    CREATININE 1.36 07/09/2020    GFRAA >60.0 07/09/2020    AGRATIO 1.3 03/05/2019    LABGLOM 49.7 07/09/2020    GLUCOSE 108 07/09/2020    GLUCOSE 109 02/28/2020    PROT 5.4 03/05/2019    LABALBU 3.1 03/05/2019    CALCIUM 9.3 07/09/2020    BILITOT 0.5 03/05/2019    ALKPHOS 58 03/05/2019    AST 15 03/05/2019    ALT 8 03/05/2019     BMP:    Lab Results   Component Value Date     07/09/2020    K 4.4 07/09/2020    K 4.3 01/25/2018     07/09/2020    CO2 25 07/09/2020    BUN 24 07/09/2020    LABALBU 3.1 03/05/2019    CREATININE 1.36 07/09/2020    CALCIUM 9.3 07/09/2020    GFRAA >60.0 07/09/2020    LABGLOM 49.7 07/09/2020    GLUCOSE 108 07/09/2020    GLUCOSE 109 02/28/2020     Magnesium:    Lab Results   Component Value Date    MG 2.2 03/07/2019     TSH:No results found for: TSHFT4, TSH  .result  No results for input(s): PROBNP in the last 72 hours. No results for input(s): INR in the last 72 hours.             Patient Active Problem List   Diagnosis    Hypertension    Benign prostatic stable  -Check repeat BMP today to monitor renal function and electrolytes as patient noted to be in acute renal failure per recent BMP at [facility] on 7/17/2020 with creatinine elevated at 2.15 compared to previous creatinine of 1.3 on 7/9/2020.  -Cardiac/<2 gram sodium diet recommended  -1500-1800mL fluid restriction--patient advised to be a little bit more liberal with fluid intake over the next week due to concern for mild dehydration given low blood pressure, orthostatic hypotension and worsening renal function  -Instructed patient to weigh self daily every morning upon waking and keep log book of daily weights. Notify office if gaining more than 3 pounds in 48 hours--patient not currently weighing himself daily but states that he will start doing so now  -Advised patient to check blood pressure twice daily in a.m. and p.m. and keep a log of blood pressure trends to bring to next visit. Also advised to record heart rates with each blood pressure reading  -Advised patient to notify office immediately if experiencing any progressive SOB, orthopnea, PND, LE edema or weight gain  -Educated patient on importance of fluid and salt restriction as well as lifestyle modification  -Maintain routine outpatient follow-up with general cardiologist, Dr. Tres Carvalho will be scheduled for follow-up with Dr. Chris Webb next week to reevaluate dizziness complaints and hypotension as I will be out of the office. Also, consider further PAD evaluation in future given possible intermittent lower extremity claudication complaints and history of PAD. Patient was referred to vascular surgery from ER visit at [facility] but has been advised to cancel this appointment and instead continue to follow with Dr. Chris Webb.    -Maintain routine outpatient follow-up with PCP--states he has follow-up appointment in September  Will have office [facility] for help with medication management and education as patient is extremely confused on what medications he is currently taking  -F/U with CHF clinic in 3 weeks or sooner if needed        Counseling: The importance of daily weights, dietary sodium restriction, and contact with cardiology if weight is increased more than 3 lbs in any 48 hour period was stressed. The patient has been advised to contact us if theyexperience progressive SOB, orthopnea, PND or progressive edema.

## 2020-07-21 NOTE — TELEPHONE ENCOUNTER
Patient called to update medication list  Flomax 0.4mg daily  eliquis 2.5mg BID  Furosemide 20mg daily  Omeprazole 20mg daily  Simvastatin 20mg daily  Stool softener daily  Multivitamin daily    Patient stopped coreg    Per SARAH Hamilton PA-C have patient stop the lasix. Patient and caregiver [place] aware.

## 2020-07-23 ENCOUNTER — TELEPHONE (OUTPATIENT)
Dept: CARDIOLOGY CLINIC | Age: 85
End: 2020-07-23

## 2020-07-23 NOTE — TELEPHONE ENCOUNTER
Called to check on patient. He states he is feeling a little better. States his BP is better-today was 127/76 and HR 84. He is walking better as well. He denies dizziness.

## 2020-07-29 ENCOUNTER — OFFICE VISIT (OUTPATIENT)
Dept: CARDIOLOGY CLINIC | Age: 85
End: 2020-07-29
Payer: MEDICARE

## 2020-07-29 VITALS
SYSTOLIC BLOOD PRESSURE: 110 MMHG | DIASTOLIC BLOOD PRESSURE: 60 MMHG | HEART RATE: 108 BPM | BODY MASS INDEX: 23.78 KG/M2 | WEIGHT: 161 LBS | OXYGEN SATURATION: 97 %

## 2020-07-29 PROCEDURE — 99213 OFFICE O/P EST LOW 20 MIN: CPT | Performed by: INTERNAL MEDICINE

## 2020-07-29 RX ORDER — CARVEDILOL 3.12 MG/1
3.12 TABLET ORAL 2 TIMES DAILY
Qty: 60 TABLET | Refills: 3 | Status: SHIPPED | OUTPATIENT
Start: 2020-07-29 | End: 2021-03-02

## 2020-07-29 NOTE — PROGRESS NOTES
[facility] CARDIOLOGY OFFICE FOLLOW-UP      Patient: Filipe Leach  YOB: 1934  MRN: [id number]    Chief Complaint:  Chief Complaint   Patient presents with    Congestive Heart Failure       Subjective/HPI    The patient is followed in the cardiology office chronically for the following problems: CAD, CABG, ICM, mesenteric ischemia    The last encounter focused on the following: followup    Testing/hospitalizations/procedures performed since last encounter: none    Since the last encounter, the patient has not had new symptoms/events. Past Medical History:   Diagnosis Date    Abnormal finding on EKG 6/13/2016    Atrial fibrillation (HCC) 1/22/2018    BPH (benign prostatic hypertrophy)     Cancer (HCC)     Congestive heart failure ([address] 1/24/2019    GERD (gastroesophageal reflux disease)     Hyperlipidemia     Hypertension     Left bundle branch block 6/13/2016    Osteoarthritis     Sinus bradycardia on ECG 6/13/2016    Type II or unspecified type diabetes mellitus without mention of complication, not stated as uncontrolled        Past Surgical History:   Procedure Laterality Date    ANGIOPLASTY  05/08/2018    percutaneous transluminal angioplasty of the SMA with stent implantation, Lani Sarabia    COLONOSCOPY  2016    CYSTOSCOPY  01/25/2019    DR Silver Agudelo    [place] ESOPHAGOGASTRODUODENOSCOPY TRANSORAL DIAGNOSTIC N/A 6/5/2018    EGD ESOPHAGOGASTRODUODENOSCOPY performed by Baltazar Dahl MD at [place] [id number]  08/12/2016    left kidney, Dr. Snow Nazario  01/2019       Family History   Problem Relation Age of Onset    Heart Attack Father     Heart Attack Brother        Social History     Socioeconomic History    Marital status:       Spouse name: Not on file    Number of children: Not on file    Years of education: Not on file    Highest education level: Not on file   Occupational History    Not on file   Social Needs    Financial resource strain: Not on file    Food insecurity     Worry: Not on file     Inability: Not on file    Transportation needs     Medical: Not on file     Non-medical: Not on file   Tobacco Use    Smoking status: Never Smoker    Smokeless tobacco: Never Used   Substance and Sexual Activity    Alcohol use: Yes     Comment: Rarely beer    Drug use: No    Sexual activity: Not on file   Lifestyle    Physical activity     Days per week: Not on file     Minutes per session: Not on file    Stress: Not on file   Relationships    Social connections     Talks on phone: Not on file     Gets together: Not on file     Attends [employer] service: Not on file     Active member of club or organization: Not on file     Attends meetings of clubs or organizations: Not on file     Relationship status: Not on file    Intimate partner violence     Fear of current or ex partner: Not on file     Emotionally abused: Not on file     Physically abused: Not on file     Forced sexual activity: Not on file   Other Topics Concern    Not on file   Social History Narrative    Not on file       Allergies   Allergen Reactions    Morphine      Other reaction(s): Delirium       Current Outpatient Medications   Medication Sig Dispense Refill    carvedilol (COREG) 3.125 MG tablet Take 1 tablet by mouth 2 times daily 60 tablet 3    midodrine (PROAMATINE) 2.5 MG tablet Take 1 tablet by mouth 3 times daily 90 tablet 3    apixaban (ELIQUIS) 2.5 MG TABS tablet Take 2.5 mg by mouth 2 times daily      tamsulosin (FLOMAX) 0.4 MG capsule Take 0.4 mg by mouth daily      simvastatin (ZOCOR) 20 MG tablet Take 1 tablet by mouth nightly 90 tablet 0    docusate sodium (COLACE) 100 MG capsule Take 100 mg by mouth daily       Multiple Vitamins-Minerals (EYE VITAMINS) CAPS Take 1 capsule by mouth daily      omeprazole (PRILOSEC) 20 MG delayed release capsule Take 1 capsule by mouth daily 30 capsule 0    ondansetron (ZOFRAN) 4 MG tablet TAKE 1 TO 2 TABLETS EVERY 4 TO 6 HOURS AS NEEDED FOR NAUSEA AND VOMITING 30 tablet 1    latanoprost (XALATAN) 0.005 % ophthalmic solution Place 1 drop into both eyes nightly       No current facility-administered medications for this visit. Review of Systems:   Review of Systems   Constitutional: Negative for activity change and appetite change. HENT: Negative for congestion. Respiratory: Negative for apnea, choking and chest tightness. Cardiovascular: Negative for chest pain. Gastrointestinal: Negative for abdominal distention and abdominal pain. Endocrine: Negative for cold intolerance and heat intolerance. Genitourinary: Negative for dysuria and enuresis. Musculoskeletal: Negative for arthralgias and back pain. Skin: Negative for color change. Allergic/Immunologic: Negative. Neurological: Negative for dizziness, seizures, syncope and light-headedness. Psychiatric/Behavioral: Negative for agitation, behavioral problems and confusion. Physical Examination:    /60 (Site: Right Upper Arm, Position: Sitting, Cuff Size: Large Adult)   Pulse 108   Wt 161 lb (73 kg)   SpO2 97%   BMI 23.78 kg/m²    Physical Exam   Constitutional: The patient appears healthy. No distress. HENT: Mouth/Throat: Oropharynx is clear. Eyes: Pupils are equal, round, and reactive to light. Neck: Normal range of motion. No JVD present. Cardiovascular: Regular rhythm, S1 normal, S2 normal, normal heart sounds and intact distal pulses. Exam reveals no gallop. No murmur heard. Pulses:       Radial pulses are 2+ on the right side. Dorsalis pedis pulses are 2+ on the right side. Pulmonary/Chest: Effort normal and breath sounds normal. No wheezes. No rales. No tenderness. Abdominal: Soft. Bowel sounds are normal.   Musculoskeletal: Normal range of motion. No edema. Neurological: The patient is alert and oriented to person, place, and time. Intact cranial nerves.    Skin: Skin is warm and dry. No rash noted.        LABS:  CBC:   Lab Results   Component Value Date    WBC 12.5 06/04/2020    RBC 3.12 06/04/2020    RBC 2.89 03/08/2019    HGB 9.7 06/04/2020    HCT 28.6 06/04/2020    MCV 92 06/04/2020    MCH 32.2 03/08/2019    MCHC 33.9 06/04/2020    RDW 13.5 01/24/2019     06/04/2020    MPV 10.6 03/08/2019     Lipids:  Lab Results   Component Value Date    CHOL 164 01/02/2019    CHOL 143 06/29/2018    CHOL 136 04/19/2018     Lab Results   Component Value Date    TRIG 337 (H) 01/02/2019    TRIG 160 06/29/2018    TRIG 326 (H) 04/19/2018     Lab Results   Component Value Date    HDL 34 (L) 01/02/2019    HDL 32 (L) 06/29/2018    HDL 28 (L) 04/19/2018     Lab Results   Component Value Date    LDLCALC 63 01/02/2019    LDLCALC 79 06/29/2018    LDLCALC 43 04/19/2018     Lab Results   Component Value Date    VLDL 16 09/19/2017     Lab Results   Component Value Date    CHOLHDLRATIO 2.9 09/19/2017    CHOLHDLRATIO 4.7 06/21/2011     CMP:    Lab Results   Component Value Date     07/21/2020    K 3.9 07/21/2020    K 4.3 01/25/2018    CL 98 07/21/2020    CO2 25 07/21/2020    BUN 25 07/21/2020    CREATININE 1.89 07/21/2020    GFRAA 41.1 07/21/2020    AGRATIO 1.3 03/05/2019    LABGLOM 34.0 07/21/2020    GLUCOSE 111 07/21/2020    GLUCOSE 109 02/28/2020    PROT 5.4 03/05/2019    LABALBU 3.1 03/05/2019    CALCIUM 9.0 07/21/2020    BILITOT 0.5 03/05/2019    ALKPHOS 58 03/05/2019    AST 15 03/05/2019    ALT 8 03/05/2019     BMP:    Lab Results   Component Value Date     07/21/2020    K 3.9 07/21/2020    K 4.3 01/25/2018    CL 98 07/21/2020    CO2 25 07/21/2020    BUN 25 07/21/2020    LABALBU 3.1 03/05/2019    CREATININE 1.89 07/21/2020    CALCIUM 9.0 07/21/2020    GFRAA 41.1 07/21/2020    LABGLOM 34.0 07/21/2020    GLUCOSE 111 07/21/2020    GLUCOSE 109 02/28/2020     Magnesium:    Lab Results   Component Value Date    MG 2.2 03/07/2019     TSH:No results found for: TSHFT4, TSH    Patient Active Problem List   Diagnosis    Hypertension    Benign prostatic hyperplasia    GERD (gastroesophageal reflux disease)    Osteoarthritis    Type 2 diabetes mellitus without complication (HCC)    Hyperlipidemia    Bladder cancer (HCC)    Sinus bradycardia on ECG    Left bundle branch block    Abnormal finding on EKG    Primary bladder malignant neoplasm (HCC)    Hypogonadism in male    Atrial fibrillation (HCC)    Abdominal pain    Mesenteric ischemia (HCC)    Epigastric abdominal pain    Nausea    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    Urothelial carcinoma (HCC)    Chronic kidney disease, stage III (moderate) (HCC)    Congestive heart failure (HCC)       Medications:  Current Outpatient Medications   Medication Sig Dispense Refill    carvedilol (COREG) 3.125 MG tablet Take 1 tablet by mouth 2 times daily 60 tablet 3    midodrine (PROAMATINE) 2.5 MG tablet Take 1 tablet by mouth 3 times daily 90 tablet 3    apixaban (ELIQUIS) 2.5 MG TABS tablet Take 2.5 mg by mouth 2 times daily      tamsulosin (FLOMAX) 0.4 MG capsule Take 0.4 mg by mouth daily      simvastatin (ZOCOR) 20 MG tablet Take 1 tablet by mouth nightly 90 tablet 0    docusate sodium (COLACE) 100 MG capsule Take 100 mg by mouth daily       Multiple Vitamins-Minerals (EYE VITAMINS) CAPS Take 1 capsule by mouth daily      omeprazole (PRILOSEC) 20 MG delayed release capsule Take 1 capsule by mouth daily 30 capsule 0    ondansetron (ZOFRAN) 4 MG tablet TAKE 1 TO 2 TABLETS EVERY 4 TO 6 HOURS AS NEEDED FOR NAUSEA AND VOMITING 30 tablet 1    latanoprost (XALATAN) 0.005 % ophthalmic solution Place 1 drop into both eyes nightly       No current facility-administered medications for this visit. Assessment/Plan:    1. Chronic systolic CHF (congestive heart failure), NYHA class 3 ([facility] [address]  Resuming HF meds as BP has improved  - carvedilol (COREG) 3.125 MG tablet; Take 1 tablet by mouth 2 times daily  Dispense: 60 tablet;  Refill: 3       Counseling: the patient was counseled regarding diet, exercise, weight control and heart healthy lifestyle. Return in about 4 weeks (around 8/26/2020) for followup cv disease. Electronically signed by   Suellen Kamara.  Gm Kelly MD [facility] Director of Cardiology Services and Cardiac Catheterization Laboratory  [facility], [place]    on 7/29/2020 at 3:36 PM

## 2020-08-26 ENCOUNTER — OFFICE VISIT (OUTPATIENT)
Dept: CARDIOLOGY CLINIC | Age: 85
End: 2020-08-26
Payer: MEDICARE

## 2020-08-26 VITALS
WEIGHT: 161 LBS | OXYGEN SATURATION: 100 % | RESPIRATION RATE: 14 BRPM | SYSTOLIC BLOOD PRESSURE: 118 MMHG | BODY MASS INDEX: 23.78 KG/M2 | DIASTOLIC BLOOD PRESSURE: 72 MMHG | HEART RATE: 72 BPM

## 2020-08-26 PROCEDURE — 99213 OFFICE O/P EST LOW 20 MIN: CPT | Performed by: INTERNAL MEDICINE

## 2020-08-26 NOTE — PROGRESS NOTES
[facility] CARDIOLOGY OFFICE FOLLOW-UP      Patient: Cherrie Peabody  YOB: 1934  MRN: [id number]    Chief Complaint:  Chief Complaint   Patient presents with    Hypertension    Bradycardia    Congestive Heart Failure       Subjective/HPI    The patient is followed in the cardiology office chronically for the following problems: CAD, ICM, chronic systolic CHF, HTN, HPL     The last encounter focused on the following: followup    Testing/hospitalizations/procedures performed since last encounter: none    Since the last encounter, the patient has not had new symptoms/events. Past Medical History:   Diagnosis Date    Abnormal finding on EKG 6/13/2016    Atrial fibrillation (HCC) 1/22/2018    BPH (benign prostatic hypertrophy)     Cancer (HCC)     Congestive heart failure ([address] 1/24/2019    GERD (gastroesophageal reflux disease)     Hyperlipidemia     Hypertension     Left bundle branch block 6/13/2016    Osteoarthritis     Sinus bradycardia on ECG 6/13/2016    Type II or unspecified type diabetes mellitus without mention of complication, not stated as uncontrolled        Past Surgical History:   Procedure Laterality Date    ANGIOPLASTY  05/08/2018    percutaneous transluminal angioplasty of the SMA with stent implantation, Branden Gaines    COLONOSCOPY  2016    CYSTOSCOPY  01/25/2019    DR Murillo Trinid[place]    [place] ESOPHAGOGASTRODUODENOSCOPY TRANSORAL DIAGNOSTIC N/A 6/5/2018    EGD ESOPHAGOGASTRODUODENOSCOPY performed by Stephany Kidd MD at [place] [address]  08/12/2016    left kidney, Dr. Junior Garcia  01/2019       Family History   Problem Relation Age of Onset    Heart Attack Father     Heart Attack Brother        Social History     Socioeconomic History    Marital status:       Spouse name: None    Number of children: None    Years of education: None    Highest education level: None   Occupational History    None Social Needs    Financial resource strain: None    Food insecurity     Worry: None     Inability: None    Transportation needs     Medical: None     Non-medical: None   Tobacco Use    Smoking status: Never Smoker    Smokeless tobacco: Never Used   Substance and Sexual Activity    Alcohol use: Yes     Comment: Rarely beer    Drug use: No    Sexual activity: None   Lifestyle    Physical activity     Days per week: None     Minutes per session: None    Stress: None   Relationships    Social connections     Talks on phone: None     Gets together: None     Attends Nond[employer] service: None     Active member of club or organization: None     Attends meetings of clubs or organizations: None     Relationship status: None    Intimate partner violence     Fear of current or ex partner: None     Emotionally abused: None     Physically abused: None     Forced sexual activity: None   Other Topics Concern    None   Social History Narrative    None       Allergies   Allergen Reactions    Morphine      Other reaction(s): Delirium       Current Outpatient Medications   Medication Sig Dispense Refill    carvedilol (COREG) 3.125 MG tablet Take 1 tablet by mouth 2 times daily 60 tablet 3    midodrine (PROAMATINE) 2.5 MG tablet Take 1 tablet by mouth 3 times daily 90 tablet 3    apixaban (ELIQUIS) 2.5 MG TABS tablet Take 2.5 mg by mouth 2 times daily      tamsulosin (FLOMAX) 0.4 MG capsule Take 0.4 mg by mouth daily      simvastatin (ZOCOR) 20 MG tablet Take 1 tablet by mouth nightly 90 tablet 0    Multiple Vitamins-Minerals (EYE VITAMINS) CAPS Take 1 capsule by mouth daily      ondansetron (ZOFRAN) 4 MG tablet TAKE 1 TO 2 TABLETS EVERY 4 TO 6 HOURS AS NEEDED FOR NAUSEA AND VOMITING 30 tablet 1    latanoprost (XALATAN) 0.005 % ophthalmic solution Place 1 drop into both eyes nightly      omeprazole (PRILOSEC) 40 MG delayed release capsule Take 1 capsule by mouth every morning (before breakfast) 90 capsule 1     No current facility-administered medications for this visit. Review of Systems:   Review of Systems   Constitutional: Negative for activity change and appetite change. HENT: Negative for congestion. Respiratory: Negative for apnea, choking and chest tightness. Cardiovascular: Negative for chest pain. Gastrointestinal: Negative for abdominal distention and abdominal pain. Endocrine: Negative for cold intolerance and heat intolerance. Genitourinary: Negative for dysuria and enuresis. Musculoskeletal: Negative for arthralgias and back pain. Skin: Negative for color change. Allergic/Immunologic: Negative. Neurological: Negative for dizziness, seizures, syncope and light-headedness. Psychiatric/Behavioral: Negative for agitation, behavioral problems and confusion. Physical Examination:    /72 (Site: Right Upper Arm, Position: Sitting, Cuff Size: Medium Adult)   Pulse 72   Resp 14   Wt 161 lb (73 kg)   SpO2 100%   BMI 23.78 kg/m²    Physical Exam   Constitutional: The patient appears healthy. No distress. HENT: Mouth/Throat: Oropharynx is clear. Eyes: Pupils are equal, round, and reactive to light. Neck: Normal range of motion. No JVD present. Cardiovascular: Regular rhythm, S1 normal, S2 normal, normal heart sounds and intact distal pulses. Exam reveals no gallop. No murmur heard. Pulses:       Radial pulses are 2+ on the right side. Dorsalis pedis pulses are 2+ on the right side. Pulmonary/Chest: Effort normal and breath sounds normal. No wheezes. No rales. No tenderness. Abdominal: Soft. Bowel sounds are normal.   Musculoskeletal: Normal range of motion. No edema. Neurological: The patient is alert and oriented to person, place, and time. Intact cranial nerves. Skin: Skin is warm and dry. No rash noted.        LABS:  CBC:   Lab Results   Component Value Date    WBC 12.5 06/04/2020    RBC 3.12 06/04/2020    RBC 2.89 03/08/2019    HGB 9.7 06/04/2020 HCT 28.6 06/04/2020    MCV 92 06/04/2020    MCH 32.2 03/08/2019    MCHC 33.9 06/04/2020    RDW 13.5 01/24/2019     06/04/2020    MPV 10.6 03/08/2019     Lipids:  Lab Results   Component Value Date    CHOL 164 01/02/2019    CHOL 143 06/29/2018    CHOL 136 04/19/2018     Lab Results   Component Value Date    TRIG 337 (H) 01/02/2019    TRIG 160 06/29/2018    TRIG 326 (H) 04/19/2018     Lab Results   Component Value Date    HDL 34 (L) 01/02/2019    HDL 32 (L) 06/29/2018    HDL 28 (L) 04/19/2018     Lab Results   Component Value Date    LDLCALC 63 01/02/2019    LDLCALC 79 06/29/2018    LDLCALC 43 04/19/2018     Lab Results   Component Value Date    VLDL 16 09/19/2017     Lab Results   Component Value Date    CHOLHDLRATIO 2.9 09/19/2017    CHOLHDLRATIO 4.7 06/21/2011     CMP:    Lab Results   Component Value Date     07/21/2020    K 3.9 07/21/2020    K 4.3 01/25/2018    CL 98 07/21/2020    CO2 25 07/21/2020    BUN 25 07/21/2020    CREATININE 1.89 07/21/2020    GFRAA 41.1 07/21/2020    AGRATIO 1.3 03/05/2019    LABGLOM 34.0 07/21/2020    GLUCOSE 111 07/21/2020    GLUCOSE 109 02/28/2020    PROT 5.4 03/05/2019    LABALBU 3.1 03/05/2019    CALCIUM 9.0 07/21/2020    BILITOT 0.5 03/05/2019    ALKPHOS 58 03/05/2019    AST 15 03/05/2019    ALT 8 03/05/2019     BMP:    Lab Results   Component Value Date     07/21/2020    K 3.9 07/21/2020    K 4.3 01/25/2018    CL 98 07/21/2020    CO2 25 07/21/2020    BUN 25 07/21/2020    LABALBU 3.1 03/05/2019    CREATININE 1.89 07/21/2020    CALCIUM 9.0 07/21/2020    GFRAA 41.1 07/21/2020    LABGLOM 34.0 07/21/2020    GLUCOSE 111 07/21/2020    GLUCOSE 109 02/28/2020     Magnesium:    Lab Results   Component Value Date    MG 2.2 03/07/2019     TSH:No results found for: TSHFT4, TSH    Patient Active Problem List   Diagnosis    Hypertension    Benign prostatic hyperplasia    GERD (gastroesophageal reflux disease)    Osteoarthritis    Type 2 diabetes mellitus without complication ([facility] [address]    Hyperlipidemia    Bladder cancer (HCC)    Sinus bradycardia on ECG    Left bundle branch block    Abnormal finding on EKG    Primary bladder malignant neoplasm (HCC)    Hypogonadism in male    Atrial fibrillation (HCC)    Abdominal pain    Mesenteric ischemia (HCC)    Epigastric abdominal pain    Nausea    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    Urothelial carcinoma (HCC)    Chronic kidney disease, stage III (moderate) (HCC)    Congestive heart failure (HCC)       Medications:  Current Outpatient Medications   Medication Sig Dispense Refill    carvedilol (COREG) 3.125 MG tablet Take 1 tablet by mouth 2 times daily 60 tablet 3    midodrine (PROAMATINE) 2.5 MG tablet Take 1 tablet by mouth 3 times daily 90 tablet 3    apixaban (ELIQUIS) 2.5 MG TABS tablet Take 2.5 mg by mouth 2 times daily      tamsulosin (FLOMAX) 0.4 MG capsule Take 0.4 mg by mouth daily      simvastatin (ZOCOR) 20 MG tablet Take 1 tablet by mouth nightly 90 tablet 0    Multiple Vitamins-Minerals (EYE VITAMINS) CAPS Take 1 capsule by mouth daily      ondansetron (ZOFRAN) 4 MG tablet TAKE 1 TO 2 TABLETS EVERY 4 TO 6 HOURS AS NEEDED FOR NAUSEA AND VOMITING 30 tablet 1    latanoprost (XALATAN) 0.005 % ophthalmic solution Place 1 drop into both eyes nightly      omeprazole (PRILOSEC) 40 MG delayed release capsule Take 1 capsule by mouth every morning (before breakfast) 90 capsule 1     No current facility-administered medications for this visit. Assessment/Plan:    1. Essential hypertension      2. Sinus bradycardia on ECG    Continue current cardiac care         Counseling: the patient was counseled regarding diet, exercise, weight control and heart healthy lifestyle. Return in about 3 months (around 11/26/2020). Electronically signed by   Lindsey Herrera.  June Myers MD [facility] Director of Cardiology Services and Cardiac Catheterization Laboratory  [facility], [place]    on 9/27/2020 at 8:56 PM

## 2020-09-09 ENCOUNTER — VIRTUAL VISIT (OUTPATIENT)
Dept: GASTROENTEROLOGY | Age: 85
End: 2020-09-09
Payer: MEDICARE

## 2020-09-09 PROCEDURE — 99214 OFFICE O/P EST MOD 30 MIN: CPT | Performed by: NURSE PRACTITIONER

## 2020-09-09 RX ORDER — OMEPRAZOLE 40 MG/1
40 CAPSULE, DELAYED RELEASE ORAL
Qty: 90 CAPSULE | Refills: 1 | Status: SHIPPED | OUTPATIENT
Start: 2020-09-09 | End: 2021-03-23 | Stop reason: SDUPTHER

## 2020-09-09 NOTE — PROGRESS NOTES
2020    TELEHEALTH EVALUATION -- Audio/Visual (During EUDHM-58 public health emergency)    Due to COVID 19 outbreak, patient's office visit was converted to a virtual visit. Patient was contacted and agreed to proceed with a virtual visit via Telephone Visit 20 minutes   The risks and benefits of converting to a virtual visit were discussed in light of the current infectious disease epidemic. Patient also understood that insurance coverage and co-pays are up to their individual insurance plans. HPI:  Windy Washington (:  1934) has requested an audio/video evaluation for the following concern(s):  Patient reports excessive bloating and flatus. Has a daily formed BM, but stool is like round balls. Never feels fully evacuated. Taking Colace daily and MiraLAX as needed. Denies melena or hematochezia. No weight loss or loss of appetite. Patient with lower abdominal pain, activity makes this worse and rest helps. The pain is intermittent and has been waxing and waning for the past 2 to 3 weeks. Patient with GERD and excessive belching. Taking Tums with suboptimal response. No dysphagia. No nausea or vomiting. No fever or chills. Patient denies chest pain, shortness of breath, or palpitations. Endoscopic investigations:   EGD 2018-Irregular Z line. Diffuse atrophic mucosa found in the gastric body and antrum. Normal examined duodenum  Colonoscopy 2016: Report not available for us to review in epic       Review of Systems   All other systems reviewed and are negative. Prior to Visit Medications    Medication Sig Taking?  Authorizing Provider   omeprazole (PRILOSEC) 40 MG delayed release capsule Take 1 capsule by mouth every morning (before breakfast) Yes Dyana Guevara APRN - CNP   carvedilol (COREG) 3.125 MG tablet Take 1 tablet by mouth 2 times daily Yes Malena Hoyt MD   midodrine (PROAMATINE) 2.5 MG tablet Take 1 tablet by mouth 3 times daily Yes Delfina Soto FREDY Carter   apixaban (ELIQUIS) 2.5 MG TABS tablet Take 2.5 mg by mouth 2 times daily Yes Historical Provider, MD   tamsulosin (FLOMAX) 0.4 MG capsule Take 0.4 mg by mouth daily Yes Historical Provider, MD   simvastatin (ZOCOR) 20 MG tablet Take 1 tablet by mouth nightly Yes Tigre Sims, APRN - CNP   Multiple Vitamins-Minerals (EYE VITAMINS) CAPS Take 1 capsule by mouth daily Yes Historical Provider, MD   ondansetron (ZOFRAN) 4 MG tablet TAKE 1 TO 2 TABLETS EVERY 4 TO 6 HOURS AS NEEDED FOR NAUSEA AND VOMITING Yes Octavio Allen MD   latanoprost (XALATAN) 0.005 % ophthalmic solution Place 1 drop into both eyes nightly Yes Historical Provider, MD       Social History     Tobacco Use    Smoking status: Never Smoker    Smokeless tobacco: Never Used   Substance Use Topics    Alcohol use: Yes     Comment: Rarely beer    Drug use: No      Allergies   Allergen Reactions    Morphine      Other reaction(s): Delirium   ,   Past Medical History:   Diagnosis Date    Abnormal finding on EKG 6/13/2016    Atrial fibrillation ([facility] [address] 1/22/2018    BPH (benign prostatic hypertrophy)     Cancer (HCC)     Congestive heart failure ([facility] [address] 1/24/2019    GERD (gastroesophageal reflux disease)     Hyperlipidemia     Hypertension     Left bundle branch block 6/13/2016    Osteoarthritis     Sinus bradycardia on ECG 6/13/2016    Type II or unspecified type diabetes mellitus without mention of complication, not stated as uncontrolled    ,   Past Surgical History:   Procedure Laterality Date    ANGIOPLASTY  05/08/2018    percutaneous transluminal angioplasty of the SMA with stent implantation, Starlin Klinefelter    COLONOSCOPY  2016    CYSTOSCOPY  01/25/2019    DR King Ball    [place] ESOPHAGOGASTRODUODENOSCOPY TRANSORAL DIAGNOSTIC N/A 6/5/2018    EGD ESOPHAGOGASTRODUODENOSCOPY performed by Alison Velázquez MD at [place] [id number]  08/12/2016    left kidney, Dr. Val Carbone PLACEMENT  01/2019   ,   Social History     Tobacco Use    Smoking status: Never Smoker    Smokeless tobacco: Never Used   Substance Use Topics    Alcohol use: Yes     Comment: Rarely beer    Drug use: No       Due to this being a TeleHealth encounter, evaluation of the following organ systems is limited: Vitals/Constitutional/EENT/Resp/CV/GI//MS/Neuro/Skin/Heme-Lymph-Imm. ASSESSMENT/PLAN:  51-year-old male patient with clinical history suggestive of CIC. Recommend discontinuing Colace. Will add MiraLAX 17 g daily, adequate fluid intake and dietary fiber. No alarming symptoms such as melena, hematochezia, weight loss or loss of appetite. GERD with excessive belching. Suboptimal response to H2 blocker. Will add omeprazole 40 mg daily 15 to 30 minutes before breakfast.  Antireflux lifestyle recommended, examples provided. 1. Gastroesophageal reflux disease without esophagitis  2. Constipation, unspecified constipation type  3. Bloating  4. Excessive flatus  5. Belching  6. Lower abdominal pain    Return in about 4 weeks (around 10/7/2020). An  electronic signature was used to authenticate this note. --MOSHE Baze - CNP on 9/9/2020 at 3:15 PM      Pursuant to the emergency declaration under the [facility] waiver authority and the [employer] [employer] and [employer] Act, this Virtual  Visit was conducted, with patient's consent, to reduce the patient's risk of exposure to COVID-19 and provide continuity of care for an established patient. Services were provided through a video synchronous discussion virtually to substitute for in-person clinic visit.

## 2020-09-09 NOTE — PATIENT INSTRUCTIONS
Discontinue Colace 100 mg daily  Take MiraLAX 17 grams daily- this is to help move your bowels. Omeprazole 40 mg daily 15 minutes before breakfast. This is for your heart burn.

## 2020-10-07 ENCOUNTER — TELEPHONE (OUTPATIENT)
Dept: GASTROENTEROLOGY | Age: 85
End: 2020-10-07

## 2020-10-07 ENCOUNTER — OFFICE VISIT (OUTPATIENT)
Dept: GASTROENTEROLOGY | Age: 85
End: 2020-10-07
Payer: MEDICARE

## 2020-10-07 VITALS
BODY MASS INDEX: 23.85 KG/M2 | DIASTOLIC BLOOD PRESSURE: 58 MMHG | OXYGEN SATURATION: 95 % | HEIGHT: 69 IN | HEART RATE: 79 BPM | WEIGHT: 161 LBS | SYSTOLIC BLOOD PRESSURE: 108 MMHG

## 2020-10-07 PROCEDURE — 99214 OFFICE O/P EST MOD 30 MIN: CPT | Performed by: NURSE PRACTITIONER

## 2020-10-07 RX ORDER — FAMOTIDINE 20 MG/1
20 TABLET, FILM COATED ORAL 2 TIMES DAILY
Qty: 60 TABLET | Refills: 3 | Status: SHIPPED | OUTPATIENT
Start: 2020-10-07 | End: 2020-11-02

## 2020-10-07 RX ORDER — POLYETHYLENE GLYCOL 3350, SODIUM CHLORIDE, SODIUM BICARBONATE, POTASSIUM CHLORIDE 420; 11.2; 5.72; 1.48 G/4L; G/4L; G/4L; G/4L
4000 POWDER, FOR SOLUTION ORAL ONCE
Qty: 1 BOTTLE | Refills: 0 | Status: SHIPPED | OUTPATIENT
Start: 2020-10-07 | End: 2020-10-07

## 2020-10-07 RX ORDER — POLYETHYLENE GLYCOL 3350, SODIUM CHLORIDE, SODIUM BICARBONATE, POTASSIUM CHLORIDE 420; 11.2; 5.72; 1.48 G/4L; G/4L; G/4L; G/4L
4000 POWDER, FOR SOLUTION ORAL ONCE
Qty: 1 BOTTLE | Refills: 0 | Status: CANCELLED | OUTPATIENT
Start: 2020-10-07 | End: 2020-10-07

## 2020-10-07 NOTE — PROGRESS NOTES
Gastroenterology Clinic Follow up Visit    Saturnino Nam  [id number]  Chief Complaint   Patient presents with    Follow-up     Patient f/u. Sx have not improved. Maintaining miralax regimen, BM every day. Abdominal pain. Excess gas     Gastroesophageal Reflux     HPI: Last seen in GI clinic on 9/9/20  With Constipation, GERD, Belching , lower abd pain . Interval change:   Lower abdominal pain that radiates to his back. Pain is exacerbated with movement. Rest helps the pain. BM: Daily BM-taking MiraLAX every other day. Small amounts of stool, has to strain. Does not feel fully evacuated. Feels like there is a \"blockage\". No melena  or hematochezia. Added prunes with suboptimal response. Decreased appetite, recently. Feels bloated and gassy. No weight loss. +GERD, suboptimal response to PPI. +dysphagia to both liquids and solids. Non-smoker, no alcohol use. No excessive caffeine intake. He denies chest pain, shortness of breath, or palpitations. He denies any syncopal episodes. 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 AICD. CAD, left heart cath 12/8/2017. Dyslipidemia and history of A. fib on Eliquis. Patient also has history of renal CA status post left nephrectomy-2014. Previous GI work up/Endoscopic investigations:   Endoscopic investigations:   EGD 6/5/2018-Irregular Z line. Diffuse atrophic mucosa found in the gastric body and antrum. Normal examined duodenum  Colonoscopy 2016: Report not available for us to review in epic     Review of Systems   All other systems reviewed and are negative. Past medical history, past surgical history, medication list, social and familyhistory reviewed    Blood pressure (!) 108/58, pulse 79, height 5' 9\" (1.753 m), weight 161 lb (73 kg), SpO2 95 %. Physical Exam  Vitals signs reviewed. Constitutional:       General: He is not in acute distress. Appearance: He is well-developed. He is not diaphoretic. HENT:      Head: Normocephalic and atraumatic. Eyes:      General: No scleral icterus. Conjunctiva/sclera: Conjunctivae normal.      Pupils: Pupils are equal, round, and reactive to light. Neck:      Musculoskeletal: Normal range of motion and neck supple. Cardiovascular:      Rate and Rhythm: Normal rate and regular rhythm. Pulmonary:      Effort: Pulmonary effort is normal. No respiratory distress. Breath sounds: Normal breath sounds. No wheezing or rales. Chest:      Chest wall: No tenderness. Abdominal:      General: Bowel sounds are normal. There is no distension. Palpations: Abdomen is soft. There is no mass. Tenderness: There is no abdominal tenderness. There is no guarding or rebound. Genitourinary:     Comments: deferred  Musculoskeletal: Normal range of motion. Right lower leg: No edema. Left lower leg: No edema. Lymphadenopathy:      Cervical: No cervical adenopathy. Skin:     Coloration: Skin is not jaundiced or pale. Findings: No bruising, erythema or rash. Neurological:      Mental Status: He is alert and oriented to person, place, and time. Psychiatric:         Behavior: Behavior normal.         Thought Content: Thought content normal.         Judgment: Judgment normal.         Laboratory, Pathology, Radiology reviewed in detail with relevantimportant investigations summarized below:    No results for input(s): WBC, HGB, HCT, MCV, PLT in the last 720 hours. Lab Results   Component Value Date    ALT 8 (L) 03/05/2019    AST 15 03/05/2019    ALKPHOS 58 03/05/2019    BILITOT 0.5 03/05/2019     Assessment and Plan:  Yunier Medina 80 y.o. male with ongoing complaints of lower abdominal pain radiating to his back. Symptoms waxing and waning. Ongoing constipation despite bowel regimen. Significant cardiovascular history. Wide differential to patient's complaints.   Recommend he continue MiraLAX 17 g daily, titrate to have 1-2 soft formed stools per day. It was recommended he take MiraLAX 2-3 times per day and decrease the dose if needed. Dietary fiber and fluids recommended. Patient is on Eliquis for history of AF. Will contact Dr. Sonia Coleman for clearance to hold Eliquis for endoscopic investigations. The colonoscopy was discussed at length, including the risks and benefits. Split prep prescribed and discussed. Importance of the prep to ensure a good exam was emphasized. -Will  proceed with CT scan of the abdomen and pelvis without contrast due to CKD. Patient with ongoing GERD despite PPI. No significant risk factors for GERD symptoms such as, excessive caffeine, smoking, fatty/greasy food.  -Antireflux lifestyle reiterated, examples provided. Continue daily PPI 15 to 30 minutes before breakfast.  Add H2 blocker twice daily as needed.  -Patient with new complaint of decreased appetite and dysphagia to both liquids and solids. Patient likely experiencing dysphagia due to esophageal spasm secondary to acid reflux. Will proceed with EGD to rule out hiatal hernia versus lax LES. 1. Lower abdominal pain  - CT ABDOMEN PELVIS WO CONTRAST Additional Contrast? Radiologist Recommendation; Future  - Colonoscopy; Future  2. Constipation, unspecified constipation type  - Colonoscopy; Future  3. Dysphagia, unspecified type  - EGD; Future  4. Gastroesophageal reflux disease, unspecified whether esophagitis present  - EGD; Future    Follow-up after endoscopic investigations    Leon Prince, [address]    Please note this report has been partially produced using speech recognitionsoftware  and may cause contain errors related to that system including grammar, punctuation and spelling as well as words andphrases that may seem inappropriate. If there are questions or concerns please feel free to contact me to clarify.

## 2020-10-13 ENCOUNTER — NURSE ONLY (OUTPATIENT)
Dept: PRIMARY CARE CLINIC | Age: 85
End: 2020-10-13

## 2020-10-15 LAB
SARS-COV-2: NOT DETECTED
SOURCE: NORMAL

## 2020-10-19 ENCOUNTER — HOSPITAL ENCOUNTER (OUTPATIENT)
Dept: CT IMAGING | Age: 85
Discharge: HOME | End: 2020-10-21
Payer: MEDICARE

## 2020-10-19 PROCEDURE — 74176 CT ABD & PELVIS W/O CONTRAST: CPT

## 2020-10-20 ENCOUNTER — ANCILLARY PROCEDURE (OUTPATIENT)
Dept: ENDOSCOPY | Age: 85
End: 2020-10-20
Attending: INTERNAL MEDICINE
Payer: MEDICARE

## 2020-10-20 ENCOUNTER — ANESTHESIA EVENT (OUTPATIENT)
Dept: ENDOSCOPY | Age: 85
End: 2020-10-20
Payer: MEDICARE

## 2020-10-20 ENCOUNTER — HOSPITAL ENCOUNTER (OUTPATIENT)
Age: 85
Setting detail: OUTPATIENT SURGERY
Discharge: HOME | End: 2020-10-20
Attending: INTERNAL MEDICINE | Admitting: INTERNAL MEDICINE
Payer: MEDICARE

## 2020-10-20 ENCOUNTER — ANESTHESIA (OUTPATIENT)
Dept: ENDOSCOPY | Age: 85
End: 2020-10-20
Payer: MEDICARE

## 2020-10-20 VITALS
BODY MASS INDEX: 23.7 KG/M2 | RESPIRATION RATE: 16 BRPM | HEART RATE: 60 BPM | OXYGEN SATURATION: 99 % | SYSTOLIC BLOOD PRESSURE: 132 MMHG | DIASTOLIC BLOOD PRESSURE: 66 MMHG | HEIGHT: 69 IN | WEIGHT: 160 LBS | TEMPERATURE: 97.6 F

## 2020-10-20 VITALS
RESPIRATION RATE: 10 BRPM | OXYGEN SATURATION: 99 % | SYSTOLIC BLOOD PRESSURE: 100 MMHG | DIASTOLIC BLOOD PRESSURE: 58 MMHG

## 2020-10-20 DIAGNOSIS — K63.89 CECUM MASS: ICD-10-CM

## 2020-10-20 LAB
CEA: 1.7 NG/ML (ref 0–5.5)
HCT VFR BLD CALC: 33 % (ref 42–52)
HEMOGLOBIN: 11.2 G/DL (ref 14–18)
MCH RBC QN AUTO: 31.3 PG (ref 27–31.3)
MCHC RBC AUTO-ENTMCNC: 33.8 % (ref 33–37)
MCV RBC AUTO: 92.5 FL (ref 80–100)
PDW BLD-RTO: 13.7 % (ref 11.5–14.5)
PLATELET # BLD: 192 K/UL (ref 130–400)
RBC # BLD: 3.57 M/UL (ref 4.7–6.1)
WBC # BLD: 5.2 K/UL (ref 4.8–10.8)

## 2020-10-20 PROCEDURE — 2500000003 HC RX 250 WO HCPCS: Performed by: NURSE ANESTHETIST, CERTIFIED REGISTERED

## 2020-10-20 PROCEDURE — 2580000003 HC RX 258

## 2020-10-20 PROCEDURE — 43235 EGD DIAGNOSTIC BRUSH WASH: CPT | Performed by: INTERNAL MEDICINE

## 2020-10-20 PROCEDURE — 7100000010 HC PHASE II RECOVERY - FIRST 15 MIN: Performed by: INTERNAL MEDICINE

## 2020-10-20 PROCEDURE — 3700000000 HC ANESTHESIA ATTENDED CARE: Performed by: INTERNAL MEDICINE

## 2020-10-20 PROCEDURE — 45385 COLONOSCOPY W/LESION REMOVAL: CPT | Performed by: INTERNAL MEDICINE

## 2020-10-20 PROCEDURE — 6360000002 HC RX W HCPCS: Performed by: NURSE ANESTHETIST, CERTIFIED REGISTERED

## 2020-10-20 PROCEDURE — 45380 COLONOSCOPY AND BIOPSY: CPT | Performed by: INTERNAL MEDICINE

## 2020-10-20 PROCEDURE — 3700000001 HC ADD 15 MINUTES (ANESTHESIA): Performed by: INTERNAL MEDICINE

## 2020-10-20 PROCEDURE — 2709999900 HC NON-CHARGEABLE SUPPLY: Performed by: INTERNAL MEDICINE

## 2020-10-20 PROCEDURE — 7100000011 HC PHASE II RECOVERY - ADDTL 15 MIN: Performed by: INTERNAL MEDICINE

## 2020-10-20 PROCEDURE — 88305 TISSUE EXAM BY PATHOLOGIST: CPT

## 2020-10-20 PROCEDURE — 2580000003 HC RX 258: Performed by: INTERNAL MEDICINE

## 2020-10-20 PROCEDURE — 45381 COLONOSCOPY SUBMUCOUS NJX: CPT | Performed by: INTERNAL MEDICINE

## 2020-10-20 PROCEDURE — 3609017100 HC EGD: Performed by: INTERNAL MEDICINE

## 2020-10-20 PROCEDURE — 3609027000 HC COLONOSCOPY: Performed by: INTERNAL MEDICINE

## 2020-10-20 RX ORDER — PROPOFOL 10 MG/ML
INJECTION, EMULSION INTRAVENOUS PRN
Status: DISCONTINUED | OUTPATIENT
Start: 2020-10-20 | End: 2020-10-20 | Stop reason: SDUPTHER

## 2020-10-20 RX ORDER — LIDOCAINE HYDROCHLORIDE 20 MG/ML
INJECTION, SOLUTION INFILTRATION; PERINEURAL PRN
Status: DISCONTINUED | OUTPATIENT
Start: 2020-10-20 | End: 2020-10-20 | Stop reason: SDUPTHER

## 2020-10-20 RX ORDER — SODIUM CHLORIDE 9 MG/ML
INJECTION, SOLUTION INTRAVENOUS
Status: COMPLETED
Start: 2020-10-20 | End: 2020-10-20

## 2020-10-20 RX ORDER — SODIUM CHLORIDE 9 MG/ML
INJECTION, SOLUTION INTRAVENOUS CONTINUOUS
Status: DISCONTINUED | OUTPATIENT
Start: 2020-10-20 | End: 2020-10-20 | Stop reason: HOSPADM

## 2020-10-20 RX ORDER — MAGNESIUM HYDROXIDE 1200 MG/15ML
LIQUID ORAL PRN
Status: DISCONTINUED | OUTPATIENT
Start: 2020-10-20 | End: 2020-10-20 | Stop reason: ALTCHOICE

## 2020-10-20 RX ORDER — SIMETHICONE 20 MG/.3ML
EMULSION ORAL PRN
Status: DISCONTINUED | OUTPATIENT
Start: 2020-10-20 | End: 2020-10-20 | Stop reason: ALTCHOICE

## 2020-10-20 RX ADMIN — LIDOCAINE HYDROCHLORIDE 40 MG: 20 INJECTION, SOLUTION INFILTRATION; PERINEURAL at 13:50

## 2020-10-20 RX ADMIN — PROPOFOL 350 MG: 10 INJECTION, EMULSION INTRAVENOUS at 13:50

## 2020-10-20 RX ADMIN — SODIUM CHLORIDE: 9 INJECTION, SOLUTION INTRAVENOUS at 13:02

## 2020-10-20 ASSESSMENT — PAIN SCALES - GENERAL: PAINLEVEL_OUTOF10: 0

## 2020-10-20 ASSESSMENT — PULMONARY FUNCTION TESTS
PIF_VALUE: 0
PIF_VALUE: 1
PIF_VALUE: 1
PIF_VALUE: 0
PIF_VALUE: 1
PIF_VALUE: 1
PIF_VALUE: 0
PIF_VALUE: 1
PIF_VALUE: 0

## 2020-10-20 NOTE — ANESTHESIA PRE PROCEDURE
left kidney, Dr. Belkis Joya  01/2019       Social History:    Social History     Tobacco Use    Smoking status: Never Smoker    Smokeless tobacco: Never Used   Substance Use Topics    Alcohol use: Yes     Comment: Rarely beer                                Counseling given: Not Answered      Vital Signs (Current):   Vitals:    10/20/20 1256   BP: (!) 151/72   Pulse: 63   Resp: 16   Temp: 36.4 °C (97.6 °F)   TempSrc: Temporal   SpO2: 98%   Weight: 160 lb (72.6 kg)   Height: 5' 9\" (1.753 m)                                              BP Readings from Last 3 Encounters:   10/20/20 (!) 151/72   10/07/20 (!) 108/58   08/26/20 118/72       NPO Status: Time of last liquid consumption: 0800(water with pills)                        Time of last solid consumption: 1600                        Date of last liquid consumption: 10/20/20                        Date of last solid food consumption: 10/18/20    BMI:   Wt Readings from Last 3 Encounters:   10/20/20 160 lb (72.6 kg)   10/07/20 161 lb (73 kg)   08/26/20 161 lb (73 kg)     Body mass index is 23.63 kg/m². CBC:   Lab Results   Component Value Date    WBC 12.5 06/04/2020    RBC 3.12 06/04/2020    RBC 2.89 03/08/2019    HGB 9.7 06/04/2020    HCT 28.6 06/04/2020    MCV 92 06/04/2020    RDW 13.5 01/24/2019     06/04/2020       CMP:   Lab Results   Component Value Date     07/21/2020    K 3.9 07/21/2020    K 4.3 01/25/2018    CL 98 07/21/2020    CO2 25 07/21/2020    BUN 25 07/21/2020    CREATININE 1.89 07/21/2020    GFRAA 41.1 07/21/2020    AGRATIO 1.3 03/05/2019    LABGLOM 34.0 07/21/2020    GLUCOSE 111 07/21/2020    GLUCOSE 109 02/28/2020    PROT 5.4 03/05/2019    CALCIUM 9.0 07/21/2020    BILITOT 0.5 03/05/2019    ALKPHOS 58 03/05/2019    AST 15 03/05/2019    ALT 8 03/05/2019       POC Tests: No results for input(s): POCGLU, POCNA, POCK, POCCL, POCBUN, POCHEMO, POCHCT in the last 72 hours.     Coags:   Lab Results Component Value Date    PROTIME 11.7 05/30/2019    PROTIME 12.1 01/17/2019    INR 1.0 05/30/2019    APTT 25.0 10/10/2017       HCG (If Applicable): No results found for: PREGTESTUR, PREGSERUM, HCG, HCGQUANT     ABGs: No results found for: PHART, PO2ART, IJZ8ABW, XFD3XNU, BEART, S1WNFOHL     Type & Screen (If Applicable):  No results found for: LABABO, LABRH    Drug/Infectious Status (If Applicable):  No results found for: HIV, HEPCAB    COVID-19 Screening (If Applicable):   Lab Results   Component Value Date    COVID19 Not Detected 10/13/2020         Anesthesia Evaluation     Anesthesia Plan        MOSHE Nash CRNA   10/20/2020

## 2020-10-20 NOTE — ANESTHESIA POSTPROCEDURE EVALUATION
Department of Anesthesiology  Postprocedure Note    Patient: Eder Erwin  MRN: [id number]  YOB: 1934  Date of evaluation: 10/20/2020  Time:  2:31 PM     Procedure Summary     Date:  10/20/20 Room / Location:  [address] [place] [address]    Anesthesia Start:  1345 Anesthesia Stop:      Procedures:       EGD ESOPHAGOGASTRODUODENOSCOPY (N/A )      COLONOSCOPY DIAGNOSTIC (N/A ) Diagnosis:  (lower abdominal pain, constipation, dysphagia, GERD, R10.30, K59.00, R13.10, K21.9)    Surgeon:  Elsa Mendes MD Responsible Provider:  MOSHE Albarado CRNA    Anesthesia Type:  MAC ASA Status:  3          Anesthesia Type: MAC    Shamika Phase I: Shamika Score: 10    Shamika Phase II:      Last vitals: Reviewed and per EMR flowsheets.        Anesthesia Post Evaluation    Patient location during evaluation: PACU  Patient participation: complete - patient participated  Level of consciousness: awake and alert  Pain score: 1  Airway patency: patent  Nausea & Vomiting: no nausea and no vomiting  Complications: no  Cardiovascular status: hemodynamically stable  Respiratory status: acceptable and nasal cannula  Hydration status: euvolemic  Comments: Report to RN, normal sinus rhythm

## 2020-10-20 NOTE — H&P
Patient Name: Saturnino Wood  : 1934  MRN: [id number]  DATE: 10/20/20      ENDOSCOPY  History and Physical    Procedure:    [x] Diagnostic Colonoscopy       [] Screening Colonoscopy  [x] EGD      [] ERCP      [] EUS       [] Other    [x] Previous office notes/History and Physical reviewed from the patients chart. Please see EMR for further details of HPI. I have examined the patient's status immediately prior to the procedure and:      Indications/HPI:    [x]Abdominal Pain   []Cancer- GI/Lung  []Fhx of colon CA  []History of Polyps   []Ndiayes   []Melena  []Abnormal Imaging   [x]Dysphagia    []Persistent Pneumonia  []Anemia   []Food Impaction  []History of Polyps  []GI Bleed   []Pulmonary nodule/Mass  []Change in bowel habits  [x]Heartburn/Reflux  []Rectal Bleed (BRBPR)  []Chest Pain - Non Cardiac  []Heme (+) Stool  []Ulcers  []Constipation   []Hemoptysis   []Varices  []Diarrhea   []Hypoxemia  []Nausea/Vomiting   []Screening   []Crohns/Colitis  []Other:    Anesthesia:   [x] MAC [] Moderate Sedation   [] General   [] None     ROS: 12 pt Review of Symptoms was negative unless mentioned above    Medications:   Prior to Admission medications    Medication Sig Start Date End Date Taking?  Authorizing Provider   famotidine (PEPCID) 20 MG tablet Take 1 tablet by mouth 2 times daily 10/7/20  Yes MOSHE Mckeon CNP   omeprazole (PRILOSEC) 40 MG delayed release capsule Take 1 capsule by mouth every morning (before breakfast) 20  Yes MOSHE Mckeon CNP   carvedilol (COREG) 3.125 MG tablet Take 1 tablet by mouth 2 times daily 20  Yes Jessica Mclaughlin MD   apixaban (ELIQUIS) 2.5 MG TABS tablet Take 2.5 mg by mouth 2 times daily   Yes Historical Provider, MD   tamsulosin (FLOMAX) 0.4 MG capsule Take 0.4 mg by mouth daily   Yes Historical Provider, MD   simvastatin (ZOCOR) 20 MG tablet Take 1 tablet by mouth nightly 3/21/19  Yes MOSHE Orellana CNP   Multiple Vitamins-Minerals (EYE VITAMINS) CAPS Take 1 capsule by mouth daily 1/22/19  Yes Historical Provider, MD   ondansetron (ZOFRAN) 4 MG tablet TAKE 1 TO 2 TABLETS EVERY 4 TO 6 HOURS AS NEEDED FOR NAUSEA AND VOMITING 1/11/19  Yes Ashia Rubio MD   latanoprost (XALATAN) 0.005 % ophthalmic solution Place 1 drop into both eyes nightly   Yes Historical Provider, MD   midodrine (PROAMATINE) 2.5 MG tablet Take 1 tablet by mouth 3 times daily 7/21/20   FREDY Torres       Allergies:    Allergies   Allergen Reactions    Morphine      Other reaction(s): Delirium      History of allergic reaction to anesthesia:  No    Past Medical History:  Past Medical History:   Diagnosis Date    Abnormal finding on EKG 6/13/2016    Atrial fibrillation ([facility] [address] 1/22/2018    BPH (benign prostatic hypertrophy)     Cancer (HCC)     kidney - removed Left kidney    Colon polyps     Congestive heart failure ([facility] [address] 1/24/2019    GERD (gastroesophageal reflux disease)     Hyperlipidemia     Hypertension     Left bundle branch block 6/13/2016    Osteoarthritis     Sinus bradycardia on ECG 6/13/2016    Type II or unspecified type diabetes mellitus without mention of complication, not stated as uncontrolled      Past Surgical History:  Past Surgical History:   Procedure Laterality Date    ANGIOPLASTY  05/08/2018    percutaneous transluminal angioplasty of the SMA with stent implantation, Constantine Workman   [address] [facility] COLONOSCOPY  2016    CYSTOSCOPY  01/25/2019    DR BALBINA Reyna PACEMAKER INSERTION      [place] ESOPHAGOGASTRODUODENOSCOPY TRANSORAL DIAGNOSTIC N/A 6/5/2018    EGD ESOPHAGOGASTRODUODENOSCOPY performed by Eve Patel MD at [place] [id number]  08/12/2016    left kidney, Dr. Rosalind Johnson  01/2019     Social History:  Social History     Tobacco Use    Smoking status: Never Smoker    Smokeless tobacco: Never Used   Substance Use Topics  Alcohol use: Yes     Comment: Rarely beer    Drug use: No     Vital Signs:   Vitals:    10/20/20 1434   BP: 130/73   Pulse: 61   Resp: 16   Temp:    SpO2: 100%      Physical Exam:  Cardiac:  [x]WNL []Comments:  Pulmonary:  [x]WNL   []Comments:   Neuro/Mental Status:  [x]WNL  []Comments:  Abdominal:  [x]WNL    []Comments:  Other:   []WNL  []Comments:    Informed Consent:  The risks and benefits of the procedure have been discussed with either the patient or if they cannot consent, their representative. Assessment:  Patient examined and appropriate for planned sedation and procedure. Plan:  Proceed with planned sedation and procedure as above. The patient was counseled at length about risks of alyssia COVID-19 in the perioperative and any recovery window from the procedure. The patient was made aware that alyssia COVID-19 may worsen their prognosis for recovery from their procedure and lend to a higher morbidity and-all mortality risk. The patient was given the option of postponing the procedure all material risks, benefits, and alternatives were discussed. The patient does wish to proceed with the procedure at this time.     Christiano Qureshi MD  2:37 PM

## 2020-10-20 NOTE — ANESTHESIA PRE PROCEDURE
Department of Anesthesiology  Preprocedure Note       Name:  Saturnino Wood   Age:  80 y.o.  :  1934                                          MRN:  [id number]         Date:  10/20/2020      Surgeon: Elver Mazariegos):  Bradley Moreno MD    Procedure: Procedure(s):  EGD ESOPHAGOGASTRODUODENOSCOPY  COLONOSCOPY DIAGNOSTIC    Medications prior to admission:   Prior to Admission medications    Medication Sig Start Date End Date Taking? Authorizing Provider   famotidine (PEPCID) 20 MG tablet Take 1 tablet by mouth 2 times daily 10/7/20  Yes MOSHE Mckeon CNP   omeprazole (PRILOSEC) 40 MG delayed release capsule Take 1 capsule by mouth every morning (before breakfast) 20  Yes MOSHE Mckeon CNP   carvedilol (COREG) 3.125 MG tablet Take 1 tablet by mouth 2 times daily 20  Yes Jessica Mclaughlin MD   apixaban (ELIQUIS) 2.5 MG TABS tablet Take 2.5 mg by mouth 2 times daily   Yes Historical Provider, MD   tamsulosin (FLOMAX) 0.4 MG capsule Take 0.4 mg by mouth daily   Yes Historical Provider, MD   simvastatin (ZOCOR) 20 MG tablet Take 1 tablet by mouth nightly 3/21/19  Yes Mardell Emperor, APRN - CNP   Multiple Vitamins-Minerals (EYE VITAMINS) CAPS Take 1 capsule by mouth daily 19  Yes Historical Provider, MD   ondansetron (ZOFRAN) 4 MG tablet TAKE 1 TO 2 TABLETS EVERY 4 TO 6 HOURS AS NEEDED FOR NAUSEA AND VOMITING 19  Yes Tobias Anand MD   latanoprost (XALATAN) 0.005 % ophthalmic solution Place 1 drop into both eyes nightly   Yes Historical Provider, MD   midodrine (PROAMATINE) 2.5 MG tablet Take 1 tablet by mouth 3 times daily 20   FREDY Ledezma       Current medications:    Current Facility-Administered Medications   Medication Dose Route Frequency Provider Last Rate Last Dose    0.9 % sodium chloride infusion   Intravenous Continuous Bradley Moreno  mL/hr at 10/20/20 1302         Allergies:     Allergies   Allergen Reactions    Morphine Other reaction(s): Delirium       Problem List:    Patient Active Problem List   Diagnosis Code    Hypertension I10    Benign prostatic hyperplasia N40.0    GERD (gastroesophageal reflux disease) K21.9    Osteoarthritis M19.90    Type 2 diabetes mellitus without complication (HCC) C33.9    Hyperlipidemia E78.5    Bladder cancer (HCC) C67.9    Sinus bradycardia on ECG R00.1    Left bundle branch block I44.7    Abnormal finding on EKG R94.31    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    Hypogonadism in male E29.1    Atrial fibrillation (HCC) I48.91    Abdominal pain R10.9    Mesenteric ischemia (HCC) K55.9    Epigastric abdominal pain R10.13    Nausea R11.0    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 K29.40    Urothelial carcinoma (HCC) C68.9    Chronic kidney disease, stage III (moderate) N18.30    Congestive heart failure (HCC) I50.9       Past Medical History:        Diagnosis Date    Abnormal finding on EKG 6/13/2016    Atrial fibrillation ([address] 1/22/2018    BPH (benign prostatic hypertrophy)     Cancer (HCC)     kidney - removed Left kidney    Colon polyps     Congestive heart failure (HCC) 1/24/2019    GERD (gastroesophageal reflux disease)     Hyperlipidemia     Hypertension     Left bundle branch block 6/13/2016    Osteoarthritis     Sinus bradycardia on ECG 6/13/2016    Type II or unspecified type diabetes mellitus without mention of complication, not stated as uncontrolled        Past Surgical History:        Procedure Laterality Date    ANGIOPLASTY  05/08/2018    percutaneous transluminal angioplasty of the SMA with stent implantation, [facility] CARDIAC DEFIBRILLATOR PLACEMENT      COLONOSCOPY  2016    [facility]    PACEMAKER INSERTION      [place] ESOPHAGOGASTRODUODENOSCOPY TRANSORAL DIAGNOSTIC N/A 6/5/2018    EGD ESOPHAGOGASTRODUODENOSCOPY performed by Carolyn Moy MD at [place] [address]  08/12/2016 left kidney, Dr. Holley Trevino  01/2019       Social History:    Social History     Tobacco Use    Smoking status: Never Smoker    Smokeless tobacco: Never Used   Substance Use Topics    Alcohol use: Yes     Comment: Rarely beer                                Counseling given: Not Answered      Vital Signs (Current):   Vitals:    10/20/20 1256   BP: (!) 151/72   Pulse: 63   Resp: 16   Temp: 36.4 °C (97.6 °F)   TempSrc: Temporal   SpO2: 98%   Weight: 160 lb (72.6 kg)   Height: 5' 9\" (1.753 m)                                              BP Readings from Last 3 Encounters:   10/20/20 (!) 151/72   10/07/20 (!) 108/58   08/26/20 118/72       NPO Status: Time of last liquid consumption: 0800(water with pills)                        Time of last solid consumption: 1600                        Date of last liquid consumption: 10/20/20                        Date of last solid food consumption: 10/18/20    BMI:   Wt Readings from Last 3 Encounters:   10/20/20 160 lb (72.6 kg)   10/07/20 161 lb (73 kg)   08/26/20 161 lb (73 kg)     Body mass index is 23.63 kg/m². CBC:   Lab Results   Component Value Date    WBC 12.5 06/04/2020    RBC 3.12 06/04/2020    RBC 2.89 03/08/2019    HGB 9.7 06/04/2020    HCT 28.6 06/04/2020    MCV 92 06/04/2020    RDW 13.5 01/24/2019     06/04/2020       CMP:   Lab Results   Component Value Date     07/21/2020    K 3.9 07/21/2020    K 4.3 01/25/2018    CL 98 07/21/2020    CO2 25 07/21/2020    BUN 25 07/21/2020    CREATININE 1.89 07/21/2020    GFRAA 41.1 07/21/2020    AGRATIO 1.3 03/05/2019    LABGLOM 34.0 07/21/2020    GLUCOSE 111 07/21/2020    GLUCOSE 109 02/28/2020    PROT 5.4 03/05/2019    CALCIUM 9.0 07/21/2020    BILITOT 0.5 03/05/2019    ALKPHOS 58 03/05/2019    AST 15 03/05/2019    ALT 8 03/05/2019       POC Tests: No results for input(s): POCGLU, POCNA, POCK, POCCL, POCBUN, POCHEMO, POCHCT in the last 72 hours.     Coags:   Lab Results

## 2020-10-27 ENCOUNTER — OFFICE VISIT (OUTPATIENT)
Dept: SURGERY | Age: 85
End: 2020-10-27
Payer: MEDICARE

## 2020-10-27 VITALS
HEART RATE: 84 BPM | BODY MASS INDEX: 23.7 KG/M2 | OXYGEN SATURATION: 97 % | WEIGHT: 160 LBS | HEIGHT: 69 IN | TEMPERATURE: 96.9 F

## 2020-10-27 PROCEDURE — 99204 OFFICE O/P NEW MOD 45 MIN: CPT | Performed by: COLON & RECTAL SURGERY

## 2020-10-27 ASSESSMENT — ENCOUNTER SYMPTOMS
DIARRHEA: 0
COLOR CHANGE: 0
CONSTIPATION: 0
BLOOD IN STOOL: 0
ANAL BLEEDING: 0
ABDOMINAL PAIN: 1
ABDOMINAL DISTENTION: 0
APNEA: 0
VOMITING: 0
SHORTNESS OF BREATH: 0

## 2020-10-27 NOTE — PROGRESS NOTES
Subjective:      Patient ID: Arlen Butterfield is a 80 y.o. male who presents for:  Chief Complaint   Patient presents with    New Patient       This is an 15-year-old male who was having abdominal pain and had a colonoscopy by Dr. Jorge Pisano. He was found to have an area in the cecum behind a fold which appeared to be malignant. 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 I reviewed the colonoscopy report. The area was tattooed by Dr. Jorge Pisano for intraoperative localization. Number of other benign polyps were removed from around the ascending colon. His CEA level was normal.  He had a CT scan which was unremarkable for any colonic pathology. He has 1 kidney and currently has a stent in place which gets changed periodically.   He is currently on Eliquis      Past Medical History:   Diagnosis Date    Abnormal finding on EKG 6/13/2016    Atrial fibrillation (HCC) 1/22/2018    BPH (benign prostatic hypertrophy)     Cancer (HCC)     kidney - removed Left kidney    Colon polyps     Congestive heart failure ([address] 1/24/2019    GERD (gastroesophageal reflux disease)     Hyperlipidemia     Hypertension     Left bundle branch block 6/13/2016    Osteoarthritis     Sinus bradycardia on ECG 6/13/2016    Type II or unspecified type diabetes mellitus without mention of complication, not stated as uncontrolled      Past Surgical History:   Procedure Laterality Date    ANGIOPLASTY  05/08/2018    percutaneous transluminal angioplasty of the SMA with stent implantation, [place] [id number] [facility] COLONOSCOPY  2016    COLONOSCOPY N/A 10/20/2020    COLONOSCOPY DIAGNOSTIC performed by Abdulkadir Bowman MD at [place] [address]  01/25/2019     [facility]      [place] ESOPHAGOGASTRODUODENOSCOPY TRANSORAL DIAGNOSTIC N/A 6/5/2018    EGD ESOPHAGOGASTRODUODENOSCOPY performed [facility]ir [facility] at [facility]    TOTAL NEPHRECTOMY  08/12/2016    left kidney, Dr. Gustavo Vicente N/A 10/20/2020    EGD ESOPHAGOGASTRODUODENOSCOPY performed by Raleigh Howard MD at [place] [address]  01/2019     Social History     Socioeconomic History    Marital status:      Spouse name: Not on file    Number of children: Not on file    Years of education: Not on file    Highest education level: Not on file   Occupational History    Not on file   Social Needs    Financial resource strain: Not on file    Food insecurity     Worry: Not on file     Inability: Not on file    Transportation needs     Medical: Not on file     Non-medical: Not on file   Tobacco Use    Smoking status: Never Smoker    Smokeless tobacco: Never Used   Substance and Sexual Activity    Alcohol use: Yes     Comment: Rarely beer    Drug use: No    Sexual activity: Not on file   Lifestyle    Physical activity     Days per week: Not on file     Minutes per session: Not on file    Stress: Not on file   Relationships    Social connections     Talks on phone: Not on file     Gets together: Not on file     Attends Voodoo service: Not on file     Active member of club or organization: Not on file     Attends meetings of clubs or organizations: Not on file     Relationship status: Not on file    Intimate partner violence     Fear of current or ex partner: Not on file     Emotionally abused: Not on file     Physically abused: Not on file     Forced sexual activity: Not on file   Other Topics Concern    Not on file   Social History Narrative    Not on file     Family History   Problem Relation Age of Onset    Heart Attack Father     Heart Attack Brother      Allergies:  Morphine    Review of Systems   Constitutional: Negative for activity change, appetite change and fatigue. HENT: Negative for congestion. Respiratory: Negative for apnea and shortness of breath. Cardiovascular: Negative for chest pain and palpitations. Gastrointestinal: Positive for abdominal pain. Negative for abdominal distention, anal bleeding, blood in stool, constipation, diarrhea and vomiting. Genitourinary: Negative for difficulty urinating. Musculoskeletal: Negative for arthralgias. Skin: Negative for color change. Neurological: Negative for dizziness and headaches. Hematological: Does not bruise/bleed easily. Psychiatric/Behavioral: Negative for agitation and confusion. Objective:    Pulse 84   Temp 96.9 °F (36.1 °C) (Temporal)   Ht 5' 9\" (1.753 m)   Wt 160 lb (72.6 kg)   SpO2 97%   BMI 23.63 kg/m²     Physical Exam  Constitutional:       General: He is not in acute distress. Appearance: He is well-developed. He is not diaphoretic. HENT:      Head: Normocephalic and atraumatic. Eyes:      Pupils: Pupils are equal, round, and reactive to light. Neck:      Musculoskeletal: Normal range of motion. Cardiovascular:      Rate and Rhythm: Normal rate and regular rhythm. Heart sounds: Normal heart sounds. Pulmonary:      Effort: Pulmonary effort is normal. No respiratory distress. Breath sounds: Normal breath sounds. No wheezing. Abdominal:      General: There is no distension. Palpations: Abdomen is soft. Tenderness: There is no abdominal tenderness. There is no guarding. Hernia: No hernia is present. Musculoskeletal: Normal range of motion. General: No tenderness. Skin:     General: Skin is warm and dry. Findings: No erythema or rash. Neurological:      Mental Status: He is alert and oriented to person, place, and time. Psychiatric:         Behavior: Behavior normal.         Thought Content: Thought content normal.         Judgment: Judgment normal.              Assessment/Plan:          Diagnosis Orders   1.  Colonic mass       I discussed by showing endoscopic and CT images along with anatomic diagrams of the colon, the risks and benefits of laparoscopic right colectomy for definitive surgical treatment and histologic evaluation. Risks of the procedure were described including infection, bleeding, damage to the surrounding intestine, anastomotic leak, reoperation, as well as other complications such as arrhythmia heart attack stroke and even potential perioperative death. Despite these risks, he wishes to proceed. Consent obtained. Eliquis to be held 5 days prior to surgery. Bowel prep instructions given. Preadmit testing for coronavirus screening given as well. The patient was counseled at length about the risks of alyssia Covid-19 during their perioperative period and any recovery window from their procedure. The patient was made aware that alyssia Covid-19  may worsen their prognosis for recovering from their procedure  and lend to a higher morbidity and/or mortality risk. All material risks, benefits, and reasonable alternatives including postponing the procedure were discussed. The patient does wish to proceed with the procedure at this time. Please note this report has beenpartially produced using speech recognition software and may cause contain errors related to that system including grammar, punctuation and spelling as well as words and phrases that may seem inappropriate.  If there arequestions or concerns please feel free to contact me to clarify

## 2020-10-29 ENCOUNTER — NURSE ONLY (OUTPATIENT)
Dept: PRIMARY CARE CLINIC | Age: 85
End: 2020-10-29

## 2020-10-29 ENCOUNTER — HOSPITAL ENCOUNTER (OUTPATIENT)
Dept: PREADMISSION TESTING | Age: 85
Discharge: HOME | End: 2020-11-02
Payer: MEDICARE

## 2020-10-29 VITALS
TEMPERATURE: 97.6 F | RESPIRATION RATE: 16 BRPM | OXYGEN SATURATION: 98 % | HEIGHT: 69 IN | SYSTOLIC BLOOD PRESSURE: 142 MMHG | BODY MASS INDEX: 23.76 KG/M2 | DIASTOLIC BLOOD PRESSURE: 81 MMHG | WEIGHT: 160.4 LBS | HEART RATE: 76 BPM

## 2020-10-29 LAB
ABO/RH: NORMAL
ANION GAP SERPL CALCULATED.3IONS-SCNC: 12 MEQ/L (ref 9–15)
ANTIBODY SCREEN: NORMAL
BUN BLDV-MCNC: 27 MG/DL (ref 8–23)
CALCIUM SERPL-MCNC: 9 MG/DL (ref 8.5–9.9)
CHLORIDE BLD-SCNC: 104 MEQ/L (ref 95–107)
CO2: 24 MEQ/L (ref 20–31)
CREAT SERPL-MCNC: 1.37 MG/DL (ref 0.7–1.2)
GFR AFRICAN AMERICAN: 59.6
GFR NON-AFRICAN AMERICAN: 49.2
GLUCOSE BLD-MCNC: 85 MG/DL (ref 70–99)
HCT VFR BLD CALC: 32.1 % (ref 42–52)
HEMOGLOBIN: 10.9 G/DL (ref 14–18)
MCH RBC QN AUTO: 31 PG (ref 27–31.3)
MCHC RBC AUTO-ENTMCNC: 33.9 % (ref 33–37)
MCV RBC AUTO: 91.4 FL (ref 80–100)
PDW BLD-RTO: 13.9 % (ref 11.5–14.5)
PLATELET # BLD: 187 K/UL (ref 130–400)
POTASSIUM SERPL-SCNC: 4.4 MEQ/L (ref 3.4–4.9)
RBC # BLD: 3.52 M/UL (ref 4.7–6.1)
SODIUM BLD-SCNC: 140 MEQ/L (ref 135–144)
WBC # BLD: 4.5 K/UL (ref 4.8–10.8)

## 2020-10-29 PROCEDURE — 86900 BLOOD TYPING SEROLOGIC ABO: CPT

## 2020-10-29 PROCEDURE — 85027 COMPLETE CBC AUTOMATED: CPT

## 2020-10-29 PROCEDURE — 86901 BLOOD TYPING SEROLOGIC RH(D): CPT

## 2020-10-29 PROCEDURE — 80048 BASIC METABOLIC PNL TOTAL CA: CPT

## 2020-10-29 PROCEDURE — U0003 INFECTIOUS AGENT DETECTION BY NUCLEIC ACID (DNA OR RNA); SEVERE ACUTE RESPIRATORY SYNDROME CORONAVIRUS 2 (SARS-COV-2) (CORONAVIRUS DISEASE [COVID-19]), AMPLIFIED PROBE TECHNIQUE, MAKING USE OF HIGH THROUGHPUT TECHNOLOGIES AS DESCRIBED BY CMS-2020-01-R: HCPCS

## 2020-10-29 PROCEDURE — 86850 RBC ANTIBODY SCREEN: CPT

## 2020-10-29 RX ORDER — CIPROFLOXACIN 2 MG/ML
400 INJECTION, SOLUTION INTRAVENOUS
Status: CANCELLED | OUTPATIENT
Start: 2020-11-04 | End: 2020-11-04

## 2020-10-29 RX ORDER — SODIUM CHLORIDE, SODIUM LACTATE, POTASSIUM CHLORIDE, CALCIUM CHLORIDE 600; 310; 30; 20 MG/100ML; MG/100ML; MG/100ML; MG/100ML
INJECTION, SOLUTION INTRAVENOUS CONTINUOUS
Status: CANCELLED | OUTPATIENT
Start: 2020-11-04

## 2020-10-29 RX ORDER — SODIUM CHLORIDE 0.9 % (FLUSH) 0.9 %
10 SYRINGE (ML) INJECTION EVERY 12 HOURS SCHEDULED
Status: CANCELLED | OUTPATIENT
Start: 2020-11-04

## 2020-10-29 RX ORDER — LIDOCAINE HYDROCHLORIDE 10 MG/ML
1 INJECTION, SOLUTION EPIDURAL; INFILTRATION; INTRACAUDAL; PERINEURAL
Status: CANCELLED | OUTPATIENT
Start: 2020-11-04 | End: 2020-11-04

## 2020-10-29 RX ORDER — SODIUM CHLORIDE 0.9 % (FLUSH) 0.9 %
10 SYRINGE (ML) INJECTION PRN
Status: CANCELLED | OUTPATIENT
Start: 2020-11-04

## 2020-10-29 NOTE — PROGRESS NOTES
Yellow PAT instruction and Dr Hugo Islas bowel prep sheets reviewed with patient. Patient needs much direction, and reviewed the instructions several times. The patient does not know meds,and doseages. Patient is going to have Viral Moon call and I will review all with her. Sent for COVID test and will self Quarantine until [employer].

## 2020-10-31 LAB
SARS-COV-2: NOT DETECTED
SOURCE: NORMAL

## 2020-11-02 NOTE — PROGRESS NOTES
Spoke with Dilip Bejarano by phone which is Patients caregiver/ [place], reviewed Mackenzie LOPEZ and Dr. Elena Spencer prep instructions, and patient meds.

## 2020-11-04 ENCOUNTER — ANESTHESIA (OUTPATIENT)
Dept: OPERATING ROOM | Age: 85
DRG: 330 | End: 2020-11-04
Payer: MEDICARE

## 2020-11-04 ENCOUNTER — ANESTHESIA EVENT (OUTPATIENT)
Dept: OPERATING ROOM | Age: 85
DRG: 330 | End: 2020-11-04
Payer: MEDICARE

## 2020-11-04 ENCOUNTER — HOSPITAL ENCOUNTER (INPATIENT)
Age: 85
LOS: 6 days | Discharge: NURSING, REHAB OR LONG-TERM CARE | DRG: 330 | End: 2020-11-10
Attending: COLON & RECTAL SURGERY | Admitting: COLON & RECTAL SURGERY
Payer: MEDICARE

## 2020-11-04 VITALS — SYSTOLIC BLOOD PRESSURE: 118 MMHG | OXYGEN SATURATION: 100 % | TEMPERATURE: 95 F | DIASTOLIC BLOOD PRESSURE: 59 MMHG

## 2020-11-04 PROBLEM — C18.9 COLON CANCER (HCC): Status: ACTIVE | Noted: 2020-11-04

## 2020-11-04 PROBLEM — H40.9 GLAUCOMA: Status: ACTIVE | Noted: 2020-11-04

## 2020-11-04 PROBLEM — I42.9 CARDIOMYOPATHY (HCC): Status: ACTIVE | Noted: 2020-11-04

## 2020-11-04 PROCEDURE — 2709999900 HC NON-CHARGEABLE SUPPLY: Performed by: COLON & RECTAL SURGERY

## 2020-11-04 PROCEDURE — 88341 IMHCHEM/IMCYTCHM EA ADD ANTB: CPT

## 2020-11-04 PROCEDURE — 0DTF4ZZ RESECTION OF RIGHT LARGE INTESTINE, PERCUTANEOUS ENDOSCOPIC APPROACH: ICD-10-PCS | Performed by: COLON & RECTAL SURGERY

## 2020-11-04 PROCEDURE — 2580000003 HC RX 258: Performed by: NURSE PRACTITIONER

## 2020-11-04 PROCEDURE — 2580000003 HC RX 258: Performed by: NURSE ANESTHETIST, CERTIFIED REGISTERED

## 2020-11-04 PROCEDURE — 6360000002 HC RX W HCPCS: Performed by: STUDENT IN AN ORGANIZED HEALTH CARE EDUCATION/TRAINING PROGRAM

## 2020-11-04 PROCEDURE — 44205 LAP COLECTOMY PART W/ILEUM: CPT | Performed by: COLON & RECTAL SURGERY

## 2020-11-04 PROCEDURE — 00HU33Z INSERTION OF INFUSION DEVICE INTO SPINAL CANAL, PERCUTANEOUS APPROACH: ICD-10-PCS | Performed by: COLON & RECTAL SURGERY

## 2020-11-04 PROCEDURE — 2580000003 HC RX 258: Performed by: COLON & RECTAL SURGERY

## 2020-11-04 PROCEDURE — 0DQV0ZZ REPAIR MESENTERY, OPEN APPROACH: ICD-10-PCS | Performed by: COLON & RECTAL SURGERY

## 2020-11-04 PROCEDURE — 6360000002 HC RX W HCPCS: Performed by: COLON & RECTAL SURGERY

## 2020-11-04 PROCEDURE — 1210000000 HC MED SURG R&B

## 2020-11-04 PROCEDURE — 2500000003 HC RX 250 WO HCPCS: Performed by: STUDENT IN AN ORGANIZED HEALTH CARE EDUCATION/TRAINING PROGRAM

## 2020-11-04 PROCEDURE — 3600000014 HC SURGERY LEVEL 4 ADDTL 15MIN: Performed by: COLON & RECTAL SURGERY

## 2020-11-04 PROCEDURE — 2720000010 HC SURG SUPPLY STERILE: Performed by: COLON & RECTAL SURGERY

## 2020-11-04 PROCEDURE — 3700000001 HC ADD 15 MINUTES (ANESTHESIA): Performed by: COLON & RECTAL SURGERY

## 2020-11-04 PROCEDURE — 88342 IMHCHEM/IMCYTCHM 1ST ANTB: CPT

## 2020-11-04 PROCEDURE — 62325 NJX INTERLAMINAR CRV/THRC: CPT | Performed by: NURSE ANESTHETIST, CERTIFIED REGISTERED

## 2020-11-04 PROCEDURE — 0D1B0ZH BYPASS ILEUM TO CECUM, OPEN APPROACH: ICD-10-PCS | Performed by: COLON & RECTAL SURGERY

## 2020-11-04 PROCEDURE — 6360000002 HC RX W HCPCS: Performed by: NURSE ANESTHETIST, CERTIFIED REGISTERED

## 2020-11-04 PROCEDURE — 3600000004 HC SURGERY LEVEL 4 BASE: Performed by: COLON & RECTAL SURGERY

## 2020-11-04 PROCEDURE — 2500000003 HC RX 250 WO HCPCS: Performed by: NURSE ANESTHETIST, CERTIFIED REGISTERED

## 2020-11-04 PROCEDURE — 7100000000 HC PACU RECOVERY - FIRST 15 MIN: Performed by: COLON & RECTAL SURGERY

## 2020-11-04 PROCEDURE — 3700000000 HC ANESTHESIA ATTENDED CARE: Performed by: COLON & RECTAL SURGERY

## 2020-11-04 PROCEDURE — 2580000003 HC RX 258: Performed by: STUDENT IN AN ORGANIZED HEALTH CARE EDUCATION/TRAINING PROGRAM

## 2020-11-04 PROCEDURE — 88309 TISSUE EXAM BY PATHOLOGIST: CPT

## 2020-11-04 PROCEDURE — 6370000000 HC RX 637 (ALT 250 FOR IP): Performed by: COLON & RECTAL SURGERY

## 2020-11-04 PROCEDURE — 7100000001 HC PACU RECOVERY - ADDTL 15 MIN: Performed by: COLON & RECTAL SURGERY

## 2020-11-04 RX ORDER — ONDANSETRON 2 MG/ML
4 INJECTION INTRAMUSCULAR; INTRAVENOUS EVERY 6 HOURS PRN
Status: DISCONTINUED | OUTPATIENT
Start: 2020-11-04 | End: 2020-11-10 | Stop reason: HOSPADM

## 2020-11-04 RX ORDER — SODIUM CHLORIDE, SODIUM LACTATE, POTASSIUM CHLORIDE, CALCIUM CHLORIDE 600; 310; 30; 20 MG/100ML; MG/100ML; MG/100ML; MG/100ML
INJECTION, SOLUTION INTRAVENOUS CONTINUOUS
Status: DISCONTINUED | OUTPATIENT
Start: 2020-11-04 | End: 2020-11-04

## 2020-11-04 RX ORDER — FENTANYL CITRATE 50 UG/ML
INJECTION, SOLUTION INTRAMUSCULAR; INTRAVENOUS PRN
Status: DISCONTINUED | OUTPATIENT
Start: 2020-11-04 | End: 2020-11-04 | Stop reason: SDUPTHER

## 2020-11-04 RX ORDER — SODIUM CHLORIDE, SODIUM LACTATE, POTASSIUM CHLORIDE, CALCIUM CHLORIDE 600; 310; 30; 20 MG/100ML; MG/100ML; MG/100ML; MG/100ML
INJECTION, SOLUTION INTRAVENOUS CONTINUOUS PRN
Status: DISCONTINUED | OUTPATIENT
Start: 2020-11-04 | End: 2020-11-04 | Stop reason: SDUPTHER

## 2020-11-04 RX ORDER — TAMSULOSIN HYDROCHLORIDE 0.4 MG/1
0.4 CAPSULE ORAL DAILY
Status: DISCONTINUED | OUTPATIENT
Start: 2020-11-04 | End: 2020-11-10 | Stop reason: HOSPADM

## 2020-11-04 RX ORDER — LATANOPROST 50 UG/ML
1 SOLUTION/ DROPS OPHTHALMIC NIGHTLY
Status: DISCONTINUED | OUTPATIENT
Start: 2020-11-05 | End: 2020-11-10 | Stop reason: HOSPADM

## 2020-11-04 RX ORDER — SODIUM CHLORIDE 0.9 % (FLUSH) 0.9 %
10 SYRINGE (ML) INJECTION EVERY 12 HOURS SCHEDULED
Status: DISCONTINUED | OUTPATIENT
Start: 2020-11-04 | End: 2020-11-04 | Stop reason: HOSPADM

## 2020-11-04 RX ORDER — ONDANSETRON 2 MG/ML
4 INJECTION INTRAMUSCULAR; INTRAVENOUS EVERY 6 HOURS PRN
Status: DISCONTINUED | OUTPATIENT
Start: 2020-11-04 | End: 2020-11-06

## 2020-11-04 RX ORDER — BUPIVACAINE HYDROCHLORIDE 2.5 MG/ML
INJECTION, SOLUTION EPIDURAL; INFILTRATION; INTRACAUDAL PRN
Status: DISCONTINUED | OUTPATIENT
Start: 2020-11-04 | End: 2020-11-04 | Stop reason: SDUPTHER

## 2020-11-04 RX ORDER — CIPROFLOXACIN 2 MG/ML
400 INJECTION, SOLUTION INTRAVENOUS
Status: COMPLETED | OUTPATIENT
Start: 2020-11-04 | End: 2020-11-04

## 2020-11-04 RX ORDER — MAGNESIUM HYDROXIDE 1200 MG/15ML
LIQUID ORAL CONTINUOUS PRN
Status: COMPLETED | OUTPATIENT
Start: 2020-11-04 | End: 2020-11-04

## 2020-11-04 RX ORDER — PROMETHAZINE HYDROCHLORIDE 12.5 MG/1
12.5 TABLET ORAL EVERY 6 HOURS PRN
Status: DISCONTINUED | OUTPATIENT
Start: 2020-11-04 | End: 2020-11-10 | Stop reason: HOSPADM

## 2020-11-04 RX ORDER — SODIUM CHLORIDE 0.9 % (FLUSH) 0.9 %
10 SYRINGE (ML) INJECTION PRN
Status: DISCONTINUED | OUTPATIENT
Start: 2020-11-04 | End: 2020-11-04 | Stop reason: HOSPADM

## 2020-11-04 RX ORDER — SODIUM CHLORIDE 9 MG/ML
INJECTION, SOLUTION INTRAVENOUS CONTINUOUS
Status: DISCONTINUED | OUTPATIENT
Start: 2020-11-04 | End: 2020-11-09

## 2020-11-04 RX ORDER — DEXAMETHASONE SODIUM PHOSPHATE 4 MG/ML
INJECTION, SOLUTION INTRA-ARTICULAR; INTRALESIONAL; INTRAMUSCULAR; INTRAVENOUS; SOFT TISSUE PRN
Status: DISCONTINUED | OUTPATIENT
Start: 2020-11-04 | End: 2020-11-04 | Stop reason: SDUPTHER

## 2020-11-04 RX ORDER — ONDANSETRON 2 MG/ML
4 INJECTION INTRAMUSCULAR; INTRAVENOUS
Status: ACTIVE | OUTPATIENT
Start: 2020-11-04 | End: 2020-11-04

## 2020-11-04 RX ORDER — DIPHENHYDRAMINE HCL 25 MG
25 TABLET ORAL EVERY 6 HOURS PRN
Status: DISCONTINUED | OUTPATIENT
Start: 2020-11-04 | End: 2020-11-06

## 2020-11-04 RX ORDER — NALBUPHINE HCL 10 MG/ML
5 AMPUL (ML) INJECTION EVERY 4 HOURS PRN
Status: DISCONTINUED | OUTPATIENT
Start: 2020-11-04 | End: 2020-11-06

## 2020-11-04 RX ORDER — LIDOCAINE HYDROCHLORIDE AND EPINEPHRINE 15; 5 MG/ML; UG/ML
INJECTION, SOLUTION EPIDURAL PRN
Status: DISCONTINUED | OUTPATIENT
Start: 2020-11-04 | End: 2020-11-04 | Stop reason: SDUPTHER

## 2020-11-04 RX ORDER — MIDAZOLAM HYDROCHLORIDE 1 MG/ML
INJECTION INTRAMUSCULAR; INTRAVENOUS PRN
Status: DISCONTINUED | OUTPATIENT
Start: 2020-11-04 | End: 2020-11-04 | Stop reason: SDUPTHER

## 2020-11-04 RX ORDER — ROCURONIUM BROMIDE 10 MG/ML
INJECTION, SOLUTION INTRAVENOUS PRN
Status: DISCONTINUED | OUTPATIENT
Start: 2020-11-04 | End: 2020-11-04 | Stop reason: SDUPTHER

## 2020-11-04 RX ORDER — DIPHENHYDRAMINE HYDROCHLORIDE 50 MG/ML
12.5 INJECTION INTRAMUSCULAR; INTRAVENOUS
Status: ACTIVE | OUTPATIENT
Start: 2020-11-04 | End: 2020-11-04

## 2020-11-04 RX ORDER — SODIUM CHLORIDE 0.9 % (FLUSH) 0.9 %
10 SYRINGE (ML) INJECTION PRN
Status: DISCONTINUED | OUTPATIENT
Start: 2020-11-04 | End: 2020-11-10 | Stop reason: HOSPADM

## 2020-11-04 RX ORDER — ONDANSETRON 2 MG/ML
INJECTION INTRAMUSCULAR; INTRAVENOUS PRN
Status: DISCONTINUED | OUTPATIENT
Start: 2020-11-04 | End: 2020-11-04 | Stop reason: SDUPTHER

## 2020-11-04 RX ORDER — SODIUM CHLORIDE 0.9 % (FLUSH) 0.9 %
10 SYRINGE (ML) INJECTION EVERY 12 HOURS SCHEDULED
Status: DISCONTINUED | OUTPATIENT
Start: 2020-11-04 | End: 2020-11-10 | Stop reason: HOSPADM

## 2020-11-04 RX ORDER — FENTANYL CITRATE 50 UG/ML
50 INJECTION, SOLUTION INTRAMUSCULAR; INTRAVENOUS EVERY 10 MIN PRN
Status: DISCONTINUED | OUTPATIENT
Start: 2020-11-04 | End: 2020-11-06

## 2020-11-04 RX ORDER — MEPERIDINE HYDROCHLORIDE 25 MG/ML
12.5 INJECTION INTRAMUSCULAR; INTRAVENOUS; SUBCUTANEOUS EVERY 5 MIN PRN
Status: DISCONTINUED | OUTPATIENT
Start: 2020-11-04 | End: 2020-11-06

## 2020-11-04 RX ORDER — NALOXONE HYDROCHLORIDE 0.4 MG/ML
0.4 INJECTION, SOLUTION INTRAMUSCULAR; INTRAVENOUS; SUBCUTANEOUS PRN
Status: DISCONTINUED | OUTPATIENT
Start: 2020-11-04 | End: 2020-11-06

## 2020-11-04 RX ORDER — PROPOFOL 10 MG/ML
INJECTION, EMULSION INTRAVENOUS PRN
Status: DISCONTINUED | OUTPATIENT
Start: 2020-11-04 | End: 2020-11-04 | Stop reason: SDUPTHER

## 2020-11-04 RX ORDER — LIDOCAINE HYDROCHLORIDE 20 MG/ML
INJECTION, SOLUTION INTRAVENOUS PRN
Status: DISCONTINUED | OUTPATIENT
Start: 2020-11-04 | End: 2020-11-04 | Stop reason: SDUPTHER

## 2020-11-04 RX ORDER — LIDOCAINE HYDROCHLORIDE 10 MG/ML
1 INJECTION, SOLUTION EPIDURAL; INFILTRATION; INTRACAUDAL; PERINEURAL
Status: DISCONTINUED | OUTPATIENT
Start: 2020-11-04 | End: 2020-11-04 | Stop reason: HOSPADM

## 2020-11-04 RX ORDER — DIPHENHYDRAMINE HYDROCHLORIDE 50 MG/ML
25 INJECTION INTRAMUSCULAR; INTRAVENOUS EVERY 6 HOURS PRN
Status: DISCONTINUED | OUTPATIENT
Start: 2020-11-04 | End: 2020-11-06

## 2020-11-04 RX ORDER — METOCLOPRAMIDE HYDROCHLORIDE 5 MG/ML
10 INJECTION INTRAMUSCULAR; INTRAVENOUS
Status: ACTIVE | OUTPATIENT
Start: 2020-11-04 | End: 2020-11-04

## 2020-11-04 RX ADMIN — SODIUM CHLORIDE: 9 INJECTION, SOLUTION INTRAVENOUS at 17:21

## 2020-11-04 RX ADMIN — SODIUM CHLORIDE, POTASSIUM CHLORIDE, SODIUM LACTATE AND CALCIUM CHLORIDE: 600; 310; 30; 20 INJECTION, SOLUTION INTRAVENOUS at 10:16

## 2020-11-04 RX ADMIN — BUPIVACAINE HYDROCHLORIDE 6 ML: 2.5 INJECTION, SOLUTION EPIDURAL; INFILTRATION; INTRACAUDAL; PERINEURAL at 13:00

## 2020-11-04 RX ADMIN — CIPROFLOXACIN 400 MG: 2 INJECTION, SOLUTION INTRAVENOUS at 12:37

## 2020-11-04 RX ADMIN — FENTANYL CITRATE 100 MCG: 50 INJECTION, SOLUTION INTRAMUSCULAR; INTRAVENOUS at 13:00

## 2020-11-04 RX ADMIN — SODIUM CHLORIDE, POTASSIUM CHLORIDE, SODIUM LACTATE AND CALCIUM CHLORIDE: 600; 310; 30; 20 INJECTION, SOLUTION INTRAVENOUS at 14:09

## 2020-11-04 RX ADMIN — Medication 10 ML: at 23:23

## 2020-11-04 RX ADMIN — DEXAMETHASONE SODIUM PHOSPHATE 4 MG: 4 INJECTION INTRA-ARTICULAR; INTRALESIONAL; INTRAMUSCULAR; INTRAVENOUS; SOFT TISSUE at 13:00

## 2020-11-04 RX ADMIN — ROCURONIUM BROMIDE 20 MG: 10 INJECTION INTRAVENOUS at 13:41

## 2020-11-04 RX ADMIN — PROPOFOL 120 MG: 10 INJECTION, EMULSION INTRAVENOUS at 12:41

## 2020-11-04 RX ADMIN — METRONIDAZOLE 500 MG: 500 INJECTION, SOLUTION INTRAVENOUS at 12:37

## 2020-11-04 RX ADMIN — LIDOCAINE HYDROCHLORIDE 50 MG: 20 INJECTION, SOLUTION INTRAVENOUS at 12:41

## 2020-11-04 RX ADMIN — ROCURONIUM BROMIDE 50 MG: 10 INJECTION INTRAVENOUS at 12:41

## 2020-11-04 RX ADMIN — ROCURONIUM BROMIDE 20 MG: 10 INJECTION INTRAVENOUS at 13:20

## 2020-11-04 RX ADMIN — LIDOCAINE HYDROCHLORIDE AND EPINEPHRINE 3 ML: 15; 5 INJECTION, SOLUTION EPIDURAL at 11:17

## 2020-11-04 RX ADMIN — SUGAMMADEX 200 MG: 100 INJECTION, SOLUTION INTRAVENOUS at 14:10

## 2020-11-04 RX ADMIN — SODIUM CHLORIDE, POTASSIUM CHLORIDE, SODIUM LACTATE AND CALCIUM CHLORIDE: 600; 310; 30; 20 INJECTION, SOLUTION INTRAVENOUS at 16:07

## 2020-11-04 RX ADMIN — ONDANSETRON 4 MG: 2 INJECTION INTRAMUSCULAR; INTRAVENOUS at 13:00

## 2020-11-04 RX ADMIN — TAMSULOSIN HYDROCHLORIDE 0.4 MG: 0.4 CAPSULE ORAL at 17:25

## 2020-11-04 RX ADMIN — MIDAZOLAM HYDROCHLORIDE 2 MG: 2 INJECTION, SOLUTION INTRAMUSCULAR; INTRAVENOUS at 11:09

## 2020-11-04 ASSESSMENT — ENCOUNTER SYMPTOMS
BACK PAIN: 0
NAUSEA: 0
SORE THROAT: 0
ABDOMINAL PAIN: 0
WHEEZING: 0
SHORTNESS OF BREATH: 0
VOMITING: 0

## 2020-11-04 ASSESSMENT — PULMONARY FUNCTION TESTS
PIF_VALUE: 12
PIF_VALUE: 12
PIF_VALUE: 11
PIF_VALUE: 12
PIF_VALUE: 12
PIF_VALUE: 26
PIF_VALUE: 11
PIF_VALUE: 13
PIF_VALUE: 10
PIF_VALUE: 12
PIF_VALUE: 10
PIF_VALUE: 11
PIF_VALUE: 12
PIF_VALUE: 12
PIF_VALUE: 11
PIF_VALUE: 10
PIF_VALUE: 12
PIF_VALUE: 1
PIF_VALUE: 12
PIF_VALUE: 2
PIF_VALUE: 11
PIF_VALUE: 10
PIF_VALUE: 21
PIF_VALUE: 13
PIF_VALUE: 12
PIF_VALUE: 11
PIF_VALUE: 12
PIF_VALUE: 11
PIF_VALUE: 12
PIF_VALUE: 11
PIF_VALUE: 11
PIF_VALUE: 21
PIF_VALUE: 11
PIF_VALUE: 22
PIF_VALUE: 10
PIF_VALUE: 22
PIF_VALUE: 12
PIF_VALUE: 11
PIF_VALUE: 1
PIF_VALUE: 12
PIF_VALUE: 11
PIF_VALUE: 10
PIF_VALUE: 12
PIF_VALUE: 11
PIF_VALUE: 12
PIF_VALUE: 12
PIF_VALUE: 13
PIF_VALUE: 22
PIF_VALUE: 1
PIF_VALUE: 12
PIF_VALUE: 1
PIF_VALUE: 11
PIF_VALUE: 11
PIF_VALUE: 22
PIF_VALUE: 21
PIF_VALUE: 12
PIF_VALUE: 27
PIF_VALUE: 13
PIF_VALUE: 12
PIF_VALUE: 1
PIF_VALUE: 12
PIF_VALUE: 12
PIF_VALUE: 1
PIF_VALUE: 1
PIF_VALUE: 12
PIF_VALUE: 12
PIF_VALUE: 22
PIF_VALUE: 12
PIF_VALUE: 11
PIF_VALUE: 21
PIF_VALUE: 11
PIF_VALUE: 12
PIF_VALUE: 2
PIF_VALUE: 12
PIF_VALUE: 6
PIF_VALUE: 11
PIF_VALUE: 9
PIF_VALUE: 11

## 2020-11-04 NOTE — ANESTHESIA PROCEDURE NOTES
Continuous Thoracic T12 Epidural     Patient location during procedure: pre-op  Start time: 11/4/2020 11:09 AM  End time: 11/4/2020 11:20 AM  Reason for block: post-op pain management  Staffing  Anesthesiologist: Brian Esposito DO  Performed: anesthesiologist   Preanesthetic Checklist  Completed: patient identified, site marked, surgical consent, pre-op evaluation, timeout performed, IV checked, risks and benefits discussed, monitors and equipment checked, anesthesia consent given, oxygen available and patient being monitored  Epidural  Patient position: sitting  Prep: Betadine  Patient monitoring: cardiac monitor, continuous pulse ox and frequent blood pressure checks  Approach: midline  Location: thoracic (1-12) (T12)  Injection technique: MARTINA air  Provider prep: mask and sterile gloves  Needle  Needle type: Tuohy   Needle gauge: 17 G  Needle length: 3.5 in  Needle insertion depth: 4 cm  Catheter type: end hole  Catheter size: 18 G  Catheter at skin depth: 9 cm  Test dose: negative  Assessment  Hemodynamics: stable  Attempts: 1

## 2020-11-04 NOTE — PROGRESS NOTES
1525-The patient's b/p started to gtt and Dr. Jaun Lopez called he is at bedside given some nadege by anesthesia and running fluid wide open. Dr. Jaun Lopez here setting up epidural gtt.

## 2020-11-04 NOTE — PROGRESS NOTES
1550-The epidural pump beeping that the battery is low Dr. Socrates Morris called he will be over to replace pump.

## 2020-11-04 NOTE — ANESTHESIA PRE PROCEDURE
Department of Anesthesiology  Preprocedure Note       Name:  Chauncey Garrett   Age:  80 y.o.  :  1934                                          MRN:  [id number]         Date:  2020      Surgeon: Dionte Paris):  Evert Oconnor MD    Procedure: Procedure(s):  LAPAROSCOPIC RIGHT COLECTOMY EPIDURAL    Medications prior to admission:   Prior to Admission medications    Medication Sig Start Date End Date Taking?  Authorizing Provider   omeprazole (PRILOSEC) 40 MG delayed release capsule Take 1 capsule by mouth every morning (before breakfast) 20  Yes MOSHE Chang CNP   carvedilol (COREG) 3.125 MG tablet Take 1 tablet by mouth 2 times daily 20  Yes Trey Cordero MD   tamsulosin (FLOMAX) 0.4 MG capsule Take 0.4 mg by mouth daily   Yes Historical Provider, MD   simvastatin (ZOCOR) 20 MG tablet Take 1 tablet by mouth nightly 3/21/19  Yes MOSHE Paige CNP   ondansetron (ZOFRAN) 4 MG tablet TAKE 1 TO 2 TABLETS EVERY 4 TO 6 HOURS AS NEEDED FOR NAUSEA AND VOMITING 19  Yes Omkar Quintana MD   latanoprost (XALATAN) 0.005 % ophthalmic solution Place 1 drop into both eyes nightly   Yes Historical Provider, MD   midodrine (PROAMATINE) 2.5 MG tablet Take 1 tablet by mouth 3 times daily 20   FREDY Tony   apixaban (ELIQUIS) 2.5 MG TABS tablet Take 2.5 mg by mouth 2 times daily    Historical Provider, MD   Multiple Vitamins-Minerals (EYE VITAMINS) CAPS Take 1 capsule by mouth daily 19   Historical Provider, MD       Current medications:    Current Facility-Administered Medications   Medication Dose Route Frequency Provider Last Rate Last Dose    lactated ringers infusion   Intravenous Continuous MOSHE Curtis  mL/hr at 20 1016      lidocaine PF 1 % injection 1 mL  1 mL Intradermal Once PRN MOSHE Curtis CNP        sodium chloride flush 0.9 % injection 10 mL  10 mL Intravenous 2 times per day MOSHE Fisher CNP        sodium chloride flush 0.9 % injection 10 mL  10 mL Intravenous PRN MOSHE Fisher CNP        ciprofloxacin (CIPRO) IVPB 400 mg  400 mg Intravenous On Call to Evelina Meléndez MD        metronidazole (FLAGYL) 500 mg in NaCl 100 mL IVPB premix  500 mg Intravenous On Call to Evelina Meléndez MD           Allergies:     Allergies   Allergen Reactions    Morphine      Other reaction(s): Delirium       Problem List:    Patient Active Problem List   Diagnosis Code    Hypertension I10    Benign prostatic hyperplasia N40.0    GERD (gastroesophageal reflux disease) K21.9    Osteoarthritis M19.90    Type 2 diabetes mellitus without complication (HCC) M57.8    Hyperlipidemia E78.5    Bladder cancer (HCC) C67.9    Sinus bradycardia on ECG R00.1    Left bundle branch block I44.7    Abnormal finding on EKG R94.31    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    Hypogonadism in male E29.1    Atrial fibrillation (HCC) I48.91    Abdominal pain R10.9    Mesenteric ischemia (HCC) K55.9    Epigastric abdominal pain R10.13    Nausea R11.0    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 K29.40    Urothelial carcinoma (HCC) C68.9    Chronic kidney disease, stage III (moderate) N18.30    Congestive heart failure (HCC) I50.9       Past Medical History:        Diagnosis Date    Abnormal finding on EKG 6/13/2016    Atrial [address] ([facility] [address] 1/22/2018    BPH (benign prostatic hypertrophy)     Cancer (HCC)     kidney - removed Left kidney    Colon polyps     Congestive heart failure (HCC) 1/24/2019    GERD (gastroesophageal reflux disease)     Hyperlipidemia     Hypertension     Left bundle branch block 6/13/2016    Osteoarthritis     Sinus bradycardia on ECG 6/13/2016       Past Surgical History:        Procedure Laterality Date    ANGIOPLASTY  05/08/2018    percutaneous transluminal angioplasty of the SMA with stent implantation, [facility]    CARDIAC DEFIBRILLATOR PLACEMENT      COLONOSCOPY  2016    COLONOSCOPY N/A 10/20/2020    [facility]     [id number] PACEMAKER [facility]ION      [place] ESOPHAGOGASTRODUODENOSCOPY TRANSORAL DIAGNOSTIC N/A 6/5/2018    EGD ESOPHAGOGASTRODUODENOSCOPY performed by [facility] Patel MD at [facility]  08/12/2016    left kidney, Dr. Levar Diallo N/A 10/20/2020    EGD ESOPHAGOGASTRODUODENOSCOPY performed by Eve Patel MD at [facility]  01/2019       Social History:    Social History     Tobacco Use    Smoking status: Never Smoker    Smokeless tobacco: Never Used   Substance Use Topics    Alcohol use: Yes     Comment: Rarely beer                                Counseling given: Not Answered      Vital Signs (Current):   Vitals:    11/04/20 0956   BP: 137/78   Pulse: 69   Resp: 16   Temp: 97.2 °F (36.2 °C)   TempSrc: Temporal   SpO2: 100%   Weight: 160 lb (72.6 kg)   Height: 5' 9\" (1.753 m)                                              BP Readings from Last 3 Encounters:   11/04/20 137/78   10/29/20 (!) 142/81   10/20/20 132/66       NPO Status: Time of last liquid consumption: 2200                        Time of last solid consumption: 2200                        Date of last liquid consumption: 11/03/20                        Date of last solid food consumption: 11/02/20    BMI:   Wt Readings from Last 3 Encounters:   11/04/20 160 lb (72.6 kg)   10/29/20 160 lb 6.4 oz (72.8 kg)   10/27/20 160 lb (72.6 kg)     Body mass index is 23.63 kg/m².     CBC:   Lab Results   Component Value Date    WBC 4.5 10/29/2020    RBC 3.52 10/29/2020    RBC 2.89 03/08/2019    HGB 10.9 10/29/2020    HCT 32.1 10/29/2020    MCV 91.4 10/29/2020    RDW 13.9 10/29/2020     10/29/2020       CMP:   Lab Results   Component Value Date    NA 140 10/29/2020    K 4.4 10/29/2020    K 4.3 01/25/2018     10/29/2020    CO2 24 10/29/2020    BUN 27 10/29/2020    CREATININE 1.37 10/29/2020    GFRAA 59.6 10/29/2020    AGRATIO 1.3 03/05/2019    LABGLOM 49.2 10/29/2020    GLUCOSE 85 10/29/2020    GLUCOSE 109 02/28/2020    PROT 5.4 03/05/2019    CALCIUM 9.0 10/29/2020    BILITOT 0.5 03/05/2019    ALKPHOS 58 03/05/2019    AST 15 03/05/2019    ALT 8 03/05/2019       POC Tests: No results for input(s): POCGLU, POCNA, POCK, POCCL, POCBUN, POCHEMO, POCHCT in the last 72 hours.     Coags:   Lab Results   Component Value Date    PROTIME 11.7 05/30/2019    PROTIME 12.1 01/17/2019    INR 1.0 05/30/2019    APTT 25.0 10/10/2017       HCG (If Applicable): No results found for: PREGTESTUR, PREGSERUM, HCG, HCGQUANT     ABGs: No results found for: PHART, PO2ART, CMR6WTY, NKE3VCX, BEART, T0CPXHTH     Type & Screen (If Applicable):  No results found for: LABABO, LABRH    Drug/Infectious Status (If Applicable):  No results found for: HIV, HEPCAB    COVID-19 Screening (If Applicable):   Lab Results   Component Value Date    COVID19 Not Detected 10/29/2020         Anesthesia Evaluation  Patient summary reviewed and Nursing notes reviewed no history of anesthetic complications:   Airway: Mallampati: II  TM distance: >3 FB   Neck ROM: full  Mouth opening: > = 3 FB Dental: normal exam         Pulmonary:Negative Pulmonary ROS and normal exam  breath sounds clear to auscultation                             Cardiovascular:Negative CV ROS  Exercise tolerance: good (>4 METS),   (+) hypertension:, pacemaker: AICD and pacemaker, dysrhythmias: atrial fibrillation, CHF:,       ECG reviewed  Rhythm: regular  Rate: normal           Beta Blocker:  Dose within 24 Hrs      ROS comment: LBBB     Neuro/Psych:   Negative Neuro/Psych ROS              GI/Hepatic/Renal:   (+) GERD:,           Endo/Other:    (+) DiabetesType II DM, , blood dyscrasia: anticoagulation therapy and anemia:., malignancy/cancer. Pt had PAT visit. Abdominal:           Vascular: negative vascular ROS. Anesthesia Plan      general and epidural     ASA 4     (ETT)  Induction: intravenous. MIPS: Postoperative opioids intended and Prophylactic antiemetics administered. Anesthetic plan and risks discussed with patient. Plan discussed with CRNA.     Attending anesthesiologist reviewed and agrees with Jorge A Kowalski DO   11/4/2020

## 2020-11-04 NOTE — BRIEF OP NOTE
Department of General Surgery - Adult  Surgical Service colorectal surgery  Brief Operative Report      Pre-operative Diagnosis: Cecal carcinoma    Post-operative Diagnosis: Same    Procedure: Laparoscopic right colectomy with primary anastomosis extracorporeal    Surgeon: Stacie Rucker     Assistant(s):  Jaren    Anesthesia:  General endotrachial anesthesia and Epidural anesthesia    Estimated blood loss: 100    Total Urine Output: 200     Drains: None    Specimens: Right colon    Implants: None    Findings: Cecal carcinoma with tattoo    Complications: None    Condition:  stable    See dictated operative report for full details.

## 2020-11-04 NOTE — ANESTHESIA POSTPROCEDURE EVALUATION
Department of Anesthesiology  Postprocedure Note    Patient: Gina Mcdaniel  MRN: [id number]  YOB: 1934  Date of evaluation: 11/4/2020  Time:  2:17 PM     Procedure Summary     Date:  11/04/20 Room / Location:  [address]    Anesthesia Start:  2421 Anesthesia Stop:      Procedure:  LAPAROSCOPIC RIGHT COLECTOMY (N/A Abdomen) Diagnosis:  (CECAL MASS)    Surgeon:  Atif Levy MD Responsible Provider:  MOSHE Brooks CRNA    Anesthesia Type:  general, epidural ASA Status:  4          Anesthesia Type: general, epidural    Shamika Phase I:      Shamika Phase II:      Last vitals: Reviewed and per EMR flowsheets. Anesthesia Post Evaluation    Patient location during evaluation: PACU  Patient participation: complete - patient participated  Level of consciousness: awake and alert  Pain score: 2  Airway patency: patent  Nausea & Vomiting: no nausea  Complications: no  Cardiovascular status: hemodynamically stable  Respiratory status: acceptable  Hydration status: stable  Comments: 10/05/2020  Excellent analgesia with continuous epidural infusion. Maintained.

## 2020-11-04 NOTE — PROGRESS NOTES
Pt here from post op. Pt is alert and oriented. Dressing to abdomen is dry and intact. VS stable. Epidural in place.  Electronically signed by Tunde Acosta RN on 11/4/2020 at 5:38 PM

## 2020-11-05 LAB
ANION GAP SERPL CALCULATED.3IONS-SCNC: 11 MEQ/L (ref 9–15)
BUN BLDV-MCNC: 26 MG/DL (ref 8–23)
CALCIUM SERPL-MCNC: 8.4 MG/DL (ref 8.5–9.9)
CHLORIDE BLD-SCNC: 104 MEQ/L (ref 95–107)
CO2: 24 MEQ/L (ref 20–31)
CREAT SERPL-MCNC: 1.63 MG/DL (ref 0.7–1.2)
GFR AFRICAN AMERICAN: 48.7
GFR NON-AFRICAN AMERICAN: 40.3
GLUCOSE BLD-MCNC: 117 MG/DL (ref 70–99)
GLUCOSE BLD-MCNC: 131 MG/DL (ref 60–115)
HCT VFR BLD CALC: 25.5 % (ref 42–52)
HEMOGLOBIN: 8.6 G/DL (ref 14–18)
MCH RBC QN AUTO: 31.1 PG (ref 27–31.3)
MCHC RBC AUTO-ENTMCNC: 33.9 % (ref 33–37)
MCV RBC AUTO: 91.6 FL (ref 80–100)
PDW BLD-RTO: 14.1 % (ref 11.5–14.5)
PERFORMED ON: ABNORMAL
PLATELET # BLD: 164 K/UL (ref 130–400)
POTASSIUM SERPL-SCNC: 4.1 MEQ/L (ref 3.4–4.9)
RBC # BLD: 2.78 M/UL (ref 4.7–6.1)
SODIUM BLD-SCNC: 139 MEQ/L (ref 135–144)
WBC # BLD: 9.1 K/UL (ref 4.8–10.8)

## 2020-11-05 PROCEDURE — 1210000000 HC MED SURG R&B

## 2020-11-05 PROCEDURE — 85027 COMPLETE CBC AUTOMATED: CPT

## 2020-11-05 PROCEDURE — 2580000003 HC RX 258: Performed by: ANESTHESIOLOGY

## 2020-11-05 PROCEDURE — 99024 POSTOP FOLLOW-UP VISIT: CPT | Performed by: COLON & RECTAL SURGERY

## 2020-11-05 PROCEDURE — 6370000000 HC RX 637 (ALT 250 FOR IP): Performed by: COLON & RECTAL SURGERY

## 2020-11-05 PROCEDURE — 36415 COLL VENOUS BLD VENIPUNCTURE: CPT

## 2020-11-05 PROCEDURE — 2500000003 HC RX 250 WO HCPCS: Performed by: ANESTHESIOLOGY

## 2020-11-05 PROCEDURE — 6370000000 HC RX 637 (ALT 250 FOR IP): Performed by: NURSE PRACTITIONER

## 2020-11-05 PROCEDURE — 80048 BASIC METABOLIC PNL TOTAL CA: CPT

## 2020-11-05 PROCEDURE — 6360000002 HC RX W HCPCS: Performed by: ANESTHESIOLOGY

## 2020-11-05 PROCEDURE — 6360000002 HC RX W HCPCS: Performed by: COLON & RECTAL SURGERY

## 2020-11-05 PROCEDURE — 2580000003 HC RX 258: Performed by: COLON & RECTAL SURGERY

## 2020-11-05 RX ORDER — ATORVASTATIN CALCIUM 20 MG/1
20 TABLET, FILM COATED ORAL DAILY
Status: DISCONTINUED | OUTPATIENT
Start: 2020-11-05 | End: 2020-11-10 | Stop reason: HOSPADM

## 2020-11-05 RX ORDER — ACETAMINOPHEN 325 MG/1
650 TABLET ORAL EVERY 6 HOURS PRN
Status: DISCONTINUED | OUTPATIENT
Start: 2020-11-05 | End: 2020-11-10 | Stop reason: HOSPADM

## 2020-11-05 RX ORDER — PANTOPRAZOLE SODIUM 40 MG/1
40 TABLET, DELAYED RELEASE ORAL
Status: DISCONTINUED | OUTPATIENT
Start: 2020-11-05 | End: 2020-11-10 | Stop reason: HOSPADM

## 2020-11-05 RX ADMIN — SODIUM CHLORIDE: 9 INJECTION, SOLUTION INTRAVENOUS at 05:27

## 2020-11-05 RX ADMIN — ACETAMINOPHEN 650 MG: 325 TABLET, FILM COATED ORAL at 14:54

## 2020-11-05 RX ADMIN — Medication 10 ML: at 09:43

## 2020-11-05 RX ADMIN — PANTOPRAZOLE SODIUM 40 MG: 40 TABLET, DELAYED RELEASE ORAL at 05:28

## 2020-11-05 RX ADMIN — BUPIVACAINE HYDROCHLORIDE: 5 INJECTION, SOLUTION EPIDURAL; INTRACAUDAL at 10:44

## 2020-11-05 RX ADMIN — BUPIVACAINE HYDROCHLORIDE: 5 INJECTION, SOLUTION EPIDURAL; INTRACAUDAL at 19:27

## 2020-11-05 RX ADMIN — SODIUM CHLORIDE: 9 INJECTION, SOLUTION INTRAVENOUS at 19:33

## 2020-11-05 RX ADMIN — TAMSULOSIN HYDROCHLORIDE 0.4 MG: 0.4 CAPSULE ORAL at 09:43

## 2020-11-05 RX ADMIN — BUPIVACAINE HYDROCHLORIDE: 5 INJECTION, SOLUTION EPIDURAL; INTRACAUDAL at 02:07

## 2020-11-05 RX ADMIN — ATORVASTATIN CALCIUM 20 MG: 20 TABLET, FILM COATED ORAL at 19:41

## 2020-11-05 RX ADMIN — ENOXAPARIN SODIUM 40 MG: 40 INJECTION SUBCUTANEOUS at 10:43

## 2020-11-05 ASSESSMENT — ENCOUNTER SYMPTOMS
NAUSEA: 0
COUGH: 0
DIARRHEA: 0
SHORTNESS OF BREATH: 0
VOMITING: 0

## 2020-11-05 ASSESSMENT — PAIN SCALES - GENERAL
PAINLEVEL_OUTOF10: 8
PAINLEVEL_OUTOF10: 3
PAINLEVEL_OUTOF10: 0

## 2020-11-05 NOTE — OP NOTE
the cecum. The remainder of the three 5 mm ports were  placed. Laparoscopically, the cecum was freed up from the surrounding right  lower quadrant peritoneal attachments and the line of Toldt was taken  around the hepatic flexure to allow for complete mobilization of the  right colon. At this time, the ports were removed. Pneumoperitoneum was released and  a small incision was made around the umbilicus. Subcutaneous tissues  were incised down through the fascia. The cecum was grasped and brought out the incision and the right colon  was exteriorized. The omentum was taken off the proximal transverse  colon. The mid transverse colon was transected using a 75-mm LILIAN  stapler. The distal ileum was done similarly. The mesentery was clamped, cut, and ligated using a LigaSure device in a  high fashion. The right branch of the middle colic as well as the  ileocolic vessels were doubly ligated in a high fashion using 0 Vicryl  suture. The specimen was removed and sent off down the back table where  the tumor was identified. A side-to-side ileocolostomy anastomosis was performed using a 75-mm LILIAN  stapler followed by 60-mm TA stapler. The mesenteric defect was closed  using a 3-0 running Vicryl suture. The anastomotic crotch was  reinforced using 3-0 silk sutures in a Lembert fashion. The small bowel was run proximally to assure that there was no twisting  of the mesentery. The remainder of the small bowel was normal and  placed back in anatomic position. The liver was palpated and normal.  The omentum was draped back over the  intestine. Excellent hemostasis was assured. The lap, needle, and  instrument counts were all correct prior to closure. The fascia was closed with a combination of 0 Vicryl and 0 Prolene  sutures. Subcutaneous tissues were irrigated. The skin was closed with  staples. Dressings were applied.     Following closure, the lap, needle, and instrument counts were again

## 2020-11-05 NOTE — PROGRESS NOTES
98.8 °F (37.1 °C)  BP Range (65S): Systolic (95EFH), AGZ:746 , Min:54 , RNM:188     Diastolic (18VQO), GVM:73, Min:33, Max:97    Pulse Range (24h): Pulse  Av.2  Min: 50  Max: 78  Respiration Range (24h): Resp  Av.9  Min: 0  Max: 16    GENERAL: alert, no distress  LUNGS: clear to ausculation, without wheezes, rales or rhonci  HEART: normal rate and regular rhythm  ABDOMEN: soft, non-tender and no guarding or peritoneal signs  EXTERMITY: no cyanosis, clubbing or edema  In: 875 [I.V.:875]  Out: 850 [Urine:850]  Date 20 0000 - 20 2359   Shift 9956-2212 2337-1147 3816-8370 24 Hour Total   INTAKE   I.V.(mL/kg) 743(10.2)   743(10.2)   Shift Total(mL/kg) 743(10.2)   743(10.2)   OUTPUT   Urine(mL/kg/hr) 350(0.6)   350   Emesis/NG output(mL/kg) 0(0)   0(0)   Other(mL/kg) 0(0)   0(0)   Stool(mL/kg) 0(0)   0(0)   Blood(mL/kg) 0(0)   0(0)   Shift Total(mL/kg) 350(4.8)   350(4.8)   Weight (kg) 72.6 72.6 72.6 72.6       LABS  Recent Labs     20  0623   WBC 9.1   HGB 8.6*   HCT 25.5*         K 4.1      CO2 24   BUN 26*   CREATININE 1.63*   CALCIUM 8.4*      No results for input(s): PTT, INR in the last 72 hours. Invalid input(s): PT  No results for input(s): AST, ALT, BILITOT, BILIDIR, AMYLASE, LIPASE, LDH, LACTA in the last 72 hours. RADIOLOGY  Ct Abdomen Pelvis Wo Contrast Additional Contrast? Radiologist Recommendation    Result Date: 10/19/2020  EXAM: CT ABDOMEN PELVIS WO CONTRAST CLINICAL:R10.30 Lower abdominal pain ICD10 TECHNIQUE: CT scans of the abdomen and pelvis were obtained without contrast as ordered All CT scans at this facility use dose modulation, iterative reconstruction, and/or weight based dosing when appropriate to reduce radiation dose to as low as reasonably achievable. FINDINGS: Some nonspecific reticular markings at the lung bases noted. Right-sided heart electrodes in place. .       No evidence of nonenhancing pathology in the liver or spleen.        No suspicious lesions in the pancreas on this unenhanced study. Gallbladder and biliary tree unremarkable. There is gas and undigested debris within the stomach and there is gas in the small hiatal hernia. No inflammation in the epigastric soft tissues. Indwelling ureteral stent on the right side extends from the midpole the right kidney to the urinary bladder in satisfactory position. No hydronephrosis in the right kidney or evidence of renal stone disease. Left kidney is surgically absent. Atherosclerotic changes in the aorta noted but no evidence of aneurysm. There is stent material in the SMA proximally. There is calcification in the renal arteries bilaterally. No retroperitoneal mass or fluid collection. A few fluid filled small bowel loops in the left abdomen with no bowel wall edema or small bowel dilatation. No evidence of small bowel ischemia. The appendix is normal. There is gas and stool throughout the colon. There are diverticula in the colon with the majority located in the redundant sigmoid colon. No evidence of acute diverticulitis. No evidence of colonic obstruction. Anterior abdominal wall intact. The pelvic viscera are intact. No mass or inflammatory changes seen in the pelvis. No significant adenopathy along the pelvic sidewall. Bony structures intact. Degenerative changes overlie the lower dorsal spine and are fairly severe the lower lumbar spine at L5-S1. No compression fracture or infiltrative bony process. Mild degenerative hip disease. IMPRESSION: NO ACUTE PROCESS IN THE ABDOMEN OR PELVIS.      Electronically signed by Farooq Pinto MD on 11/5/2020 at 9:56 AM

## 2020-11-05 NOTE — PROGRESS NOTES
Progress Note  Date:2020       Room:W473/W473-01  Patient Name:Luis Lynch     YOB: 1934     Age:86 y.o. Subjective    Subjective:  Symptoms:  He reports weakness. No shortness of breath, malaise, cough, chest pain, headache, chest pressure, anorexia, diarrhea or anxiety. Diet:  No nausea or vomiting. Review of Systems   Respiratory: Negative for cough and shortness of breath. Cardiovascular: Negative for chest pain. Gastrointestinal: Negative for anorexia, diarrhea, nausea and vomiting. Neurological: Positive for weakness. Objective         Vitals Last 24 Hours:  TEMPERATURE:  Temp  Av.5 °F (35.3 °C)  Min: 95 °F (35 °C)  Max: 98.8 °F (37.1 °C)  RESPIRATIONS RANGE: Resp  Av.9  Min: 0  Max: 16  PULSE OXIMETRY RANGE: SpO2  Av.2 %  Min: 90 %  Max: 100 %  PULSE RANGE: Pulse  Av.2  Min: 50  Max: 78  BLOOD PRESSURE RANGE: Systolic (37XLG), ALN:490 , Min:54 , PWY:393   ; Diastolic (94QQK), ZHO:63, Min:33, Max:97    I/O (24Hr): Intake/Output Summary (Last 24 hours) at 2020 1054  Last data filed at 2020 0529  Gross per 24 hour   Intake 1875 ml   Output 850 ml   Net 1025 ml     Objective:  General Appearance:  Comfortable, well-appearing and in no acute distress. Vital signs: (most recent): Blood pressure 102/65, pulse 75, temperature 98.8 °F (37.1 °C), temperature source Oral, resp. rate 16, height 5' 9\" (1.753 m), weight 160 lb (72.6 kg), SpO2 95 %. HEENT: Normal HEENT exam.    Lungs:  Normal effort and normal respiratory rate. Breath sounds clear to auscultation. Heart: Normal rate. Abdomen: Abdomen is distended. Bowel sounds are normal.   There is no epigastric area or suprapubic area tenderness. Extremities: There is no deformity. Pulses: Distal pulses are intact. Neurological: Patient is alert. Pupils:  Pupils are equal, round, and reactive to light. Skin:  Warm and dry.       Labs/Imaging/Diagnostics

## 2020-11-05 NOTE — PROGRESS NOTES
Pt in bed resting. Assessment documented. No needs at this time. A&O, although needs reminders about staying in bed and epidural in place. VSS, afebrile. Flatus noted and 2 BM at this time. Tolerating ice chips and popsicles well. SCDs on and operating. Dressings CDI. no needs at this time. Bal in place, clear yellow urine noted. Bed alarm on, fall prevention in place. Will continue to monitor. Electronically signed by Santosh Ortega RN on 11/4/2020 at 11:41 PM

## 2020-11-05 NOTE — PROGRESS NOTES
Bed alarm went off D/t pt legs being out of bed. He states that his finger is stuck on something in his abdomen. Upon assessment the bandage from his procedure was removed and one staples it out, the other is stuck in his finger and incision. The staple was removed from the finger. The incision was cleaned with saline and steri strips placed to secure incision closed. Binder placed to prevent pt from opening other incisions. MD made aware via secure message. Pt repositioned and reoriented. Bed alarm on. Will continue to monitor. Electronically signed by Yessy Araujo RN on 11/5/2020 at 4:08 AM

## 2020-11-05 NOTE — PROGRESS NOTES
HOSPITALIST CONSULT CALLED TO DR Otilio Antunez Electronically signed by Wendi Swanson on 11/4/2020 at 7:30 PM

## 2020-11-05 NOTE — CONSULTS
Consult Note    Reason for Consult:   Medical management of chronic health issues. Requesting Physician:  Cruz Chambers MD    HISTORY OF PRESENT ILLNESS:    The patient is a 80 y.o. male w history cardiomyopathy, Afib, BPH, left kidney cancer s/p nephrectomy,  CHF, GERD, HLD, HTN, OA, glaucoma and colon cancer s/p colectomy earlier today. He has diagnosis of colon cancer and is s/p Laparoscopic right colectomy with primary anastomosis    Incisions covered, no bleeding. No pain - has epidural in place. His issues include Afib for which he takes xarelto   He is maintaining NSR. On betablocker. Cardiomyopathy    Last EF around 20%  No evidence of failure,  No edema  Lungs clear. Has ICD  On carvedilol   No diuretic    Had been treated for DM2 in past - now told doesn't have  He is on no meds. HLD, GERD, BPH  Controlled on meds. We are consulted for mangement of chronic medical issues.                  Past Medical History:   Diagnosis Date    Abnormal finding on EKG 6/13/2016    Atrial fibrillation (HCC) 1/22/2018    BPH (benign prostatic hypertrophy)     Cancer (HCC)     kidney - removed Left kidney    Colon polyps     Congestive heart failure ([facility] [address] 1/24/2019    GERD (gastroesophageal reflux disease)     Hyperlipidemia     Hypertension     Left bundle branch block 6/13/2016    Osteoarthritis     Sinus bradycardia on ECG 6/13/2016       Past Surgical History:   Procedure Laterality Date    ANGIOPLASTY  05/08/2018    percutaneous transluminal angioplasty of the SMA with stent implantation, [place] [address] COLONOSCOPY  2016    COLONOSCOPY N/A 10/20/2020    COLONOSCOPY DIAGNOSTIC performed by David Arechiga MD at [place] [address]  01/25/2019     [address] [facility]      IA ESOPHAGOGASTRODUODENOSCOPY TRANSORAL DIAGNOSTIC N/A 6/5/2018    EGD ESOPHAGOGASTRODUODENOSCOPY [place] [facility] [place] [address] [place]OTAL NEPHRECTOMY  08/12/2016    left kidney, Dr. Aaron Palomino N/A 10/20/2020    EGD ESOPHAGOGASTRODUODENOSCOPY performed by Raúl Robertson MD at [place] [address]  01/2019       Prior to Admission medications    Medication Sig Start Date End Date Taking?  Authorizing Provider   omeprazole (PRILOSEC) 40 MG delayed release capsule Take 1 capsule by mouth every morning (before breakfast) 9/9/20  Yes Basia Pickering APRN - CNP   carvedilol (COREG) 3.125 MG tablet Take 1 tablet by mouth 2 times daily 7/29/20  Yes Paulina Estrada MD   tamsulosin (FLOMAX) 0.4 MG capsule Take 0.4 mg by mouth daily   Yes Historical Provider, MD   simvastatin (ZOCOR) 20 MG tablet Take 1 tablet by mouth nightly 3/21/19  Yes Ayden Stafford APRN - CNP   ondansetron (ZOFRAN) 4 MG tablet TAKE 1 TO 2 TABLETS EVERY 4 TO 6 HOURS AS NEEDED FOR NAUSEA AND VOMITING 1/11/19  Yes Krista Huynh MD   latanoprost (XALATAN) 0.005 % ophthalmic solution Place 1 drop into both eyes nightly   Yes Historical Provider, MD   midodrine (PROAMATINE) 2.5 MG tablet Take 1 tablet by mouth 3 times daily 7/21/20   FREDY Shine   apixaban (ELIQUIS) 2.5 MG TABS tablet Take 2.5 mg by mouth 2 times daily    Historical Provider, MD   Multiple Vitamins-Minerals (EYE VITAMINS) CAPS Take 1 capsule by mouth daily 1/22/19   Historical Provider, MD       Scheduled Meds:   metroNIDAZOLE  500 mg Intravenous On Call to OR    tamsulosin  0.4 mg Oral Daily    sodium chloride flush  10 mL Intravenous 2 times per day    [START ON 11/5/2020] enoxaparin  40 mg Subcutaneous Daily     Continuous Infusions:   sodium chloride 75 mL/hr at 11/04/20 1721    fentanyl epidural builder       PRN Meds:fentanNYL, diphenhydrAMINE, ondansetron, metoclopramide, meperidine, sodium chloride flush, promethazine **OR** ondansetron, naloxone, diphenhydrAMINE, diphenhydrAMINE, nalbuphine, ondansetron    Allergies   Allergen Reactions    Morphine      Other reaction(s): Delirium       Social History     Socioeconomic History    Marital status:      Spouse name: Not on file    Number of children: Not on file    Years of education: Not on file    Highest education level: Not on file   Occupational History    Not on file   Social Needs    Financial resource strain: Not on file    Food insecurity     Worry: Not on file     Inability: Not on file    Transportation needs     Medical: Not on file     Non-medical: Not on file   Tobacco Use    Smoking status: Never Smoker    Smokeless tobacco: Never Used   Substance and Sexual Activity    Alcohol use: Yes     Comment: Rarely beer    Drug use: No    Sexual activity: Not on file   Lifestyle    Physical activity     Days per week: Not on file     Minutes per session: Not on file    Stress: Not on file   Relationships    Social connections     Talks on phone: Not on file     Gets together: Not on file     Attends Yazdanism service: Not on file     Active member of club or organization: Not on file     Attends meetings of clubs or organizations: Not on file     Relationship status: Not on file    Intimate partner violence     Fear of current or ex partner: Not on file     Emotionally abused: Not on file     Physically abused: Not on file     Forced sexual activity: Not on file   Other Topics Concern    Not on file   Social History Narrative    Not on file       Family History   Problem Relation Age of Onset    Heart Attack Father     Heart Attack Brother        Review Of Systems: Review of Systems   Constitutional: Positive for fatigue. Negative for chills, diaphoresis and fever. HENT: Negative for sore throat. Eyes: Negative for visual disturbance. Respiratory: Negative for shortness of breath and wheezing. Cardiovascular: Negative for chest pain, palpitations and leg swelling.    Gastrointestinal: Negative for abdominal pain, nausea and vomiting. Genitourinary: Negative for hematuria. Musculoskeletal: Negative for arthralgias and back pain. Skin: Negative for rash. Neurological: Positive for weakness. Negative for seizures and light-headedness. Psychiatric/Behavioral: Negative for confusion. The patient is not nervous/anxious. Physical Exam:  Vitals:    11/04/20 1635 11/04/20 1705 11/04/20 1725 11/04/20 1918   BP: 101/60 98/66 (!) 96/56 (!) 107/48   Pulse: 60 59 50 73   Resp: 13 16  16   Temp: 96.9 °F (36.1 °C) 97.9 °F (36.6 °C)  98.2 °F (36.8 °C)   TempSrc: Temporal Oral     SpO2: 98% 97% 90% 100%   Weight:       Height:           Physical Exam  Constitutional:       Appearance: He is in no distress. He is not toxic-appearing. HENT:      Head: Normocephalic. Nose: Nose normal.      Mouth/Throat:      Mouth: Mucous membranes are dry. Eyes:      Pupils: Pupils are equal, round, and reactive to light. Neck:      Musculoskeletal: No muscular tenderness. Cardiovascular:      Rate and Rhythm: Normal rate and regular rhythm. Pulses: Normal pulses. Heart sounds: No murmur. Pulmonary:      Effort: Pulmonary effort is normal.      Breath sounds: Normal breath sounds. Abdominal:      Palpations: Abdomen is soft. Tenderness: There is no abdominal tenderness. Musculoskeletal:      Right lower leg: No edema. Left lower leg: No edema. Lymphadenopathy:      Cervical: No cervical adenopathy. Skin:  Incisions w DSD. No drainage     General: Skin is warm and dry. Capillary Refill: Capillary refill takes less than 2 seconds. Neurological:      Mental Status: He is alert and oriented to person, place, and time. Psychiatric:         Mood and Affect: Mood normal.         Behavior: Behavior normal.         Labs:  No results found for this or any previous visit (from the past 24 hour(s)). Assessment/Plan:  1.  Colon cancer   S/p  Laparoscopic right colectomy with primary anastomosis   Pain free w epidural   Dressing dry, no drainage. Plan management per surgical service  2. Cardiomyopathy  LVED around 20% on past echo  No edema  Lungs clear. Carvedilol at home  No CP. Normal heart sounds   Left chest ICD   Plan  Resume carvedilol as pressure permits. Home dose 3.125 bid. 3. Gerd  No symptoms on Prilosec  Abdominal exam unremarkable. Plan resume in AM.   4. Glaucoma  Xalatan drops nightly to both eyes  No vision complaints. No swelling or erythema. Plan continue drops. 5. Hyperlipidemia  Stable on statin. Plan continie  6. BPH  Flomax daily. Clear urine, good volume. No dysuria. Plan continue flomax  7. Afib  remains in NSR   Normal rate 70s. Does take betablocker,  On eliquis currently on hold d/t surgery. Plan  Resume eliquis when ok w surgical service. Total time spent w patient for history and exam,  Chart review. Answering all questions. =  60 minutes.      Electronically signed by MOSHE Piedra CNP on 11/4/20 at 11:12 PM EST

## 2020-11-06 LAB
ANION GAP SERPL CALCULATED.3IONS-SCNC: 12 MEQ/L (ref 9–15)
BASOPHILS ABSOLUTE: 0 K/UL (ref 0–0.2)
BASOPHILS RELATIVE PERCENT: 0.3 %
BUN BLDV-MCNC: 26 MG/DL (ref 8–23)
CALCIUM SERPL-MCNC: 8 MG/DL (ref 8.5–9.9)
CHLORIDE BLD-SCNC: 104 MEQ/L (ref 95–107)
CO2: 20 MEQ/L (ref 20–31)
CREAT SERPL-MCNC: 1.83 MG/DL (ref 0.7–1.2)
EKG ATRIAL RATE: 104 BPM
EKG ATRIAL RATE: 98 BPM
EKG ATRIAL RATE: 99 BPM
EKG P AXIS: 58 DEGREES
EKG P AXIS: 63 DEGREES
EKG P AXIS: 74 DEGREES
EKG P-R INTERVAL: 152 MS
EKG P-R INTERVAL: 154 MS
EKG P-R INTERVAL: 154 MS
EKG Q-T INTERVAL: 384 MS
EKG Q-T INTERVAL: 400 MS
EKG Q-T INTERVAL: 402 MS
EKG QRS DURATION: 160 MS
EKG QRS DURATION: 160 MS
EKG QRS DURATION: 162 MS
EKG QTC CALCULATION (BAZETT): 504 MS
EKG QTC CALCULATION (BAZETT): 510 MS
EKG QTC CALCULATION (BAZETT): 515 MS
EKG R AXIS: 127 DEGREES
EKG R AXIS: 128 DEGREES
EKG R AXIS: 129 DEGREES
EKG T AXIS: -38 DEGREES
EKG T AXIS: -42 DEGREES
EKG T AXIS: -42 DEGREES
EKG VENTRICULAR RATE: 104 BPM
EKG VENTRICULAR RATE: 98 BPM
EKG VENTRICULAR RATE: 99 BPM
EOSINOPHILS ABSOLUTE: 0.1 K/UL (ref 0–0.7)
EOSINOPHILS RELATIVE PERCENT: 2.4 %
GFR AFRICAN AMERICAN: 42.6
GFR NON-AFRICAN AMERICAN: 35.2
GLUCOSE BLD-MCNC: 110 MG/DL (ref 70–99)
HCT VFR BLD CALC: 25.9 % (ref 42–52)
HEMOGLOBIN: 8.5 G/DL (ref 14–18)
LYMPHOCYTES ABSOLUTE: 0.5 K/UL (ref 1–4.8)
LYMPHOCYTES RELATIVE PERCENT: 8.4 %
MCH RBC QN AUTO: 30.5 PG (ref 27–31.3)
MCHC RBC AUTO-ENTMCNC: 32.8 % (ref 33–37)
MCV RBC AUTO: 93 FL (ref 80–100)
MONOCYTES ABSOLUTE: 0.5 K/UL (ref 0.2–0.8)
MONOCYTES RELATIVE PERCENT: 9 %
NEUTROPHILS ABSOLUTE: 4.5 K/UL (ref 1.4–6.5)
NEUTROPHILS RELATIVE PERCENT: 79.9 %
PDW BLD-RTO: 14.2 % (ref 11.5–14.5)
PLATELET # BLD: 144 K/UL (ref 130–400)
POTASSIUM SERPL-SCNC: 4 MEQ/L (ref 3.4–4.9)
RBC # BLD: 2.79 M/UL (ref 4.7–6.1)
SODIUM BLD-SCNC: 136 MEQ/L (ref 135–144)
WBC # BLD: 5.6 K/UL (ref 4.8–10.8)

## 2020-11-06 PROCEDURE — 6360000002 HC RX W HCPCS: Performed by: NURSE PRACTITIONER

## 2020-11-06 PROCEDURE — 2580000003 HC RX 258: Performed by: COLON & RECTAL SURGERY

## 2020-11-06 PROCEDURE — 2500000003 HC RX 250 WO HCPCS: Performed by: ANESTHESIOLOGY

## 2020-11-06 PROCEDURE — 2580000003 HC RX 258: Performed by: ANESTHESIOLOGY

## 2020-11-06 PROCEDURE — 85025 COMPLETE CBC W/AUTO DIFF WBC: CPT

## 2020-11-06 PROCEDURE — 6370000000 HC RX 637 (ALT 250 FOR IP): Performed by: NURSE PRACTITIONER

## 2020-11-06 PROCEDURE — 6360000002 HC RX W HCPCS: Performed by: ANESTHESIOLOGY

## 2020-11-06 PROCEDURE — 36415 COLL VENOUS BLD VENIPUNCTURE: CPT

## 2020-11-06 PROCEDURE — 80048 BASIC METABOLIC PNL TOTAL CA: CPT

## 2020-11-06 PROCEDURE — 1210000000 HC MED SURG R&B

## 2020-11-06 PROCEDURE — 6370000000 HC RX 637 (ALT 250 FOR IP): Performed by: COLON & RECTAL SURGERY

## 2020-11-06 PROCEDURE — 97166 OT EVAL MOD COMPLEX 45 MIN: CPT

## 2020-11-06 PROCEDURE — 99024 POSTOP FOLLOW-UP VISIT: CPT | Performed by: COLON & RECTAL SURGERY

## 2020-11-06 PROCEDURE — 97163 PT EVAL HIGH COMPLEX 45 MIN: CPT

## 2020-11-06 PROCEDURE — 93005 ELECTROCARDIOGRAM TRACING: CPT | Performed by: INTERNAL MEDICINE

## 2020-11-06 PROCEDURE — 6360000002 HC RX W HCPCS: Performed by: COLON & RECTAL SURGERY

## 2020-11-06 RX ORDER — FUROSEMIDE 10 MG/ML
20 INJECTION INTRAMUSCULAR; INTRAVENOUS ONCE
Status: COMPLETED | OUTPATIENT
Start: 2020-11-06 | End: 2020-11-06

## 2020-11-06 RX ADMIN — PANTOPRAZOLE SODIUM 40 MG: 40 TABLET, DELAYED RELEASE ORAL at 06:09

## 2020-11-06 RX ADMIN — ONDANSETRON 4 MG: 2 INJECTION INTRAMUSCULAR; INTRAVENOUS at 09:42

## 2020-11-06 RX ADMIN — Medication 10 ML: at 21:16

## 2020-11-06 RX ADMIN — ENOXAPARIN SODIUM 40 MG: 40 INJECTION SUBCUTANEOUS at 09:42

## 2020-11-06 RX ADMIN — BUPIVACAINE HYDROCHLORIDE: 5 INJECTION, SOLUTION EPIDURAL; INTRACAUDAL at 04:08

## 2020-11-06 RX ADMIN — SODIUM CHLORIDE: 9 INJECTION, SOLUTION INTRAVENOUS at 06:10

## 2020-11-06 RX ADMIN — ATORVASTATIN CALCIUM 20 MG: 20 TABLET, FILM COATED ORAL at 20:13

## 2020-11-06 RX ADMIN — BUPIVACAINE HYDROCHLORIDE: 5 INJECTION, SOLUTION EPIDURAL; INTRACAUDAL at 13:26

## 2020-11-06 RX ADMIN — LATANOPROST 1 DROP: 50 SOLUTION OPHTHALMIC at 01:12

## 2020-11-06 RX ADMIN — SODIUM CHLORIDE: 9 INJECTION, SOLUTION INTRAVENOUS at 20:13

## 2020-11-06 RX ADMIN — FUROSEMIDE 20 MG: 10 INJECTION, SOLUTION INTRAVENOUS at 21:16

## 2020-11-06 RX ADMIN — ACETAMINOPHEN 650 MG: 325 TABLET, FILM COATED ORAL at 20:13

## 2020-11-06 ASSESSMENT — ENCOUNTER SYMPTOMS
NAUSEA: 0
COUGH: 0
DIARRHEA: 0
SHORTNESS OF BREATH: 0
VOMITING: 0

## 2020-11-06 ASSESSMENT — PAIN SCALES - GENERAL
PAINLEVEL_OUTOF10: 0
PAINLEVEL_OUTOF10: 2
PAINLEVEL_OUTOF10: 1
PAINLEVEL_OUTOF10: 7

## 2020-11-06 NOTE — PROGRESS NOTES
Physical Therapy Med Surg Initial Assessment  Facility/Department[facility]  [facility]       NAME: Saturnino Wood  : 1934 (1 y.o.)  MRN: [id number]  CODE STATUS: Full Code    Date of Service: 2020    Patient Diagnosis(es): Colon cancer ([facility] [C18.9]   No chief complaint on file.     Patient Active Problem List    Diagnosis Date Noted    Colon cancer ([address] 2020    Glaucoma 2020    Cardiomyopathy ([address] 2020    Chronic kidney disease, stage III (moderate) 2019    Congestive heart failure ([address] 2019    Urothelial carcinoma ([facility] [address] 2019    Epigastric abdominal pain     Nausea     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     Mesenteric ischemia ([address] 02/15/2018    Abdominal pain     Atrial fibrillation ([address] 2018    Hypogonadism in male 2017    Sinus bradycardia on ECG 2016    Left bundle branch block 2016    Abnormal finding on EKG 2016    Primary bladder malignant neoplasm ([address] 2012    Bladder cancer ([address] 2012    Hypertension     Benign prostatic hyperplasia     GERD (gastroesophageal reflux disease)     Osteoarthritis     Type 2 diabetes mellitus without complication ([address]     Hyperlipidemia         Past Medical History:   Diagnosis Date    Abnormal finding on EKG 2016    Atrial fibrillation ([address] 2018    BPH (benign prostatic hypertrophy)     Cancer (HCC)     kidney - removed Left kidney    Colon polyps     Congestive heart failure ([address] 2019    GERD (gastroesophageal reflux disease)     Hyperlipidemia     Hypertension     Left bundle branch block 2016    Osteoarthritis     Sinus bradycardia on ECG 2016     Past Surgical History:   Procedure Laterality Date    ANGIOPLASTY  2018    percutaneous transluminal angioplasty of the SMA with stent implantation, [facility]ine Alfonso      COLONOSCOPY      person  Follows Commands: Impaired(follows about 50% of the time)    Observation/Palpation  Observation: pt on epidural, lethargic and varying alertness, occasional mumbling of words      Strength:  Strength RLE  Comment: grossly 4-/5  Strength LLE  Comment: grossly 4-/5    Neuro:  Balance  Sitting - Static: Poor        Sensation  Overall Sensation Status: WFL    Bed mobility  Supine to Sit: Moderate assistance;2 Person assistance  Sit to Supine: Maximum assistance;2 Person assistance  Comment: pt sat EOB briefly but unable to sustain postural control and losing balance in various directions, requires max A to return to supine, impaired sequencing of gross motor tasks    Transfers  Sit to Stand: Unable to assess(unsafe to progess due to poor cognition and alertness)       HR in 40s-50s throughout evaluation, BP WFL, 02 WFL. Activity Tolerance  Activity Tolerance: Patient limited by endurance; Patient limited by fatigue          PT Education  PT Education: Goals;PT Role;Plan of Care;General Safety;Orientation    ASSESSMENT:   Body structures, Functions, Activity limitations: Decreased functional mobility ; Decreased balance;Decreased endurance;Decreased strength;Decreased coordination;Decreased cognition;Decreased safe awareness  Decision Making: High Complexity  History: high  Exam: high  Clinical Presentation: high    Prognosis: Good    DISCHARGE RECOMMENDATIONS:  Discharge Recommendations: Patient would benefit from continued therapy after discharge    Assessment: Pt with intermittent lethargy and falling asleep often mid task or mid-sentence. Pt was unable to safely attempt transfers yet. Continue PT to progress mobility as tolerated as pt was previously indep with ambulation.   REQUIRES PT FOLLOW UP: Yes      PLAN OF CARE:  Plan  Times per week: 3-6  Current Treatment Recommendations: Strengthening, Transfer Training, Endurance Training, Neuromuscular Re-education, Patient/Caregiver Education & Training, Equipment Evaluation, Education, & procurement, Balance Training, Home Exercise Program, Gait Training, Functional Mobility Training, Stair training, Safety Education & Training  Safety Devices  Type of devices: Bed alarm in place, Call light within reach, Nurse notified    Goals:  Patient goals : unable to state  Long term goals  Long term goal 1: pt to be supervision with bed mobility  Long term goal 2: pt to be SBA with transfers  Long term goal 3: pt to participate in >10 minutes LE therex in order to improve mobility  Long term goal 4: pt to ambulate >50 ft with LRAD and SBA    [facility] (6 CLICK) Neelam Alston 28 Inpatient Mobility Raw Score : 9     Therapy Time:   Individual   Time In 1520   Time Out 1540   Minutes [address], PT, 11/06/20 at 4:17 PM         Definitions for assistance levels  Independent = pt does not require any physical supervision or assistance from another person for activity completion. Device may be needed.   Stand by assistance = pt requires verbal cues or instructions from another person, close to but not touching, to perform the activity  Minimal assistance= pt performs 75% or more of the activity; assistance is required to complete the activity  Moderate assistance= pt performs 50% of the activity; assistance is required to complete the activity  Maximal assistance = pt performs 25% of the activity; assistance is required to complete the activity  Dependent = pt requires total physical assistance to accomplish the task

## 2020-11-06 NOTE — PROGRESS NOTES
[facility] OCCUPATIONAL THERAPY EVALUATION - ACUTE     NAME: Yuan Sue  : 1934 (7 y.o.)  MRN: [id number]  CODE STATUS: Full Code  Room: Z710/Y632-13    Date of Service: 2020    Patient Diagnosis(es): Colon cancer ([address] [C18.9]   No chief complaint on file.     Patient Active Problem List    Diagnosis Date Noted    Colon cancer ([address] 2020    Glaucoma 2020    Cardiomyopathy ([address] 2020    Chronic kidney disease, stage III (moderate) 2019    Congestive heart failure ([address] 2019    Urothelial carcinoma ([address] 2019    Epigastric abdominal pain     Nausea     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     Mesenteric ischemia ([address] 02/15/2018    Abdominal pain     Atrial fibrillation ([address] 2018    Hypogonadism in male 2017    Sinus bradycardia on ECG 2016    Left bundle branch block 2016    Abnormal finding on EKG 2016    Primary bladder malignant neoplasm ([address] 2012    Bladder cancer ([address] 2012    Hypertension     Benign prostatic hyperplasia     GERD (gastroesophageal reflux disease)     Osteoarthritis     Type 2 diabetes mellitus without complication ([address]     Hyperlipidemia         Past Medical History:   Diagnosis Date    Abnormal finding on EKG 2016    Atrial fibrillation ([address] 2018    BPH (benign prostatic hypertrophy)     Cancer (HCC)     kidney - removed Left kidney    Colon polyps     Congestive heart failure ([address] 2019    GERD (gastroesophageal reflux disease)     Hyperlipidemia     Hypertension     Left bundle branch block 2016    Osteoarthritis     Sinus bradycardia on ECG 2016     Past Surgical History:   Procedure Laterality Date    ANGIOPLASTY  2018    percutaneous transluminal angioplasty of the SMA with stent implantation, [facility] CARDIAC DEFIBRILLATOR PLACEMENT      COLONOSCOPY      COLONOSCOPY N/A 10/20/2020    COLONOSCOPY DIAGNOSTIC performed by Sophia Kovacs MD at [facility]  01/25/2019    DR Aida Montanez 35 N/A 11/4/2020    LAPAROSCOPIC RIGHT COLECTOMY performed by Viola Thompson MD at [facility] ESOPHAGOGASTRODUODENOSCOPY TRANSORAL DIAGNOSTIC N/A 6/5/2018    EGD ESOPHAGOGASTRODUODENOSCOPY performed by Sophia Kovacs MD at [facility]  08/12/2016    left kidney, Dr. Harry Potts N/A 10/20/2020    EGD ESOPHAGOGASTRODUODENOSCOPY performed by Sophia Kovacs MD at [facility]  01/2019        Restrictions:Fall,IV,epidural,catheter        Safety Devices: Safety Devices  Safety Devices in place: Yes  Type of devices: All fall risk precautions in place   Initially in place: No    Subjective:Pt cooperative with session. Pt mumbling at times when speaking       Pain Reassessment: 0/10 pain reported          Prior Level of Function:  Social/Functional History  Lives With: Alone  Type of Home: House  Home Layout: Two level, Performs ADL's on one level  Bathroom Shower/Tub: Walk-in shower  Home Equipment: Rolling walker, Cane  ADL Assistance: Independent  Homemaking Assistance: Needs assistance(\"neighbor\")  Ambulation Assistance: Independent  Transfer Assistance: Independent  Active : No  Additional Comments: poor historian    OBJECTIVE:     Orientation Status:  Orientation  Overall Orientation Status: Impaired  Orientation Level: Oriented to person    Observation:  Observation/Palpation  Posture: Fair  Observation: forward and right lean noted    Cognition Status:  Cognition  Cognition Comment: follows one step commands inconsistently with increased time and repetition.   decreased problem solving and orientation    Perception Status:  Perception  Overall Perceptual Status: WFL    Sensation Status:  Sensation  Overall Sensation Status: WFL    Vision and Hearing Status:  Vision  Vision: Within Functional Limits  Hearing  Hearing: Within functional limits     ROM:   LUE AROM (degrees)  LUE AROM : WFL  Left Hand AROM (degrees)  Left Hand AROM: WFL  RUE AROM (degrees)  RUE AROM : WFL  Right Hand AROM (degrees)  Right Hand AROM: WFL    Strength:  LUE Strength  Gross LUE Strength: WFL  L Hand General: 3+/5  LUE Strength Comment: 3-3+/5  RUE Strength  Gross RUE Strength: WFL  R Hand General: 3+/5  RUE Strength Comment: 3-3+/5    Coordination, Tone, Quality of Movement: Tone RUE  RUE Tone: Normotonic  Tone LUE  LUE Tone: Normotonic  Coordination  Movements Are Fluid And Coordinated: No  Coordination and Movement description: Fine motor impairments, Decreased speed, Right UE, Left UE    Hand Dominance:  Hand Dominance  Hand Dominance: Right    ADL Status:  ADL  Feeding: Unable to assess(comment)  Grooming: Stand by assistance  UE Bathing: Contact guard assistance  LE Bathing: Moderate assistance  UE Dressing: Contact guard assistance  LE Dressing: Moderate assistance  Toileting: Unable to assess(comment)          Therapy key for assistance levels -   Independent = Pt. is able to perform task with no assistance but may require a device   Stand by assistance = Pt. does not perform task at an independent level but does not need physical assistance, requires verbal cues  Minimal, Moderate, Maximal Assistance = Pt. requires physical assistance (25%, 50%, 75% assist from helper) for task but is able to actively participate in task   Dependent = Pt. requires total assistance with task and is not able to actively participate with task completion     Functional Mobility:          Bed Mobility  Bed mobility  Supine to Sit: Moderate assistance  Sit to Supine:  Moderate assistance    Seated and Standing Balance:  Balance  Sitting Balance: Minimal assistance  Standing Balance: Unable to assess(comment)    Functional Endurance:  Activity Tolerance  Activity Tolerance: Treatment limited secondary to decreased cognition, Patient limited by fatigue    D/C Recommendations:  OT D/C RECOMMENDATIONS  REQUIRES OT FOLLOW UP: Yes    Equipment Recommendations:       OT Education:        OT Follow Up:  OT D/C RECOMMENDATIONS  REQUIRES OT FOLLOW UP: Yes       Assessment/Discharge Disposition:     Performance deficits / Impairments: Decreased functional mobility , Decreased safe awareness, Decreased balance, Decreased ADL status, Decreased coordination, Decreased cognition, Decreased posture, Decreased endurance, Decreased high-level IADLs, Decreased strength, Decreased fine motor control  Prognosis: Fair  Discharge Recommendations: Continue to assess pending progress  Decision Making: Medium Complexity  History: 5 complexities  Exam: 11 deficits  Assistance / Modification: Max A    Six Click Score    How much help for putting on and taking off regular lower body clothing?: A Lot  How much help for Bathing?: A Lot  How much help for Toileting?: A Lot  How much help for putting on and taking off regular upper body clothing?: A Lot  How much help for taking care of personal grooming?: A Little  How much help for eating meals?: None  AM-[facility] Inpatient Daily Activity Raw Score: 15  AM-[facility] Inpatient ADL T-Scale Score : 34.69  ADL Inpatient CMS 0-100% Score: 56.46    Plan:  Plan  Times per week: 1-4x/wk  Current Treatment Recommendations: Strengthening, Endurance Training, Neuromuscular Re-education, Self-Care / ADL, Balance Training, Functional Mobility Training, Safety Education & Training, Cognitive/Perceptual Training, Cognitive Reorientation    Goals:   Patient will:    - Improve functional endurance to tolerate/complete 8-12 mins of ADL's  - Be SBA in UB ADLs   - Be MIn A in LB ADLs  - Be Min A in ADL transfers without LOB  - Be Min A in toileting tasks  - Improve bilateral UE strength and endurance to Fair in order to participate in self-care activities as projected.   - Access appropriate D/C site with as few architectural barriers as possible.   - Sequence self-care tasks with out verbal cues    Patient Goal: Patient goals : Not stated at this time      Discussed and agreed upon: Yes Comments:     Therapy Time:   OT Individual Minutes  Time In: 1515  Time Out: 2618  Minutes: 18    Eval: 18 minutes     Electronically signed by:    JOSE MARTIN Palmer  09/9/8830, 4:07 PM Electronically signed by JOSE MARTIN Palmer on 44/3/82 at 4:05 PM EST

## 2020-11-06 NOTE — PROGRESS NOTES
light.    Skin:  Warm and dry. Labs/Imaging/Diagnostics    Labs:  CBC:  Recent Labs     11/05/20  0623 11/06/20  0816   WBC 9.1 5.6   RBC 2.78* 2.79*   HGB 8.6* 8.5*   HCT 25.5* 25.9*   MCV 91.6 93.0   RDW 14.1 14.2    144     CHEMISTRIES:  Recent Labs     11/05/20 0623 11/06/20  0816    136   K 4.1 4.0    104   CO2 24 20   BUN 26* 26*   CREATININE 1.63* 1.83*   GLUCOSE 117* 110*     PT/INR:No results for input(s): PROTIME, INR in the last 72 hours. APTT:No results for input(s): APTT in the last 72 hours. LIVER PROFILE:No results for input(s): AST, ALT, BILIDIR, BILITOT, ALKPHOS in the last 72 hours. Imaging Last 24 Hours:  No results found.   Assessment//Plan           Hospital Problems           Last Modified POA    Benign prostatic hyperplasia 11/4/2020 Yes    Overview Signed 10/14/2017  7:25 PM by [address] [facility]     Updating Deprecated Diagnoses         GERD (gastroesophageal reflux disease) 11/4/2020 Yes    Hyperlipidemia 11/4/2020 Yes    Atrial fibrillation ([address] 11/4/2020 Yes    Colon cancer ([address] 11/4/2020 Yes    Glaucoma 11/4/2020 Yes    Cardiomyopathy ([facility] 11/4/2020 Yes        Assessment & Plan    1) Colon cancer   S/p  Laparoscopic right colectomy with primary anastomosis  Still has bleeding  hgb is stable  FU surgery  2) GERD c/w prilosec  3) HLD  C/w statin  4) BPH  C/w flomax  5) afib c/w BB  Resume eliquis when ok with surgery  6) Cardiomyopahty with EF of 20 %   euvolemic    7) pt/ot for weakness  Electronically signed by Giles Jaffe MD on 11/5/20 at 10:54 AM EST

## 2020-11-06 NOTE — PROGRESS NOTES
Pt Name: Hilton [facility] Record Number: [id number]  Date of Birth 1934   Admit date 2020  9:39 AM  Today's Date: 2020     ASSESSMENT  1. Hospital day # 2  2 postop day #2 right colectomy for carcinoma  3. Loose bowel movements last night    PLAN  1. Low fiber diet  2. Epidural to be removed tomorrow/Bal to be removed tomorrow  3. Blood work today  4. Out of bed today    SUBJECTIVE  Chief complaint: Follow-up colectomy  Afebrile, vital signs are stable. He denies any nausea or vomiting, notable bowel movements yesterday He is tolerating a DIET LOW FIBER;. His pain is well controlled on current medications. has a past medical history of Abnormal finding on EKG, Atrial fibrillation ([address] BPH (benign prostatic hypertrophy), Cancer ([address] Colon polyps, Congestive heart failure ([address] GERD (gastroesophageal reflux disease), Hyperlipidemia, Hypertension, Left bundle branch block, Osteoarthritis, and Sinus bradycardia on ECG. CURRENT MEDS  Scheduled Meds:   pantoprazole  40 mg Oral QAM AC    atorvastatin  20 mg Oral Daily    tamsulosin  0.4 mg Oral Daily    sodium chloride flush  10 mL Intravenous 2 times per day    enoxaparin  40 mg Subcutaneous Daily    latanoprost  1 drop Both Eyes Nightly     Continuous Infusions:   sodium chloride 75 mL/hr at 20 0610    fentanyl epidural builder 10 mL/hr at 20 0542     PRN Meds:.acetaminophen, fentanNYL, meperidine, sodium chloride flush, promethazine **OR** ondansetron, naloxone, diphenhydrAMINE, diphenhydrAMINE, nalbuphine, ondansetron    OBJECTIVE  CURRENT VITALS:  height is 5' 9\" (1.753 m) and weight is 160 lb (72.6 kg). His oral temperature is 98.2 °F (36.8 °C). His blood pressure is 91/46 (abnormal) and his pulse is 86. His respiration is 17 and oxygen saturation is 93%.    Temperature Range (24h):Temp: 98.2 °F (36.8 °C) Temp  Av.2 °F (36.8 °C)  Min: 98.2 °F (36.8 °C)  Max: 98.2 °F (36.8 °C)  BP Range (36A): Systolic (24hrs), Av , Min:91 , ZKN:299     Diastolic (32EIK), UQH:80, Min:46, Max:93    Pulse Range (24h): Pulse  Av.7  Min: 81  Max: 87  Respiration Range (24h): Resp  Av.7  Min: 16  Max: 17    GENERAL: alert, no distress  LUNGS: clear to ausculation, without wheezes, rales or rhonci  HEART: normal rate and regular rhythm  ABDOMEN: soft, non-tender, non-distended, bowel sounds present in all 4 quadrants and no guarding or peritoneal signs  EXTERMITY: no cyanosis, clubbing or edema  In: 1923 [P.O.:100; I.V.:1823]  Out: 650 [Urine:650]  Date 20 0000 - 20 2359   Shift 5375-1916 8334-5158 6373-0776 24 Hour Total   INTAKE   P.O.(mL/kg/hr) 100   100   I. V.(mL/kg) 1823(25.1)   1823(25.1)   Shift Total(mL/kg) 5352(48.0)   8559(09.8)   OUTPUT   Urine(mL/kg/hr) 450   450   Shift Total(mL/kg) 450(6.2)   450(6.2)   Weight (kg) 72.6 72.6 72.6 72.6       LABS  Recent Labs     20  0623   WBC 9.1   HGB 8.6*   HCT 25.5*         K 4.1      CO2 24   BUN 26*   CREATININE 1.63*   CALCIUM 8.4*      No results for input(s): PTT, INR in the last 72 hours. Invalid input(s): PT  No results for input(s): AST, ALT, BILITOT, BILIDIR, AMYLASE, LIPASE, LDH, LACTA in the last 72 hours. RADIOLOGY  Ct Abdomen Pelvis Wo Contrast Additional Contrast? Radiologist Recommendation    Result Date: 10/19/2020  EXAM: CT ABDOMEN PELVIS WO CONTRAST CLINICAL:R10.30 Lower abdominal pain ICD10 TECHNIQUE: CT scans of the abdomen and pelvis were obtained without contrast as ordered All CT scans at this facility use dose modulation, iterative reconstruction, and/or weight based dosing when appropriate to reduce radiation dose to as low as reasonably achievable. FINDINGS: Some nonspecific reticular markings at the lung bases noted. Right-sided heart electrodes in place. .       No evidence of nonenhancing pathology in the liver or spleen. No suspicious lesions in the pancreas on this unenhanced study. Gallbladder and biliary tree unremarkable. There is gas and undigested debris within the stomach and there is gas in the small hiatal hernia. No inflammation in the epigastric soft tissues. Indwelling ureteral stent on the right side extends from the midpole the right kidney to the urinary bladder in satisfactory position. No hydronephrosis in the right kidney or evidence of renal stone disease. Left kidney is surgically absent. Atherosclerotic changes in the aorta noted but no evidence of aneurysm. There is stent material in the SMA proximally. There is calcification in the renal arteries bilaterally. No retroperitoneal mass or fluid collection. A few fluid filled small bowel loops in the left abdomen with no bowel wall edema or small bowel dilatation. No evidence of small bowel ischemia. The appendix is normal. There is gas and stool throughout the colon. There are diverticula in the colon with the majority located in the redundant sigmoid colon. No evidence of acute diverticulitis. No evidence of colonic obstruction. Anterior abdominal wall intact. The pelvic viscera are intact. No mass or inflammatory changes seen in the pelvis. No significant adenopathy along the pelvic sidewall. Bony structures intact. Degenerative changes overlie the lower dorsal spine and are fairly severe the lower lumbar spine at L5-S1. No compression fracture or infiltrative bony process. Mild degenerative hip disease. IMPRESSION: NO ACUTE PROCESS IN THE ABDOMEN OR PELVIS.      Electronically signed by Michelle Farnsworth MD on 11/6/2020 at 7:58 AM

## 2020-11-06 NOTE — FLOWSHEET NOTE
1940 Shift assessment complete. Pt is AxOx2-3. I have to continue to remind him to stay in bed. Can be confused at times. VS are stable. His aguero is in place, and he did have a BM for me. SCDs are on. Dressing is CDI. No other needs at this time. Call light within reach. Will continue to monitor. 0604 pt is having very runny, watery stools. Dressing on abd is CDI. Aguero in place only had 450 out. Vs stable. BP runs on the lower side. SCDs are on meds given. Will continue to monitor.

## 2020-11-06 NOTE — PROGRESS NOTES
Shift assessment complete. Pt has watery brown BM this morning. Denies pain but was having a bit of nausea, medicated with Zofran IV. Pt seems to be more lethargic this afternoon but is easy to arouse. Pt is pocketing food and said he is having trouble swallowing. Notified Dr. Phyllis Gauthier, bedside swallow eval ordered. VSS. Fall precautions in place, will continue to monitor. Call light is within reach. Pt denies any needs at this time.      E. Electronically signed by Azar Coffman RN on 11/6/2020 at 1:19 PM

## 2020-11-07 LAB
ANION GAP SERPL CALCULATED.3IONS-SCNC: 16 MEQ/L (ref 9–15)
ANISOCYTOSIS: ABNORMAL
BANDED NEUTROPHILS RELATIVE PERCENT: 19 %
BASOPHILS ABSOLUTE: 0 K/UL (ref 0–0.2)
BASOPHILS RELATIVE PERCENT: 1 %
BUN BLDV-MCNC: 30 MG/DL (ref 8–23)
CALCIUM SERPL-MCNC: 8.1 MG/DL (ref 8.5–9.9)
CHLORIDE BLD-SCNC: 105 MEQ/L (ref 95–107)
CO2: 18 MEQ/L (ref 20–31)
CREAT SERPL-MCNC: 1.64 MG/DL (ref 0.7–1.2)
EOSINOPHILS ABSOLUTE: 0 K/UL (ref 0–0.7)
EOSINOPHILS RELATIVE PERCENT: 2 %
GFR AFRICAN AMERICAN: 48.4
GFR NON-AFRICAN AMERICAN: 40
GLUCOSE BLD-MCNC: 128 MG/DL (ref 70–99)
HCT VFR BLD CALC: 25.3 % (ref 42–52)
HEMOGLOBIN: 8.4 G/DL (ref 14–18)
LYMPHOCYTES ABSOLUTE: 0.3 K/UL (ref 1–4.8)
LYMPHOCYTES RELATIVE PERCENT: 14 %
MCH RBC QN AUTO: 30.6 PG (ref 27–31.3)
MCHC RBC AUTO-ENTMCNC: 33.4 % (ref 33–37)
MCV RBC AUTO: 91.8 FL (ref 80–100)
METAMYELOCYTES RELATIVE PERCENT: 1 %
MICROCYTES: ABNORMAL
MONOCYTES ABSOLUTE: 0.3 K/UL (ref 0.2–0.8)
MONOCYTES RELATIVE PERCENT: 16.7 %
MYELOCYTE PERCENT: 4 %
NEUTROPHILS ABSOLUTE: 1.3 K/UL (ref 1.4–6.5)
NEUTROPHILS RELATIVE PERCENT: 43 %
NUCLEATED RED BLOOD CELLS: 1 /100 WBC
PDW BLD-RTO: 14.3 % (ref 11.5–14.5)
PLATELET # BLD: 139 K/UL (ref 130–400)
POIKILOCYTES: ABNORMAL
POTASSIUM SERPL-SCNC: 3.7 MEQ/L (ref 3.4–4.9)
RBC # BLD: 2.76 M/UL (ref 4.7–6.1)
SODIUM BLD-SCNC: 139 MEQ/L (ref 135–144)
WBC # BLD: 1.9 K/UL (ref 4.8–10.8)

## 2020-11-07 PROCEDURE — 6370000000 HC RX 637 (ALT 250 FOR IP): Performed by: NURSE PRACTITIONER

## 2020-11-07 PROCEDURE — 92610 EVALUATE SWALLOWING FUNCTION: CPT

## 2020-11-07 PROCEDURE — 85025 COMPLETE CBC W/AUTO DIFF WBC: CPT

## 2020-11-07 PROCEDURE — 80048 BASIC METABOLIC PNL TOTAL CA: CPT

## 2020-11-07 PROCEDURE — 2580000003 HC RX 258: Performed by: COLON & RECTAL SURGERY

## 2020-11-07 PROCEDURE — 6360000002 HC RX W HCPCS: Performed by: SURGERY

## 2020-11-07 PROCEDURE — 6360000002 HC RX W HCPCS: Performed by: COLON & RECTAL SURGERY

## 2020-11-07 PROCEDURE — 6370000000 HC RX 637 (ALT 250 FOR IP): Performed by: COLON & RECTAL SURGERY

## 2020-11-07 PROCEDURE — 99024 POSTOP FOLLOW-UP VISIT: CPT | Performed by: SURGERY

## 2020-11-07 PROCEDURE — 1210000000 HC MED SURG R&B

## 2020-11-07 PROCEDURE — 36415 COLL VENOUS BLD VENIPUNCTURE: CPT

## 2020-11-07 RX ORDER — BISACODYL 10 MG
10 SUPPOSITORY, RECTAL RECTAL DAILY
Status: DISCONTINUED | OUTPATIENT
Start: 2020-11-07 | End: 2020-11-07

## 2020-11-07 RX ORDER — HYDROCODONE BITARTRATE AND ACETAMINOPHEN 5; 325 MG/1; MG/1
1 TABLET ORAL EVERY 6 HOURS PRN
Status: DISCONTINUED | OUTPATIENT
Start: 2020-11-07 | End: 2020-11-10 | Stop reason: HOSPADM

## 2020-11-07 RX ORDER — METOCLOPRAMIDE HYDROCHLORIDE 5 MG/ML
5 INJECTION INTRAMUSCULAR; INTRAVENOUS EVERY 8 HOURS
Status: DISCONTINUED | OUTPATIENT
Start: 2020-11-07 | End: 2020-11-10 | Stop reason: HOSPADM

## 2020-11-07 RX ADMIN — TAMSULOSIN HYDROCHLORIDE 0.4 MG: 0.4 CAPSULE ORAL at 09:52

## 2020-11-07 RX ADMIN — LATANOPROST 1 DROP: 50 SOLUTION OPHTHALMIC at 21:23

## 2020-11-07 RX ADMIN — PANTOPRAZOLE SODIUM 40 MG: 40 TABLET, DELAYED RELEASE ORAL at 09:52

## 2020-11-07 RX ADMIN — ONDANSETRON 4 MG: 2 INJECTION INTRAMUSCULAR; INTRAVENOUS at 13:20

## 2020-11-07 RX ADMIN — METOCLOPRAMIDE HYDROCHLORIDE 5 MG: 5 INJECTION INTRAMUSCULAR; INTRAVENOUS at 11:57

## 2020-11-07 RX ADMIN — SODIUM CHLORIDE: 9 INJECTION, SOLUTION INTRAVENOUS at 13:19

## 2020-11-07 RX ADMIN — ENOXAPARIN SODIUM 40 MG: 40 INJECTION SUBCUTANEOUS at 21:23

## 2020-11-07 RX ADMIN — ACETAMINOPHEN 650 MG: 325 TABLET, FILM COATED ORAL at 16:35

## 2020-11-07 RX ADMIN — ATORVASTATIN CALCIUM 20 MG: 20 TABLET, FILM COATED ORAL at 21:23

## 2020-11-07 RX ADMIN — METOCLOPRAMIDE HYDROCHLORIDE 5 MG: 5 INJECTION INTRAMUSCULAR; INTRAVENOUS at 21:22

## 2020-11-07 RX ADMIN — LATANOPROST 1 DROP: 50 SOLUTION OPHTHALMIC at 21:26

## 2020-11-07 ASSESSMENT — ENCOUNTER SYMPTOMS
COUGH: 0
VOMITING: 0
SHORTNESS OF BREATH: 0
NAUSEA: 0
DIARRHEA: 0

## 2020-11-07 ASSESSMENT — PAIN SCALES - GENERAL
PAINLEVEL_OUTOF10: 0
PAINLEVEL_OUTOF10: 7

## 2020-11-07 NOTE — CONSULTS
Elaina Howell [address] [place] [address], [address] [facility]                                  CONSULTATION    PATIENT NAME: Tania Hines                     :        1934  MED REC NO:   [id number]                            ROOM:       W473  ACCOUNT NO:   [de-identified]                           ADMIT DATE: 2020  PROVIDER:     Jak Singh DO    CONSULT DATE:  2020    RENAL CONSULT    HISTORY OF PRESENT ILLNESS:  An 51-year-old  male seeming frail  at this time, but alert. He has some cognitive deficit on questioning  according to the nurse and on my exam.  He is postop day 2 for right  colectomy for carcinoma. The patient is being seen at the request of  the surgeon due to his renal insufficiency. His serum creatinine is  1.64 with a GFR of 40. His electrolytes are normal.  Reviewing old  charts 6 months ago, the patient had a serum creatinine of 1.5 and GFR  of 43 on 2020. The patient does have evidence on x-rays, which  show absent left kidney, reason uncertain and a long dwelling stent from  the right kidney to the bladder without hydronephrosis. This study was  performed on 10/19/2020. On questioning the patient, which there is  some concern over his reliance, he denies any gross hematuria, dysuria,  kidney stones or recent nonsteroidal anti-inflammatory medication usage. The patient does have a history of diabetes and hypertension. He had a  suspected malignancy which may relate to the left nephrectomy. The  patient appears to be in no distress at this time. PAST SURGICAL HISTORY:  Hemicolectomy for cancer, cystoscopy, ureteral  stent of the right, left nephrectomy, cardiac defibrillator in place  with pacemaker, previous angioplasties of the heart, history of atrial  fibrillation. HABITS:  No smoking. Does use alcohol socially. No nonsteroidals or  opioids. MEDICATIONS:  At the time of his admission;  Flomax, Coreg, Prilosec,  Zocor, Xalatan ophthalmic, ProAmatine, Eliquis, multiple vitamins. ALLERGIES:  MORPHINE.    PHYSICAL EXAMINATION:  VITAL SIGNS:  5 feet and 9 inches, 160 pounds. At this time, blood  pressure is 115/60, heart rate 90, respirations 18, afebrile. The  patient on 11/05/2020 and 11/04/2020 was mildly hypotensive. HEENT:  Normocephalic. Pupils equal and react to light. Extraocular  muscles intact. NECK:  Supple. No JVD or adenopathy. CHEST:  Left upper quadrant of the chest shows a pacemaker defibrillator  in place. CARDIOVASCULAR:  Heart is regular with a 1/6 systolic murmur. ABDOMEN:  Shows sterile dressings. Recent colectomy. Flank shows a  left healed scar from nephrectomy. EXTREMITIES:  Lower extremities showed no edema. Some muscle atrophy. Pulses are present, but decreased. IMPRESSION:  1.  CKD III related to left nephrectomy and possible underlying diabetic  disease with associated decreased perfusion from a low cardiac output. 2.  Status post right colectomy. 3.  Organic heart disease, atrial fibrillation, pacemaker defibrillator  in place. 4.  Diabetes mellitus type 2.  5.  Bladder cancer. 6.  Glaucoma. 7. BPH. PLAN:  We will maintain vital signs, preventing hypotension, avoid  nephrotoxic agents. Follow BMP. Obtain urine for albumin-creatinine  ratio. Control blood sugars.         Sloan Yarbrough DO    D: 11/07/2020 10:35:21       T: 11/07/2020 10:47:09     [id number]  Job#: [id number]     Doc#: [id number]    CC:

## 2020-11-07 NOTE — PROGRESS NOTES
Pt in bed resting. Assessment documented. Medicated per MAR. No needs at this time. Fall prevention in place. Bed alarm on. He c/o dyspnea at start of shift, crackles heard bilaterally, decrease in urine outage as well. NP aware, ordered 1 time dose of lasix, on reassessment lung sounds were clear. Will continue to monitor. Electronically signed by Shona Bradshaw RN on 11/7/2020 at 3:41 AM

## 2020-11-07 NOTE — PROGRESS NOTES
POD 3:  D/C catheter per order, epidural has been off since yesterday. Lovenox held. Pt is alert and pleasant and does not appear to be in any significant pain.

## 2020-11-07 NOTE — PROGRESS NOTES
Progress Note  Date:2020       Room:W473/W473-01  Patient Name:Luis Bautista     YOB: 1934     Age:86 y.o. Pt was seen and evaluated, no overnight events  Subjective    Subjective:  Symptoms:  He reports weakness. No shortness of breath, malaise, cough, chest pain, headache, chest pressure, anorexia, diarrhea or anxiety. Diet:  No nausea or vomiting. Review of Systems   Respiratory: Negative for cough and shortness of breath. Cardiovascular: Negative for chest pain. Gastrointestinal: Negative for anorexia, diarrhea, nausea and vomiting. Neurological: Positive for weakness. Objective         Vitals Last 24 Hours:  TEMPERATURE:  Temp  Av °F (36.7 °C)  Min: 97.2 °F (36.2 °C)  Max: 98.6 °F (37 °C)  RESPIRATIONS RANGE: Resp  Av.8  Min: 16  Max: 18  PULSE OXIMETRY RANGE: SpO2  Av.2 %  Min: 93 %  Max: 100 %  PULSE RANGE: Pulse  Av.9  Min: 48  Max: 107  BLOOD PRESSURE RANGE: Systolic (19WVV), BPF:054 , Min:107 , KVS:732   ; Diastolic (62MYB), JBW:64, Min:42, Max:74    I/O (24Hr): Intake/Output Summary (Last 24 hours) at 2020 1239  Last data filed at 2020 8219  Gross per 24 hour   Intake 240 ml   Output 1225 ml   Net -985 ml     Objective:  General Appearance:  Comfortable, well-appearing and in no acute distress. Vital signs: (most recent): Blood pressure 133/74, pulse 92, temperature 97.2 °F (36.2 °C), temperature source Oral, resp. rate 18, height 5' 9\" (1.753 m), weight 160 lb (72.6 kg), SpO2 100 %. HEENT: Normal HEENT exam.    Lungs:  Normal effort and normal respiratory rate. Breath sounds clear to auscultation. Heart: Normal rate. Abdomen: Abdomen is distended. Bowel sounds are normal.   There is no epigastric area or suprapubic area tenderness. Extremities: There is no deformity. Pulses: Distal pulses are intact. Neurological: Patient is alert. Pupils:  Pupils are equal, round, and reactive to light.     Skin: Warm and dry. Labs/Imaging/Diagnostics    Labs:  CBC:  Recent Labs     11/05/20 0623 11/06/20  0816 11/07/20  0748   WBC 9.1 5.6 1.9*   RBC 2.78* 2.79* 2.76*   HGB 8.6* 8.5* 8.4*   HCT 25.5* 25.9* 25.3*   MCV 91.6 93.0 91.8   RDW 14.1 14.2 14.3    144 139     CHEMISTRIES:  Recent Labs     11/05/20 0623 11/06/20  0816 11/07/20  0748    136 139   K 4.1 4.0 3.7    104 105   CO2 24 20 18*   BUN 26* 26* 30*   CREATININE 1.63* 1.83* 1.64*   GLUCOSE 117* 110* 128*     PT/INR:No results for input(s): PROTIME, INR in the last 72 hours. APTT:No results for input(s): APTT in the last 72 hours. LIVER PROFILE:No results for input(s): AST, ALT, BILIDIR, BILITOT, ALKPHOS in the last 72 hours. Imaging Last 24 Hours:  No results found.   Assessment//Plan           Hospital Problems           Last Modified POA    Benign prostatic hyperplasia 11/4/2020 Yes    Overview Signed 10/14/2017  7:25 PM by [facility]     Updating Deprecated Diagnoses         GERD (gastroesophageal reflux disease) 11/4/2020 Yes    Hyperlipidemia 11/4/2020 Yes    Atrial fibrillation ([address] 11/4/2020 Yes    Colon cancer ([address] 11/4/2020 Yes    Glaucoma 11/4/2020 Yes    Cardiomyopathy ([address] 11/4/2020 Yes        Assessment & Plan    1) Colon cancer   S/p  Laparoscopic right colectomy with primary anastomosis  Still has bleeding  hgb is stable  FU surgery  2) GERD c/w prilosec  3) HLD  C/w statin  4) BPH  C/w flomax  5) afib c/w BB  Resume eliquis when ok with surgery  6) Cardiomyopahty with EF of 20 %   euvolemic    7) pt/ot for weakness  Electronically signed by A  8) HONEY on CKD renal eval  Cindy Wilson MD on 11/5/20 at 10:54 AM EST

## 2020-11-07 NOTE — PROGRESS NOTES
[facility]   Facility/Department: [facility] MED SURG UNIT  Speech Language Pathology  Clinical Bedside Swallow Evaluation    NAME:Luis Hardin  : 1934 (80 y.o.)   MRN: [id number]  [facility]  ADMISSION DATE: 2020  PATIENT DIAGNOSIS(ES): Colon cancer ([address] [C18.9]  No chief complaint on file.     Patient Active Problem List    Diagnosis Date Noted    Colon cancer ([address] 2020    Glaucoma 2020    Cardiomyopathy ([address] 2020    Chronic kidney disease, stage III (moderate) 2019    Congestive heart failure ([address] 2019    Urothelial carcinoma ([address] 2019    Epigastric abdominal pain     Nausea     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     Mesenteric ischemia ([address] 02/15/2018    Abdominal pain     Atrial fibrillation ([address] 2018    Hypogonadism in male 2017    Sinus bradycardia on ECG 2016    Left bundle branch block 2016    Abnormal finding on EKG 2016    Primary bladder malignant neoplasm ([address] 2012    Bladder cancer ([address] 2012    Hypertension     Benign prostatic hyperplasia     GERD (gastroesophageal reflux disease)     Osteoarthritis     Type 2 diabetes mellitus without complication ([address]     Hyperlipidemia      Past Medical History:   Diagnosis Date    Abnormal finding on EKG 2016    Atrial fibrillation ([address] 2018    BPH (benign prostatic hypertrophy)     Cancer (HCC)     kidney - removed Left kidney    Colon polyps     Congestive heart failure ([address] 2019    GERD (gastroesophageal reflux disease)     Hyperlipidemia     Hypertension     Left bundle branch block 2016    Osteoarthritis     Sinus bradycardia on ECG 2016     Past Surgical History:   Procedure Laterality Date    ANGIOPLASTY  2018    percutaneous transluminal angioplasty of the SMA with stent implantation, [facility]    CARDIAC DEFIBRILLATOR PLACEMENT      COLONOSCOPY      COLONOSCOPY N/A 10/20/2020    COLONOSCOPY DIAGNOSTIC performed by Abdoul Mccoy MD at [facility]  01/25/2019    DR Aida [address]anez [address] N/A 11/4/2020    LAPAROSCOPIC RIGHT COLECTOMY performed by Fara Hagan MD at [address] ESOPHAGOGASTRODUODENOSCOPY TRANSORAL DIAGNOSTIC N/A 6/5/2018    EGD ESOPHAGOGASTRODUODENOSCOPY performed by Abdoul Mccoy MD at [place] [address]  08/12/2016    left kidney, Dr. Pam Baldwin N/A 10/20/2020    EGD ESOPHAGOGASTRODUODENOSCOPY performed by Abdoul Mccoy MD at [place] [id number]  01/2019     Allergies   Allergen Reactions    Morphine      Other reaction(s): Delirium       DATE ONSET: 11/4/2020    Date of Evaluation: 11/7/2020   Evaluating Therapist: Gabriella Orlando CF-SLP    Recommended Diet and Intervention  Diet Solids Recommendation: Dysphagia Pureed (Dysphagia I)  Liquid Consistency Recommendation: Thin  Recommended Form of Meds: PO  Recommendations: Dysphagia treatment  Therapeutic Interventions: Diet tolerance monitoring, Oral motor exercises    Compensatory Swallowing Strategies  Compensatory Swallowing Strategies: Alternate solids and liquids;Eat/Feed slowly;Upright as possible for all oral intake;Small bites/sips;Swallow 2 times per bite/sip    Reason for Referral  Kathryn Aranda was referred for a bedside swallow evaluation to assess the efficiency of his swallow function, identify signs and symptoms of aspiration and make recommendations regarding safe dietary consistencies, effective compensatory strategies, and safe eating environment. General  Chart Reviewed: Yes  Subjective  Subjective: Pt was alert, cooperative, and pleasant for BSE  Behavior/Cognition: Alert;Pleasant mood; Cooperative  Respiratory Status: Room air  O2 Device: None (Room air)  Communication Observation: Functional  Follows Directions: Simple  Dentition: Some missing teeth; Adequate  Patient Positioning: Upright in bed  Baseline Vocal Quality: Normal  Prior Dysphagia History: Per chart review, pt has been pocketing food. Consistencies Administered: Dysphagia Minced and Moist (Dysphagia II); Dysphagia Pureed (Dysphagia I); Thin - cup;Nectar - cup    Current Diet level:  Current Diet : Regular  Current Liquid Diet : Thin    Oral Motor Deficits  Oral/Motor  Oral Motor: Exceptions to [facility]  Labial Coordination: Reduced  Lingual Symmetry: Abnormal symmetry right  Lingual Strength: Reduced  Lingual Coordination: Reduced    Oral Phase Dysfunction  Oral Phase  Oral Phase: Exceptions  Oral Phase Dysfunction  Pocketing Left: Soft solid  Decreased Anterior to Posterior Transit: Soft solid  Lingual/Palatal Residue: Soft solid;Puree  Oral Phase  Oral Phase - Comment: Pt presents with moderate oral phase dysphagia characterized by decreased anterior to posterior transit, pocketing in left buccal cavity, and mod palatal residue following trials of soft solid. Pt required much cueing to implement lingual sweep, and reswallow, to clear residue from soft solid. Pt was able to clear residue with cued use of liquid wash. Indicators of Pharyngeal Phase Dysfunction   Pharyngeal Phase  Pharyngeal Phase: WFL  Pharyngeal Phase   Pharyngeal: Pharyngeal phase WFL at this time. Laryngeal elevation was present during evaluation, noted through observation and palpation. No overt s/s of aspiration observed following any of the presented consistencies. Impression  Dysphagia Diagnosis: Moderate oral stage dysphagia  Dysphagia Impression : Moderate oral phase dysphagia; Pharyngeal phase WFL  Dysphagia Outcome Severity Scale: Level 3: Moderate dysphagia- Total assisstance, supervision or strategies. Two or more diet consistencies restricted     Treatment Plan  Requires SLP Intervention: Yes  Duration/Frequency of Treatment: 2-3x/wk for LOS or until all goals met  D/C Recommendations:  To be determined       Treatment/Goals  Short-term Goals  Timeframe for Short-term Goals: 1 week  Goal 1: Pt will tolerate puree consistencies and thin liquids with no overt s/s of aspiration and adequate oral clearance of bolus in all given opportunities. Goal 2: Pt will complete oral motor strengthening exercises with 80% acc given cues as needed in order to increase lingual/labial/buccal musculature to decrease risk of aspiration. Goal 3: Pt will complete tongue press exercise with 80% acc given cues as needed in order to promote anterior to posterior transit of bolus and decrease risk of aspiration  Goal 4: Pt will implement safe swallow strategies (small bites/sips, cyclical ingestion, lingual sweep, double swallow) with 80% acc given cues as needed in order to decrease risk of aspiration. Goal 5: Pt will tolerate 5/5 trials of minced and moist consistencies given cues as needed in order to achieve least restrictive diet consistency. Long-term Goals  Timeframe for Long-term Goals: 2 weeks  Goal 1: Pt will tolerate least restrictive diet consistency with no adverse outcomes    Prognosis  Prognosis  Prognosis for safe diet advancement: guarded  Barriers to reach goals: age  Individuals consulted  Consulted and agree with results and recommendations: Patient;RN(RN - Keisha Vuong)    Education  Patient Education: Pt was educated on results of BSE  Patient Education Response: Verbalizes understanding  Safety Devices in place: Yes  Type of devices: Call light within reach;Nurse notified; Bed alarm in place; Other (comment)(AVASYS in room)    Pain Assessment:  Initial Assessment:  Patient denies pain. Re-assessment:  Patient denies pain.        Therapy Time  SLP Individual Minutes  Time In: 7165  Time Out: 7951  Minutes: 10        Signature: Electronically signed by CARSON Meza-SLP on 11/7/2020 at 9:40 AM

## 2020-11-07 NOTE — ADDENDUM NOTE
Addendum  created 11/07/20 0854 by MOSHE Georges - CRNA    Clinical Note Signed, Intraprocedure LDAs edited, LDA properties accepted

## 2020-11-07 NOTE — PROGRESS NOTES
Pt Name: Ghanshyam [facility] Record Number: [id number]  Date of Birth 1934   Admit date 2020  9:39 AM  Today's Date: 2020     ASSESSMENT  1. Post op day # 3  2. Niesha Gandhi had Procedure(s):  LAPAROSCOPIC RIGHT COLECTOMY   3.  Leukopenia      PLAN  1. reglan added  2. Monitor abdomen    SUBJECTIVE  Chief complaint: nauseated  Afebrile, vital signs are stable. He denies any vomiting, has passed flatus and had a bowel movement. He is tolerating a lo fiber diet. His pain is well controlled on current medications. White blood cell count 1,900.      has a past medical history of Abnormal finding on EKG, Atrial fibrillation ([address] BPH (benign prostatic hypertrophy), Cancer ([address] Colon polyps, Congestive heart failure ([address] GERD (gastroesophageal reflux disease), Hyperlipidemia, Hypertension, Left bundle branch block, Osteoarthritis, and Sinus bradycardia on ECG. CURRENT MEDS  Scheduled Meds:   bisacodyl  10 mg Rectal Daily    pantoprazole  40 mg Oral QAM AC    atorvastatin  20 mg Oral Daily    tamsulosin  0.4 mg Oral Daily    sodium chloride flush  10 mL Intravenous 2 times per day    enoxaparin  40 mg Subcutaneous Daily    latanoprost  1 drop Both Eyes Nightly     Continuous Infusions:   sodium chloride 75 mL/hr at 20     PRN Meds:. HYDROcodone 5 mg - acetaminophen, acetaminophen, sodium chloride flush, promethazine **OR** ondansetron    OBJECTIVE  CURRENT VITALS:  height is 5' 9\" (1.753 m) and weight is 160 lb (72.6 kg). His oral temperature is 97.5 °F (36.4 °C). His blood pressure is 133/74 and his pulse is 97. His respiration is 18 and oxygen saturation is 99%.    Temperature Range (24h):Temp: 97.5 °F (36.4 °C) Temp  Av.2 °F (36.8 °C)  Min: 97.5 °F (36.4 °C)  Max: 98.6 °F (37 °C)  BP Range (62H): Systolic (57PYF), WRW:919 , Min:107 , XUI:143     Diastolic (36UJD), ELEAZAR:78, Min:42, Max:74    Pulse Range (24h): Pulse  Av  Min: 48  Max: 107  Respiration Range (24h): Resp  Av.8  Min: 16  Max: 18    GENERAL: alert, no distress  LUNGS: clear to ausculation, without wheezes, rales or rhonci  HEART: normal rate and regular rhythm  ABDOMEN: distended, soft, incisional tenderness, bowel sounds present in all 4 quadrants and no guarding or peritoneal signs  INCISION: healing well, no significant drainage, no significant erythema  EXTERMITY: no cyanosis, clubbing or edema    In: 240 [P.O.:240]  Out: 1225 [Urine:1225]  Date 20 0000 - 20 2359   Shift 6736-9383 8843-4547 6872-0754 24 Hour Total   INTAKE   P.O.(mL/kg/hr)  240  240   Shift Total(mL/kg)  240(3.3)  240(3.3)   OUTPUT   Urine(mL/kg/hr) 1075(1.9)   1075   Emesis/NG output(mL/kg) 0(0)   0(0)   Other(mL/kg) 0(0)   0(0)   Stool(mL/kg) 0(0)   0(0)   Blood(mL/kg) 0(0)   0(0)   Shift Total(mL/kg) 5308(67.4)   8539(91.1)   Weight (kg) 72.6 72.6 72.6 72.6       LABS  Recent Labs     20  0623 20  0816 20  0748   WBC 9.1 5.6 1.9*   HGB 8.6* 8.5* 8.4*   HCT 25.5* 25.9* 25.3*    144 139    136 139   K 4.1 4.0 3.7    104 105   CO2 24 20 18*   BUN 26* 26* 30*   CREATININE 1.63* 1.83* 1.64*   CALCIUM 8.4* 8.0* 8.1*      No results for input(s): PTT, INR in the last 72 hours. Invalid input(s): PT  No results for input(s): AST, ALT, BILITOT, BILIDIR, AMYLASE, LIPASE, LDH, LACTA in the last 72 hours. RADIOLOGY  Ct Abdomen Pelvis Wo Contrast Additional Contrast? Radiologist Recommendation    Result Date: 10/19/2020  EXAM: CT ABDOMEN PELVIS WO CONTRAST CLINICAL:R10.30 Lower abdominal pain ICD10 TECHNIQUE: CT scans of the abdomen and pelvis were obtained without contrast as ordered All CT scans at this facility use dose modulation, iterative reconstruction, and/or weight based dosing when appropriate to reduce radiation dose to as low as reasonably achievable. FINDINGS: Some nonspecific reticular markings at the lung bases noted. Right-sided heart electrodes in place. .       No evidence

## 2020-11-07 NOTE — PROGRESS NOTES
Patient in bed and repositioned to the right side. Patient states he is tired. Patient denies pain. No concerns at this time.

## 2020-11-07 NOTE — PROGRESS NOTES
Patient in bed sitting up. Pt tolerated small sips of water well. Bed alarm on. No needs at this time.

## 2020-11-07 NOTE — PLAN OF CARE
BSE Complete
Problem: Falls - Risk of:  Goal: Will remain free from falls  Description: Will remain free from falls  Outcome: Ongoing  Goal: Absence of physical injury  Description: Absence of physical injury  Outcome: Ongoing     Problem: Pain:  Goal: Pain level will decrease  Description: Pain level will decrease  Outcome: Ongoing  Goal: Control of acute pain  Description: Control of acute pain  Outcome: Ongoing  Goal: Control of chronic pain  Description: Control of chronic pain  Outcome: Ongoing     Problem: Infection - Surgical Site:  Goal: Will show no infection signs and symptoms  Description: Will show no infection signs and symptoms  Outcome: Ongoing     Problem:  Activity:  Goal: Ability to tolerate increased activity will improve  Description: Ability to tolerate increased activity will improve  Outcome: Ongoing  Goal: Ability to implement measures to reduce episodes of fatigue will improve  Description: Ability to implement measures to reduce episodes of fatigue will improve  Outcome: Ongoing     Problem: Coping:  Goal: Ability to cope will improve  Description: Ability to cope will improve  Outcome: Ongoing     Problem: Health Behavior:  Goal: Adoption of positive health habits will improve  Description: Adoption of positive health habits will improve  Outcome: Ongoing  Goal: Identification of resources available to assist in meeting health care needs will improve  Description: Identification of resources available to assist in meeting health care needs will improve  Outcome: Ongoing  Goal: Ability to verbalize follow-up procedures will improve  Description: Ability to verbalize follow-up procedures will improve  Outcome: Ongoing     Problem: Nutritional:  Goal: Nutritional status will improve  Description: Nutritional status will improve  Outcome: Ongoing     Problem: Physical Regulation:  Goal: Complications related to the disease process, condition or treatment will be avoided or minimized  Description:
Therapy evaluation completed. Please see daily notes and/or progress notes for details related to planned treatment interventions, goals and functional performance.
RN  Outcome: Ongoing  11/5/2020 0155 by Yoanna Spencer RN  Outcome: Ongoing     Problem: Health Behavior:  Goal: Adoption of positive health habits will improve  Description: Adoption of positive health habits will improve  11/5/2020 1209 by Jean Keller RN  Outcome: Ongoing  11/5/2020 0155 by Yoanna Spencer RN  Outcome: Ongoing  Goal: Identification of resources available to assist in meeting health care needs will improve  Description: Identification of resources available to assist in meeting health care needs will improve  11/5/2020 1209 by Jean Keller RN  Outcome: Ongoing  11/5/2020 0155 by Yoanna Spencer RN  Outcome: Ongoing  Goal: Ability to verbalize follow-up procedures will improve  Description: Ability to verbalize follow-up procedures will improve  11/5/2020 1209 by Jean Keller RN  Outcome: Ongoing  11/5/2020 0155 by Yoanna Spencer RN  Outcome: Ongoing     Problem: Nutritional:  Goal: Nutritional status will improve  Description: Nutritional status will improve  11/5/2020 1209 by Jean Keller RN  Outcome: Ongoing  11/5/2020 0155 by Yoanna Spencer RN  Outcome: Ongoing     Problem: Physical Regulation:  Goal: Complications related to the disease process, condition or treatment will be avoided or minimized  Description: Complications related to the disease process, condition or treatment will be avoided or minimized  11/5/2020 1209 by Jean Keller RN  Outcome: Ongoing  11/5/2020 0155 by Yoanna Spencer RN  Outcome: Ongoing     Problem: Safety:  Goal: Ability to remain free from injury will improve  Description: Ability to remain free from injury will improve  11/5/2020 1209 by Jean Keller RN  Outcome: Ongoing  11/5/2020 0155 by Yoanna Spencer RN  Outcome: Ongoing     Problem: Sensory:  Goal: Ability to develop a pain control plan will improve  Description: Ability to develop a pain control plan will improve  11/5/2020 1209 by Susana Alanis

## 2020-11-08 ENCOUNTER — APPOINTMENT (OUTPATIENT)
Dept: GENERAL RADIOLOGY | Age: 85
DRG: 330 | End: 2020-11-08
Attending: COLON & RECTAL SURGERY
Payer: MEDICARE

## 2020-11-08 LAB
BASOPHILS ABSOLUTE: 0 K/UL (ref 0–0.2)
BASOPHILS RELATIVE PERCENT: 0.2 %
EOSINOPHILS ABSOLUTE: 0.1 K/UL (ref 0–0.7)
EOSINOPHILS RELATIVE PERCENT: 2.5 %
HCT VFR BLD CALC: 24.8 % (ref 42–52)
HEMOGLOBIN: 8.4 G/DL (ref 14–18)
LYMPHOCYTES ABSOLUTE: 0.2 K/UL (ref 1–4.8)
LYMPHOCYTES RELATIVE PERCENT: 8.1 %
MCH RBC QN AUTO: 31 PG (ref 27–31.3)
MCHC RBC AUTO-ENTMCNC: 33.7 % (ref 33–37)
MCV RBC AUTO: 91.9 FL (ref 80–100)
MONOCYTES ABSOLUTE: 0.7 K/UL (ref 0.2–0.8)
MONOCYTES RELATIVE PERCENT: 26.3 %
NEUTROPHILS ABSOLUTE: 1.7 K/UL (ref 1.4–6.5)
NEUTROPHILS RELATIVE PERCENT: 62.9 %
PDW BLD-RTO: 14.4 % (ref 11.5–14.5)
PLATELET # BLD: 142 K/UL (ref 130–400)
RBC # BLD: 2.7 M/UL (ref 4.7–6.1)
WBC # BLD: 2.7 K/UL (ref 4.8–10.8)

## 2020-11-08 PROCEDURE — 94664 DEMO&/EVAL PT USE INHALER: CPT

## 2020-11-08 PROCEDURE — 2700000000 HC OXYGEN THERAPY PER DAY

## 2020-11-08 PROCEDURE — 1210000000 HC MED SURG R&B

## 2020-11-08 PROCEDURE — 71045 X-RAY EXAM CHEST 1 VIEW: CPT

## 2020-11-08 PROCEDURE — 6360000002 HC RX W HCPCS: Performed by: COLON & RECTAL SURGERY

## 2020-11-08 PROCEDURE — 6360000002 HC RX W HCPCS: Performed by: SURGERY

## 2020-11-08 PROCEDURE — 6360000002 HC RX W HCPCS: Performed by: INTERNAL MEDICINE

## 2020-11-08 PROCEDURE — 94761 N-INVAS EAR/PLS OXIMETRY MLT: CPT

## 2020-11-08 PROCEDURE — 2580000003 HC RX 258: Performed by: COLON & RECTAL SURGERY

## 2020-11-08 PROCEDURE — 6370000000 HC RX 637 (ALT 250 FOR IP): Performed by: COLON & RECTAL SURGERY

## 2020-11-08 PROCEDURE — 6370000000 HC RX 637 (ALT 250 FOR IP): Performed by: NURSE PRACTITIONER

## 2020-11-08 PROCEDURE — 99024 POSTOP FOLLOW-UP VISIT: CPT | Performed by: SURGERY

## 2020-11-08 PROCEDURE — 36415 COLL VENOUS BLD VENIPUNCTURE: CPT

## 2020-11-08 PROCEDURE — 85025 COMPLETE CBC W/AUTO DIFF WBC: CPT

## 2020-11-08 RX ORDER — IPRATROPIUM BROMIDE AND ALBUTEROL SULFATE 2.5; .5 MG/3ML; MG/3ML
1 SOLUTION RESPIRATORY (INHALATION) EVERY 4 HOURS PRN
Status: DISCONTINUED | OUTPATIENT
Start: 2020-11-08 | End: 2020-11-10 | Stop reason: HOSPADM

## 2020-11-08 RX ORDER — FUROSEMIDE 10 MG/ML
20 INJECTION INTRAMUSCULAR; INTRAVENOUS ONCE
Status: COMPLETED | OUTPATIENT
Start: 2020-11-08 | End: 2020-11-08

## 2020-11-08 RX ORDER — IPRATROPIUM BROMIDE AND ALBUTEROL SULFATE 2.5; .5 MG/3ML; MG/3ML
1 SOLUTION RESPIRATORY (INHALATION)
Status: DISCONTINUED | OUTPATIENT
Start: 2020-11-08 | End: 2020-11-08

## 2020-11-08 RX ADMIN — SODIUM CHLORIDE: 9 INJECTION, SOLUTION INTRAVENOUS at 02:11

## 2020-11-08 RX ADMIN — Medication 10 ML: at 19:53

## 2020-11-08 RX ADMIN — FUROSEMIDE 20 MG: 10 INJECTION, SOLUTION INTRAVENOUS at 10:03

## 2020-11-08 RX ADMIN — METOCLOPRAMIDE HYDROCHLORIDE 5 MG: 5 INJECTION INTRAMUSCULAR; INTRAVENOUS at 19:53

## 2020-11-08 RX ADMIN — ENOXAPARIN SODIUM 40 MG: 40 INJECTION SUBCUTANEOUS at 19:53

## 2020-11-08 RX ADMIN — LATANOPROST 1 DROP: 50 SOLUTION OPHTHALMIC at 19:56

## 2020-11-08 RX ADMIN — ATORVASTATIN CALCIUM 20 MG: 20 TABLET, FILM COATED ORAL at 19:53

## 2020-11-08 RX ADMIN — TAMSULOSIN HYDROCHLORIDE 0.4 MG: 0.4 CAPSULE ORAL at 07:39

## 2020-11-08 RX ADMIN — ONDANSETRON 4 MG: 2 INJECTION INTRAMUSCULAR; INTRAVENOUS at 03:38

## 2020-11-08 RX ADMIN — PANTOPRAZOLE SODIUM 40 MG: 40 TABLET, DELAYED RELEASE ORAL at 06:00

## 2020-11-08 RX ADMIN — Medication 10 ML: at 07:39

## 2020-11-08 ASSESSMENT — ENCOUNTER SYMPTOMS
COUGH: 1
SHORTNESS OF BREATH: 1
DIARRHEA: 0
VOMITING: 0
NAUSEA: 0

## 2020-11-08 ASSESSMENT — PAIN SCALES - GENERAL
PAINLEVEL_OUTOF10: 0

## 2020-11-08 NOTE — PROGRESS NOTES
Progress Note  Date:2020       Room:W473/W473-01  Patient Name:Luis Baker     YOB: 1934     Age:86 y.o. Pt was seen and evaluated, complains of SOB  Subjective    Subjective:  Symptoms:  He reports shortness of breath, cough and weakness. No malaise, chest pain, headache, chest pressure, anorexia, diarrhea or anxiety. Diet:  No nausea or vomiting. Review of Systems   Respiratory: Positive for cough and shortness of breath. Cardiovascular: Negative for chest pain. Gastrointestinal: Negative for anorexia, diarrhea, nausea and vomiting. Neurological: Positive for weakness. Objective         Vitals Last 24 Hours:  TEMPERATURE:  Temp  Av.5 °F (36.4 °C)  Min: 97.2 °F (36.2 °C)  Max: 98.1 °F (36.7 °C)  RESPIRATIONS RANGE: Resp  Av  Min: 18  Max: 18  PULSE OXIMETRY RANGE: SpO2  Av.2 %  Min: 96 %  Max: 100 %  PULSE RANGE: Pulse  Av.6  Min: 94  Max: 159  BLOOD PRESSURE RANGE: Systolic (09QXW), ZZU:433 , Min:89 , CZY:630   ; Diastolic (27QUH), NZE:48, Min:56, Max:97    I/O (24Hr): Intake/Output Summary (Last 24 hours) at 2020 1053  Last data filed at 2020 0600  Gross per 24 hour   Intake 3606 ml   Output 825 ml   Net 2781 ml     Objective:  General Appearance:  Comfortable, well-appearing and in no acute distress. Vital signs: (most recent): Blood pressure (!) 112/97, pulse 153, temperature 97.3 °F (36.3 °C), temperature source Oral, resp. rate 18, height 5' 9\" (1.753 m), weight 160 lb (72.6 kg), SpO2 100 %. HEENT: Normal HEENT exam.    Lungs:  Normal effort and normal respiratory rate. Breath sounds clear to auscultation. Heart: Normal rate. Abdomen: Abdomen is distended. Bowel sounds are normal.   There is no epigastric area or suprapubic area tenderness. Extremities: There is no deformity. Pulses: Distal pulses are intact. Neurological: Patient is alert. Pupils:  Pupils are equal, round, and reactive to light. Skin:  Warm and dry. Labs/Imaging/Diagnostics    Labs:  CBC:  Recent Labs     11/06/20  0816 11/07/20  0748 11/08/20  0612   WBC 5.6 1.9* 2.7*   RBC 2.79* 2.76* 2.70*   HGB 8.5* 8.4* 8.4*   HCT 25.9* 25.3* 24.8*   MCV 93.0 91.8 91.9   RDW 14.2 14.3 14.4    139 142     CHEMISTRIES:  Recent Labs     11/06/20  0816 11/07/20  0748    139   K 4.0 3.7    105   CO2 20 18*   BUN 26* 30*   CREATININE 1.83* 1.64*   GLUCOSE 110* 128*     PT/INR:No results for input(s): PROTIME, INR in the last 72 hours. APTT:No results for input(s): APTT in the last 72 hours. LIVER PROFILE:No results for input(s): AST, ALT, BILIDIR, BILITOT, ALKPHOS in the last 72 hours. Imaging Last 24 Hours:  No results found.   Assessment//Plan           Hospital Problems           Last Modified POA    Benign prostatic hyperplasia 11/4/2020 Yes    Overview Signed 10/14/2017  7:25 PM by [address] [facility]     Updating Deprecated Diagnoses         GERD (gastroesophageal reflux disease) 11/4/2020 Yes    Hyperlipidemia 11/4/2020 Yes    Atrial fibrillation ([address] 11/4/2020 Yes    Colon cancer ([facility] 11/4/2020 Yes    Glaucoma 11/4/2020 Yes    Cardiomyopathy ([facility] [address] [facility] 11/4/2020 Yes        Assessment & Plan    1) Colon cancer   S/p  Laparoscopic right colectomy with primary anastomosis  Still has bleeding  hgb is stable  FU surgery  2) GERD c/w prilosec  3) HLD  C/w statin  4) BPH  C/w flomax  5) afib c/w BB  Resume eliquis when ok with surgery  6) Cardiomyopahty with EF of 20 %   euvolemic    7) pt/ot for weakness  Electronically signed by A  8) HONEY on CKD renal eval  9) SOB, scatered rhonchi and wheez  duonebs  CXR  IV lasix x 1 dose  IS  Kasey Mora MD on 11/5/20 at 10:54 AM EST

## 2020-11-08 NOTE — PROGRESS NOTES
Nephrology Progress Note    Assessment:  CKD-3  S/P right colectomy cancer  Hx Hypertension  OHDX HF  Hx Bladder cancer   Left nephrectomy suspect d/t cancer      Plan: awaiting lab urine    Patient Active Problem List:     Hypertension     Benign prostatic hyperplasia     GERD (gastroesophageal reflux disease)     Osteoarthritis     Type 2 diabetes mellitus without complication (HCC)     Hyperlipidemia     Bladder cancer (HCC)     Sinus bradycardia on ECG     Left bundle branch block     Abnormal finding on EKG     Primary bladder malignant neoplasm (HCC)     Hypogonadism in male     Atrial fibrillation (HCC)     Abdominal pain     Mesenteric ischemia (HCC)     Epigastric abdominal pain     Nausea     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     Urothelial carcinoma (HCC)     Chronic kidney disease, stage III (moderate)     Congestive heart failure (HCC)     Colon cancer (HCC)     Glaucoma     Cardiomyopathy ([facility]      Subjective:  Admit Date: 11/4/2020    Interval History: doing about the same no issues    Medications:  Scheduled Meds:   ipratropium-albuterol  1 ampule Inhalation Q4H WA    furosemide  20 mg Intravenous Once    metoclopramide  5 mg Intravenous Q8H    pantoprazole  40 mg Oral QAM AC    atorvastatin  20 mg Oral Daily    tamsulosin  0.4 mg Oral Daily    sodium chloride flush  10 mL Intravenous 2 times per day    enoxaparin  40 mg Subcutaneous Daily    latanoprost  1 drop Both Eyes Nightly     Continuous Infusions:   sodium chloride 75 mL/hr at 11/08/20 0211       CBC:   Recent Labs     11/07/20  0748 11/08/20  0612   WBC 1.9* 2.7*   HGB 8.4* 8.4*    142     CMP:    Recent Labs     11/06/20  0816 11/07/20  0748    139   K 4.0 3.7    105   CO2 20 18*   BUN 26* 30*   CREATININE 1.83* 1.64*   GLUCOSE 110* 128*   CALCIUM 8.0* 8.1*   LABGLOM 35.2* 40.0*     Troponin: No results for input(s): TROPONINI in the last 72 hours.   BNP: No results for input(s): BNP in the last 72

## 2020-11-08 NOTE — PROGRESS NOTES
Disorder  (acute or chronic)  []   Severe or Chronic w/ Exacerbation  []     Surgical Status No []   Surgeries     General []   Surgery Lower [x]   Abdominal Thoracic or []   Upper Abdominal Thoracic with  PulmonaryDisorder  []     Chest X-ray Clear/Not  Ordered     []  Chronic Changes  Results Pending  []  Infiltrates, atelectasis, pleural effusion, or edema  [x]  Infiltrates in more than one lobe []  Infiltrate + Atelectasis, &/or pleural effusion  []    Respiratory Pattern Regular,  RR = 12-20 [x]  Increased,  RR = 21-25 []  [place], irregular,  or RR = 26-30 []  Decreased FEV1  or RR = 31-35 []  Severe SOB, use  of accessory muscles, or RR ? 35  []    Mental Status Alert, oriented,  Cooperative [x]  Confused but Follows commands []  Lethargic or unable to follow commands []  Obtunded  []  Comatose  []    Breath Sounds Clear to  auscultation  [x]  Decreased unilaterally or  in bases only []  Decreased  bilaterally  []  Crackles or intermittent wheezes []  Wheezes []    Cough Strong, Spontan., & nonproductive [x]  Strong,  spontaneous, &  productive []  Weak,  Nonproductive []  Weak, productive or  with wheezes []  No spontaneous  cough or may require suctioning []    Level of Activity Ambulatory []  Ambulatory w/ Assist  [x]  Non-ambulatory []  Paraplegic []  Quadriplegic []    Total    Score:____5___     Triage Score:_____5___      Tri       Triage:     1. (>20) Freq: Q3    2. (16-20) Freq: Q4   3. (11-15) Freq: QID & Albuterol Q2 PRN    4. (6-10) Freq: TID & Albuterol Q2 PRN    5. (0-5) Freq Q4prn

## 2020-11-08 NOTE — PROGRESS NOTES
Pt Name: [facility] Record Number: [id number]  Date of Birth 1934   Admit date 2020  9:39 AM  Today's Date: 2020     ASSESSMENT  1. Post op day # 4  2. Luis Rodriguez had Procedure(s):  LAPAROSCOPIC RIGHT COLECTOMY   3.  Doing well  4. Leukopenia better    PLAN  1. Continue same    SUBJECTIVE  Chief complaint: Nauseated  Afebrile, vital signs are stable. He denies any nausea or vomiting, has passed flatus and had multiple bowel movements. He is tolerating a DIET LOW FAT; Dysphagia Pureed. His pain is well controlled on current medications. has a past medical history of Abnormal finding on EKG, Atrial fibrillation ([address] BPH (benign prostatic hypertrophy), Cancer ([facility] Colon polyps, Congestive heart failure ([facility] [address] [facility] GERD (gastroesophageal reflux disease), Hyperlipidemia, Hypertension, Left bundle branch block, Osteoarthritis, and Sinus bradycardia on ECG. CURRENT MEDS  Scheduled Meds:   metoclopramide  5 mg Intravenous Q8H    pantoprazole  40 mg Oral QAM AC    atorvastatin  20 mg Oral Daily    tamsulosin  0.4 mg Oral Daily    sodium chloride flush  10 mL Intravenous 2 times per day    enoxaparin  40 mg Subcutaneous Daily    latanoprost  1 drop Both Eyes Nightly     Continuous Infusions:   sodium chloride 75 mL/hr at 20 0211     PRN Meds:.ipratropium-albuterol, HYDROcodone 5 mg - acetaminophen, acetaminophen, sodium chloride flush, promethazine **OR** ondansetron    OBJECTIVE  CURRENT VITALS:  height is 5' 9\" (1.753 m) and weight is 160 lb (72.6 kg). His oral temperature is 97.3 °F (36.3 °C). His blood pressure is 112/97 (abnormal) and his pulse is 100. His respiration is 18 and oxygen saturation is 100%.    Temperature Range (24h):Temp: 97.3 °F (36.3 °C) Temp  Av.5 °F (36.4 °C)  Min: 97.3 °F (36.3 °C)  Max: 98.1 °F (36.7 °C)  BP Range (36K): Systolic (49FZA), RNE:165 , Min:89 , HNK:827     Diastolic (95ZVV), KAU:22, Min:56, Max:97    Pulse Range (24h): Pulse  Avg: 127.2  Min: 94  Max: 159  Respiration Range (24h): Resp  Av  Min: 18  Max: 18    GENERAL: alert, no distress, con fused  LUNGS: clear to ausculation, without wheezes, rales or rhonci  HEART: normal rate and regular rhythm  ABDOMEN: distended, soft, incisional tenderness, bowel sounds present in all 4 quadrants and no guarding or peritoneal signs  INCISION: healing well, no significant drainage, no significant erythema  EXTERMITY: no cyanosis, clubbing or edema    In: 3606 [P.O.:480; I.V.:3126]  Out: 825 [Urine:825]  Date 20 0000 - 20 2359   Shift 6186-7523 5067-1016 5720-3771 24 Hour Total   INTAKE   P.O.(mL/kg/hr) 240(0.4)   240   I. V.(mL/kg) [id number])   895(90.5)   Shift Total(mL/kg) 3627(70.4)   9838(52.6)   OUTPUT   Urine(mL/kg/hr) 375(0.6)   375   Shift Total(mL/kg) 375(5.2)   375(5.2)   Weight (kg) 72.6 72.6 72.6 72.6       LABS  Recent Labs     20  0816 20  0748 20  0612   WBC 5.6 1.9* 2.7*   HGB 8.5* 8.4* 8.4*   HCT 25.9* 25.3* 24.8*    139 142    139  --    K 4.0 3.7  --     105  --    CO2 20 18*  --    BUN 26* 30*  --    CREATININE 1.83* 1.64*  --    CALCIUM 8.0* 8.1*  --       No results for input(s): PTT, INR in the last 72 hours. Invalid input(s): PT  No results for input(s): AST, ALT, BILITOT, BILIDIR, AMYLASE, LIPASE, LDH, LACTA in the last 72 hours. RADIOLOGY  Ct Abdomen Pelvis Wo Contrast Additional Contrast? Radiologist Recommendation    Result Date: 10/19/2020  EXAM: CT ABDOMEN PELVIS WO CONTRAST CLINICAL:R10.30 Lower abdominal pain ICD10 TECHNIQUE: CT scans of the abdomen and pelvis were obtained without contrast as ordered All CT scans at this facility use dose modulation, iterative reconstruction, and/or weight based dosing when appropriate to reduce radiation dose to as low as reasonably achievable. FINDINGS: Some nonspecific reticular markings at the lung bases noted. Right-sided heart electrodes in place. .       No evidence of nonenhancing pathology in the liver or spleen. No suspicious lesions in the pancreas on this unenhanced study. Gallbladder and biliary tree unremarkable. There is gas and undigested debris within the stomach and there is gas in the small hiatal hernia. No inflammation in the epigastric soft tissues. Indwelling ureteral stent on the right side extends from the midpole the right kidney to the urinary bladder in satisfactory position. No hydronephrosis in the right kidney or evidence of renal stone disease. Left kidney is surgically absent. Atherosclerotic changes in the aorta noted but no evidence of aneurysm. There is stent material in the SMA proximally. There is calcification in the renal arteries bilaterally. No retroperitoneal mass or fluid collection. A few fluid filled small bowel loops in the left abdomen with no bowel wall edema or small bowel dilatation. No evidence of small bowel ischemia. The appendix is normal. There is gas and stool throughout the colon. There are diverticula in the colon with the majority located in the redundant sigmoid colon. No evidence of acute diverticulitis. No evidence of colonic obstruction. Anterior abdominal wall intact. The pelvic viscera are intact. No mass or inflammatory changes seen in the pelvis. No significant adenopathy along the pelvic sidewall. Bony structures intact. Degenerative changes overlie the lower dorsal spine and are fairly severe the lower lumbar spine at L5-S1. No compression fracture or infiltrative bony process. Mild degenerative hip disease. IMPRESSION: NO ACUTE PROCESS IN THE ABDOMEN OR PELVIS. Xr Chest Portable    Result Date: 11/8/2020  Exam: XR CHEST PORTABLE History:  SOB Technique: AP portable view of the chest obtained. Comparison: Chest x-ray from October 31, 2018 Findings: There is a left AICD device in place The cardiac silhouette is at the upper limits of normal in size.  There is mild elevation of the right hemidiaphragm with atelectasis versus early infiltrate in the left lung base. Bones of the thorax appear intact. There is mild atelectasis versus infiltrate in left lung base. Apparent elevation of the right hemidiaphragm may be due to a small pleural effusion or subpulmonic effusion.      Electronically signed by Kelsy Ivy MD on 11/8/2020 at 2:11 PM

## 2020-11-08 NOTE — PROGRESS NOTES
Pt resting in bed no distress. Up to the bathroom with 1 assist. Bal draining yellow urine. abd rounded. Hypoactive BS. Incision staples intact and open to air. Evalina Sour in place. Using incentive spirometer. scds on. Fall precautions in place. avaysis camera in room for safety. Hourly rounds continue. Call light in reach.

## 2020-11-09 LAB
ALBUMIN SERPL-MCNC: 3.1 G/DL (ref 3.5–4.6)
ALP BLD-CCNC: 51 U/L (ref 35–104)
ALT SERPL-CCNC: 15 U/L (ref 0–41)
ANION GAP SERPL CALCULATED.3IONS-SCNC: 10 MEQ/L (ref 9–15)
AST SERPL-CCNC: 15 U/L (ref 0–40)
BASOPHILS ABSOLUTE: 0 K/UL (ref 0–0.2)
BASOPHILS RELATIVE PERCENT: 0.1 %
BILIRUB SERPL-MCNC: 0.3 MG/DL (ref 0.2–0.7)
BUN BLDV-MCNC: 24 MG/DL (ref 8–23)
CALCIUM SERPL-MCNC: 7.9 MG/DL (ref 8.5–9.9)
CHLORIDE BLD-SCNC: 106 MEQ/L (ref 95–107)
CO2: 19 MEQ/L (ref 20–31)
CREAT SERPL-MCNC: 1.35 MG/DL (ref 0.7–1.2)
EOSINOPHILS ABSOLUTE: 0.1 K/UL (ref 0–0.7)
EOSINOPHILS RELATIVE PERCENT: 2.8 %
GFR AFRICAN AMERICAN: >60
GFR NON-AFRICAN AMERICAN: 50.1
GLOBULIN: 2.4 G/DL (ref 2.3–3.5)
GLUCOSE BLD-MCNC: 140 MG/DL (ref 70–99)
HCT VFR BLD CALC: 24 % (ref 42–52)
HEMOGLOBIN: 8.2 G/DL (ref 14–18)
LYMPHOCYTES ABSOLUTE: 0.5 K/UL (ref 1–4.8)
LYMPHOCYTES RELATIVE PERCENT: 11.5 %
MCH RBC QN AUTO: 31.1 PG (ref 27–31.3)
MCHC RBC AUTO-ENTMCNC: 33.9 % (ref 33–37)
MCV RBC AUTO: 91.5 FL (ref 80–100)
MONOCYTES ABSOLUTE: 0.8 K/UL (ref 0.2–0.8)
MONOCYTES RELATIVE PERCENT: 17.5 %
NEUTROPHILS ABSOLUTE: 3.1 K/UL (ref 1.4–6.5)
NEUTROPHILS RELATIVE PERCENT: 68.1 %
PDW BLD-RTO: 14.2 % (ref 11.5–14.5)
PLATELET # BLD: 163 K/UL (ref 130–400)
POTASSIUM SERPL-SCNC: 3.2 MEQ/L (ref 3.4–4.9)
RBC # BLD: 2.63 M/UL (ref 4.7–6.1)
SODIUM BLD-SCNC: 135 MEQ/L (ref 135–144)
TOTAL PROTEIN: 5.5 G/DL (ref 6.3–8)
WBC # BLD: 4.5 K/UL (ref 4.8–10.8)

## 2020-11-09 PROCEDURE — 92526 ORAL FUNCTION THERAPY: CPT

## 2020-11-09 PROCEDURE — 97535 SELF CARE MNGMENT TRAINING: CPT

## 2020-11-09 PROCEDURE — 2700000000 HC OXYGEN THERAPY PER DAY

## 2020-11-09 PROCEDURE — 6360000002 HC RX W HCPCS: Performed by: SURGERY

## 2020-11-09 PROCEDURE — 85025 COMPLETE CBC W/AUTO DIFF WBC: CPT

## 2020-11-09 PROCEDURE — 6370000000 HC RX 637 (ALT 250 FOR IP): Performed by: NURSE PRACTITIONER

## 2020-11-09 PROCEDURE — 2580000003 HC RX 258: Performed by: COLON & RECTAL SURGERY

## 2020-11-09 PROCEDURE — 80053 COMPREHEN METABOLIC PANEL: CPT

## 2020-11-09 PROCEDURE — 1210000000 HC MED SURG R&B

## 2020-11-09 PROCEDURE — 36415 COLL VENOUS BLD VENIPUNCTURE: CPT

## 2020-11-09 PROCEDURE — 6370000000 HC RX 637 (ALT 250 FOR IP): Performed by: COLON & RECTAL SURGERY

## 2020-11-09 PROCEDURE — 6370000000 HC RX 637 (ALT 250 FOR IP): Performed by: INTERNAL MEDICINE

## 2020-11-09 PROCEDURE — 99024 POSTOP FOLLOW-UP VISIT: CPT | Performed by: COLON & RECTAL SURGERY

## 2020-11-09 PROCEDURE — 6360000002 HC RX W HCPCS: Performed by: COLON & RECTAL SURGERY

## 2020-11-09 RX ORDER — POTASSIUM CHLORIDE 750 MG/1
20 CAPSULE, EXTENDED RELEASE ORAL ONCE
Status: COMPLETED | OUTPATIENT
Start: 2020-11-09 | End: 2020-11-09

## 2020-11-09 RX ADMIN — ATORVASTATIN CALCIUM 20 MG: 20 TABLET, FILM COATED ORAL at 20:14

## 2020-11-09 RX ADMIN — Medication 10 ML: at 20:14

## 2020-11-09 RX ADMIN — ENOXAPARIN SODIUM 40 MG: 40 INJECTION SUBCUTANEOUS at 20:14

## 2020-11-09 RX ADMIN — METOCLOPRAMIDE HYDROCHLORIDE 5 MG: 5 INJECTION INTRAMUSCULAR; INTRAVENOUS at 20:14

## 2020-11-09 RX ADMIN — LATANOPROST 1 DROP: 50 SOLUTION OPHTHALMIC at 20:15

## 2020-11-09 RX ADMIN — PANTOPRAZOLE SODIUM 40 MG: 40 TABLET, DELAYED RELEASE ORAL at 06:00

## 2020-11-09 RX ADMIN — METOCLOPRAMIDE HYDROCHLORIDE 5 MG: 5 INJECTION INTRAMUSCULAR; INTRAVENOUS at 03:38

## 2020-11-09 RX ADMIN — TAMSULOSIN HYDROCHLORIDE 0.4 MG: 0.4 CAPSULE ORAL at 10:36

## 2020-11-09 RX ADMIN — POTASSIUM CHLORIDE 20 MEQ: 750 CAPSULE, EXTENDED RELEASE ORAL at 10:32

## 2020-11-09 RX ADMIN — SODIUM CHLORIDE: 9 INJECTION, SOLUTION INTRAVENOUS at 03:39

## 2020-11-09 ASSESSMENT — PAIN SCALES - GENERAL
PAINLEVEL_OUTOF10: 0

## 2020-11-09 ASSESSMENT — ENCOUNTER SYMPTOMS
COUGH: 0
DIARRHEA: 0
SHORTNESS OF BREATH: 0
NAUSEA: 0
VOMITING: 0

## 2020-11-09 NOTE — PROGRESS NOTES
Assessment completed. Patient denies any SOB, N/V, dizziness or pain at this time. Is resting comfortably in bed. Avasys is still in place for patient's safety. Midline incision is clean, dry and intact and open to air. Denies any needs at this time. Call light is within reach.   Electronically signed by Castro Ram RN on 11/8/2020 at 11:41 PM

## 2020-11-09 NOTE — ADT AUTH CERT
Bowel Surgery: Colectomy, Partial, with or without Ostomy, by Laparoscopy - Care Day 5 (11/8/2020) by Esau Stevenson RN         Review Status  Review Entered    Completed  11/9/2020 14:36        Criteria Review       Care Day: 5 Care Date: 11/8/2020 Level of Care: Inpatient Floor    Guideline Day 3    Level Of Care    (X) Floor    11/9/2020 2:36 PM EST by Consuelo Mesa      inpt floor    Clinical Status    (X) * No evidence of ileus or bowel obstruction    11/9/2020 2:36 PM EST by Consuelo Mesa      temp 97.3, pulse 101, resp 18, bp 112/97, pulse ox 99 % on 2l nc    labs  wbc 2.7  rbc 2.70  h/h 8.4/ 24.8  lymph 0.2    Activity    (X) * Ambulatory    11/9/2020 2:36 PM EST by Consuelo Mesa      up with assist  ambulate patient    Routes    (X) * Liquid or usual diet, advance as tolerated    11/9/2020 2:36 PM EST by Consuelo Mesa      diet- low fat. pureed    (X) * Oral medications    11/9/2020 2:36 PM EST by Consuelo Mesa      lovenox 40 mg sc daily   iv lasix 20 mg iv x1 this day   iv reglan 5 mg iv q8h    (X) Possible IV fluids, medications    11/9/2020 2:36 PM EST by Consuelo Mesa      iv ns at 75 cc/h  prn- iv zofran 4 mg iv x1    Interventions    (X) Enterostomy therapy    11/9/2020 2:36 PM EST by Consuelo Mesa      cxr  There is mild atelectasis versus infiltrate in left lung base. Apparent elevation of the right hemidiaphragm may be due to a small pleural effusion or subpulmonic effusion.     Medications    (X) * Epidural analgesics absent    * Milestone    Additional Notes    11/8/2020  care day 5       Physical Exam    ABDOMEN: distended, soft, incisional tenderness, bowel sounds present in all 4 quadrants and no guarding or peritoneal signs    INCISION: healing well, no significant drainage, no significant erythema       Meds- names, dose, route, rate    Scheduled Meds:    metoclopramide, 5 mg, Intravenous, Q8H    pantoprazole, 40 mg, Oral, QAM AC    atorvastatin, 20 mg, Oral, Daily    tamsulosin, 0.4 mg, Oral, Daily    enoxaparin, 40 mg, Subcutaneous, Daily    latanoprost, 1 drop, Both Eyes, Nightly             Treatment plan attending/ consults    Surgical impression     ASSESSMENT    1. Post op day # 4    2. Jasmin Egnlish had Procedure(s):    LAPAROSCOPIC RIGHT COLECTOMY    3.  Doing well    4.  Leukopenia better         PLAN    1.  Continue same         SUBJECTIVE    Chief complaint: Nauseated    Afebrile, vital signs are stable. He denies any nausea or vomiting, has passed flatus and had multiple bowel movements. He is tolerating a DIET LOW FAT; Dysphagia Pureed.  His pain is well controlled on current medications          Renal impression    Assessment:    CKD-3    S/P right colectomy cancer    Hx Hypertension    OHDX HF    Hx Bladder cancer    Left nephrectomy              Plan: awaiting lab urine       Medical impression         1) Colon cancer   S/p  Laparoscopic right colectomy with primary anastomosis    Still has bleeding    hgb is stable    FU surgery    2) GERD c/w prilosec    3) HLD    C/w statin    4) BPH    C/w flomax    5) afib c/w BB    Resume eliquis when ok with surgery    6) Cardiomyopahty with EF of 20 %    euvolemic      7) pt/ot for weakness    Electronically signed by A    8) HONEY on CKD renal eval    9) SOB, scatered rhonchi and wheez    duonebs    CXR    IV lasix x 1 dose    IS

## 2020-11-09 NOTE — PROGRESS NOTES
Physician Progress Note      Armen Petersen  CSN #:                  [id number]  :                       1934  ADMIT DATE:       2020 9:39 AM  DISCH DATE:  RESPONDING  PROVIDER #:        Abdulkadir Wharton MD          QUERY TEXT:    Dear Attending:    Patient admitted with Colon cancer S/p  Laparoscopic right colectomy, noted to   have atrial fibrillation. If possible, please document in progress notes and   discharge summary further specificity regarding the type of atrial   fibrillation: The medical record reflects the following:  Risk Factors:  hx of a fib chf htn  Clinical Indicators: per hospitalist consult 20: \"Afib  remains in NSR     Normal rate 70s. Does take betablocker,  On eliquis currently on hold d/t   surgery. Plan  Resume eliquis when ok w surgical service. \"  Treatment: Eliquis on hold, resume when ok w/ surgical service    Chronic: nonspecific term that could be referring to paroxysmal, persistent,   or permanent  Longstanding persistent: persistent and continuous, lasting > 1 year. Paroxysmal - self-terminating or intermittent; resolves with or without   intervention within 7 days of onset; may recur with various frequency. Persistent - Fails to terminate within 7 days; Often requires meds or   cardioversion to restore to NSR. Permanent - longstanding & persistent; Medication has been ineffective in   restoring NSR &/or cardioversion is contraindicated    Definitions per [employer] Training Guide and Quick Reference Guide, Deidre Gal[address] 5   Diseases and Disorders of the Circulatory System; 2019; [employer]. Software content   from the [employer]? Advanced [employer] Transformation Program    Thank you,  Jazmine DAWSONN RN CDS  contact Nishant Renteria  w/ any questions or  [email or web address]  Options provided:  -- Paroxysmal Atrial Fibrillation  -- Longstanding Persistent Atrial Fibrillation  -- Permanent Atrial Fibrillation  -- Persistent Atrial Fibrillation  -- Chronic Atrial Fibrillation, unspecified  -- Other - I will add my own diagnosis  -- Disagree - Not applicable / Not valid  -- Disagree - Clinically unable to determine / Unknown  -- Refer to Clinical Documentation Reviewer    PROVIDER RESPONSE TEXT:    This patient has paroxysmal atrial fibrillation.     Query created by: Jasiel Lee on 11/9/2020 11:51 AM      Electronically signed by:  Jonathan Snowden MD 11/9/2020 5:18 PM

## 2020-11-09 NOTE — DISCHARGE INSTR - COC
Continuity of Care Form    Patient Name: Yunier Medina   :  1934  MRN:  [id number]    Admit date:  2020  Discharge date:  11/10/2020    Code Status Order: Full Code   Advance Directives:      Admitting Physician:  Vanesa Lopez MD  PCP: Loki Murillo MD    Discharging Nurse: Lianet Hernandez RN  [address] Unit/Room#: Q115/B935-56  Discharging Unit Phone Number: [phone]    Emergency Contact:   Extended Emergency Contact Information  Primary Emergency Contact: Adonay Bhakta [address] Phone: [phone]  Work Phone: [phone]  Mobile Phone: [phone]  Relation: Other  Secondary Emergency Contact:  Christian Ashley [address] Phone: [phone]  Mobile Phone: [phone]  Relation: Other    Past Surgical History:  Past Surgical History:   Procedure Laterality Date    ANGIOPLASTY  2018    percutaneous transluminal angioplasty of the SMA with stent implantation, [address] COLONOSCOPY  2016    COLONOSCOPY N/A 10/20/2020    COLONOSCOPY DIAGNOSTIC performed by Candace Uriarte MD at [place] [address]  2019    DR Aida Montanez 35 N/A 2020    LAPAROSCOPIC RIGHT COLECTOMY performed by Vanesa Lopez MD at [address] [place] [address] ESOPHAGOGASTRODUODENOSCOPY TRANSORAL DIAGNOSTIC N/A 2018    EGD ESOPHAGOGASTRODUODENOSCOPY performed by Candace Uriarte MD at [place] [address]  2016    left kidney, Dr. Miner Levels N/A 10/20/2020    EGD ESOPHAGOGASTRODUODENOSCOPY performed by Candace Uriarte MD at [place] [address]  2019       Immunization History:   Immunization History   Administered Date(s) Administered    DT (pediatric) 2012    Influenza, High Dose (Fluzone 65 yrs and older) 09/15/2016, 2017, 2018    Pneumococcal Conjugate 13-valent ([id number]) 04/14/2016    Pneumococcal Polysaccharide ([id number]) 04/09/2013       Active Problems:  Patient Active Problem List   Diagnosis Code    Hypertension I10    Benign prostatic hyperplasia N40.0    GERD (gastroesophageal reflux disease) K21.9    Osteoarthritis M19.90    Type 2 diabetes mellitus without complication (HCC) J41.4    Hyperlipidemia E78.5    Bladder cancer (HCC) C67.9    Sinus bradycardia on ECG R00.1    Left bundle branch block I44.7    Abnormal finding on EKG R94.31    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    Hypogonadism in male E29.1    Atrial fibrillation (HCC) I48.91    Abdominal pain R10.9    Mesenteric ischemia (HCC) K55.9    Epigastric abdominal pain R10.13    Nausea R11.0    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 K29.40    Urothelial carcinoma (HCC) C68.9    Chronic kidney disease, stage III (moderate) N18.30    Congestive heart failure (HCC) I50.9    Colon cancer (HCC) C18.9    Glaucoma H40.9    Cardiomyopathy (HCC) I42.9       Isolation/Infection:   Isolation            No Isolation          Patient Infection Status       None to display            Nurse Assessment:  Last Vital Signs: BP (!) 119/95   Pulse (!) 30   Temp 97.5 °F (36.4 °C) (Oral)   Resp 20   Ht 5' 9\" (1.753 m)   Wt 160 lb (72.6 kg)   SpO2 97%   BMI 23.63 kg/m²     Last documented pain score (0-10 scale): Pain Level: 0  Last Weight:   Wt Readings from Last 1 Encounters:   11/04/20 160 lb (72.6 kg)     Mental Status:  oriented and alert    IV Access:  - None    Nursing Mobility/ADLs:  Walking   Assisted  Transfer  Assisted  Bathing  Assisted  Dressing  Assisted  Toileting  assisted    Feeding  Independent  Med Admin  Assisted  Med Delivery   whole    Wound Care Documentation and Therapy:        Elimination:  Continence:   · Bowel:  Yes  · Bladder: Yes  Urinary Catheter: None   Colostomy/Ileostomy/Ileal Conduit: No       Date of Last BM: Clydell Hockey  is necessary for the continuing treatment of the diagnosis listed and that he requires [place] for less 30 days.      Update Admission H&P: No change in H&P    PHYSICIAN SIGNATURE:  Electronically signed by Simon Red MD on 11/10/20 at 3:30 PM EST

## 2020-11-09 NOTE — PROGRESS NOTES
Nephrology Progress Note    Assessment:  CKD-3  S/P right colectomy cancer  OHDx HF  Hx Hypertension  Hx Left nephrectomy reason suspect cancer  Hx bladder cancer      Plan:if renal function stable may discharge  Follow in office 1-2 mariajose    Patient Active Problem List:     Hypertension     Benign prostatic hyperplasia     GERD (gastroesophageal reflux disease)     Osteoarthritis     Type 2 diabetes mellitus without complication (HCC)     Hyperlipidemia     Bladder cancer (HCC)     Sinus bradycardia on ECG     Left bundle branch block     Abnormal finding on EKG     Primary bladder malignant neoplasm (HCC)     Hypogonadism in male     Atrial fibrillation (HCC)     Abdominal pain     Mesenteric ischemia (HCC)     Epigastric abdominal pain     Nausea     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     Urothelial carcinoma (HCC)     Chronic kidney disease, stage III (moderate)     Congestive heart failure (HCC)     Colon cancer (HCC)     Glaucoma     [facility]      Subjective:  Admit Date: 11/4/2020    Interval History: no issues    Medications:  Scheduled Meds:   metoclopramide  5 mg Intravenous Q8H    pantoprazole  40 mg Oral QAM AC    atorvastatin  20 mg Oral Daily    tamsulosin  0.4 mg Oral Daily    sodium chloride flush  10 mL Intravenous 2 times per day    enoxaparin  40 mg Subcutaneous Daily    latanoprost  1 drop Both Eyes Nightly     Continuous Infusions:   sodium chloride 75 mL/hr at 11/09/20 0339       CBC:   Recent Labs     11/07/20  0748 11/08/20  0612   WBC 1.9* 2.7*   HGB 8.4* 8.4*    142     CMP:    Recent Labs     11/06/20  0816 11/07/20  0748    139   K 4.0 3.7    105   CO2 20 18*   BUN 26* 30*   CREATININE 1.83* 1.64*   GLUCOSE 110* 128*   CALCIUM 8.0* 8.1*   LABGLOM 35.2* 40.0*     Troponin: No results for input(s): TROPONINI in the last 72 hours. BNP: No results for input(s): BNP in the last 72 hours. INR: No results for input(s): INR in the last 72 hours.   Lipids: No results for input(s): CHOL, LDLDIRECT, TRIG, HDL, AMYLASE, LIPASE in the last 72 hours. Liver: No results for input(s): AST, ALT, ALKPHOS, PROT, LABALBU, BILITOT in the last 72 hours. Invalid input(s): BILDIR  Iron:  No results for input(s): IRONS, FERRITIN in the last 72 hours. Invalid input(s): LABIRONS  Urinalysis: No results for input(s): UA in the last 72 hours.     Objective:  Vitals: /69   Pulse 76   Temp 97.3 °F (36.3 °C) (Oral)   Resp 20   Ht 5' 9\" (1.753 m)   Wt 160 lb (72.6 kg)   SpO2 100%   BMI 23.63 kg/m²    Wt Readings from Last 3 Encounters:   11/04/20 160 lb (72.6 kg)   10/29/20 160 lb 6.4 oz (72.8 kg)   10/27/20 160 lb (72.6 kg)      24HR INTAKE/OUTPUT:      Intake/Output Summary (Last 24 hours) at 11/9/2020 0745  Last data filed at 11/9/2020 0602  Gross per 24 hour   Intake 2999 ml   Output 1800 ml   Net 1199 ml       General: alert, in no apparent distress  HEENT: normocephalic, atraumatic, anicteric  Neck: supple, no mass  Lungs: non-labored respirations, clear to auscultation bilaterally  Heart: regular rate and rhythm, no murmurs or rubs  Abdomen: soft, non-tender, non-distended  Ext: no cyanosis, no peripheral edema  Neuro: alert and oriented, no gross abnormalities  Psych: normal mood and affect  Skin: no rash      Electronically signed by Ken Villegas MD

## 2020-11-09 NOTE — PROGRESS NOTES
Physical Therapy Missed Treatment   Facility/Department: [facility] MED SURG [id number]    NAME: Saturnino Wood    : 1934 (80 y.o.)  MRN: [id number]    Account: [de-identified]  Gender: male    Chart reviewed, attempted PT at 10:40. Patient unavailable 2° to:        [x] Pt declined stating \"I don't feel like doing anything today and I need to be cleaned up. I went to the bathroom. \"       [x] Other notified that pt needed to be cleaned up. Will attempt PT treatment again at earliest convenience.       Electronically signed by Kriss Segura PTA on 20 at 10:50 AM EST

## 2020-11-09 NOTE — PROGRESS NOTES
Progress Note  Date:2020       [facility]:[id number]  Patient Name:Luis Delgado     YOB: 1934     Age:86 y.o. Pt was seen and evaluated, SOB is better compare to before. Subjective    Subjective:  Symptoms:  He reports weakness. No shortness of breath, malaise, cough, chest pain, headache, chest pressure, anorexia, diarrhea or anxiety. Diet:  No nausea or vomiting. Review of Systems   Respiratory: Negative for cough and shortness of breath. Cardiovascular: Negative for chest pain. Gastrointestinal: Negative for anorexia, diarrhea, nausea and vomiting. Neurological: Positive for weakness. Objective         Vitals Last 24 Hours:  TEMPERATURE:  Temp  Av.6 °F (36.4 °C)  Min: 97.3 °F (36.3 °C)  Max: 98.1 °F (36.7 °C)  RESPIRATIONS RANGE: Resp  Av  Min: 18  Max: 20  PULSE OXIMETRY RANGE: SpO2  Av.8 %  Min: 99 %  Max: 100 %  PULSE RANGE: Pulse  Av  Min: 76  Max: 101  BLOOD PRESSURE RANGE: Systolic (11XAW), YPD:452 , Min:106 , WMH:792   ; Diastolic (36KZR), DUN:85, Min:65, Max:69    I/O (24Hr): Intake/Output Summary (Last 24 hours) at 2020 1132  Last data filed at 2020 0602  Gross per 24 hour   Intake 2999 ml   Output 1800 ml   Net 1199 ml     Objective:  General Appearance:  Comfortable, well-appearing and in no acute distress. Vital signs: (most recent): Blood pressure 106/69, pulse 76, temperature 97.3 °F (36.3 °C), temperature source Oral, resp. rate 20, height 5' 9\" (1.753 m), weight 160 lb (72.6 kg), SpO2 100 %. HEENT: Normal HEENT exam.    Lungs:  Normal effort and normal respiratory rate. Breath sounds clear to auscultation. Heart: Normal rate. Abdomen: Abdomen is distended. Bowel sounds are normal.   There is no epigastric area or suprapubic area tenderness. Extremities: There is no deformity. Pulses: Distal pulses are intact. Neurological: Patient is alert.     Pupils:  Pupils are equal, round, and reactive to

## 2020-11-09 NOTE — PROGRESS NOTES
Pt Name: [facility] Record Number: [id number]  Date of Birth 1934   Admit date 2020  9:39 AM  Today's Date: 2020     ASSESSMENT  1. Hospital day # 5  2 postop day #5 right colectomy for cancer    PLAN  1.  DC to skilled nursing facility tomorrow after precertification    SUBJECTIVE  Chief complaint: Follow-up colectomy  Afebrile, vital signs are stable. He denies any nausea or vomiting, bowels are working He is tolerating a DIET LOW FAT; Dysphagia Pureed. His pain is well controlled on current medications. He has been ambulating in the halls. has a past medical history of Abnormal finding on EKG, Atrial fibrillation ([address] BPH (benign prostatic hypertrophy), Cancer ([facility] Colon polyps, Congestive heart failure ([facility] GERD (gastroesophageal reflux disease), Hyperlipidemia, Hypertension, Left bundle branch block, Osteoarthritis, and Sinus bradycardia on ECG. CURRENT MEDS  Scheduled Meds:   metoclopramide  5 mg Intravenous Q8H    pantoprazole  40 mg Oral QAM AC    atorvastatin  20 mg Oral Daily    tamsulosin  0.4 mg Oral Daily    sodium chloride flush  10 mL Intravenous 2 times per day    enoxaparin  40 mg Subcutaneous Daily    latanoprost  1 drop Both Eyes Nightly     Continuous Infusions:  PRN Meds:.ipratropium-albuterol, HYDROcodone 5 mg - acetaminophen, acetaminophen, sodium chloride flush, promethazine **OR** ondansetron    OBJECTIVE  CURRENT VITALS:  height is 5' 9\" (1.753 m) and weight is 160 lb (72.6 kg). His oral temperature is 97.5 °F (36.4 °C). His blood pressure is 119/95 (abnormal) and his pulse is 30 (abnormal). His respiration is 20 and oxygen saturation is 97%.    Temperature Range (24h):Temp: 97.5 °F (36.4 °C) Temp  Av.6 °F (36.4 °C)  Min: 97.3 °F (36.3 °C)  Max: 98.1 °F (36.7 °C)  BP Range (63X): Systolic (73WQB), KOI:595 , Min:106 , ALYSHA:973     Diastolic (14OWI), ZTX:52, Min:65, Max:95    Pulse Range (24h): Pulse  Av.5  Min: 30  Max: 101  Respiration Range (24h): Resp  Av.5  Min: 18  Max: 20    GENERAL: alert, no distress  LUNGS: clear to ausculation, without wheezes, rales or rhonci  HEART: normal rate and regular rhythm  ABDOMEN: soft, non-tender, non-distended, bowel sounds present in all 4 quadrants and no guarding or peritoneal signs  EXTERMITY: no cyanosis, clubbing or edema  In: 2999 [P.O.:240; I.V.:2759]  Out: 500 [Urine:500]  Date 20 0000 - 20 2359   Shift 2697-6351 8870-3791 4641-9351 24 Hour Total   INTAKE   P.O.(mL/kg/hr) 240(0.4)   240   I. V.(mL/kg) [id number])   [id number])   Shift Total(mL/kg) 3877(27.3)   3773(36.1)   OUTPUT   Urine(mL/kg/hr) 500(0.9)   500   Emesis/NG output(mL/kg) 0(0)   0(0)   Other(mL/kg) 0(0)   0(0)   Stool(mL/kg) 0(0)   0(0)   Blood(mL/kg) 0(0)   0(0)   Shift Total(mL/kg) 500(6.9)   500(6.9)   Weight (kg) 72.6 72.6 72.6 72.6       LABS  Recent Labs     20  0748 20  0612 20  0701   WBC 1.9* 2.7* 4.5*   HGB 8.4* 8.4* 8.2*   HCT 25.3* 24.8* 24.0*    142 163     --  135   K 3.7  --  3.2*     --  106   CO2 18*  --  19*   BUN 30*  --  24*   CREATININE 1.64*  --  1.35*   CALCIUM 8.1*  --  7.9*      No results for input(s): PTT, INR in the last 72 hours. Invalid input(s): PT  Recent Labs     20  0701   AST 15   ALT 15   BILITOT 0.3       RADIOLOGY  Ct Abdomen Pelvis Wo Contrast Additional Contrast? Radiologist Recommendation    Result Date: 10/19/2020  EXAM: CT ABDOMEN PELVIS WO CONTRAST CLINICAL:R10.30 Lower abdominal pain ICD10 TECHNIQUE: CT scans of the abdomen and pelvis were obtained without contrast as ordered All CT scans at this facility use dose modulation, iterative reconstruction, and/or weight based dosing when appropriate to reduce radiation dose to as low as reasonably achievable. FINDINGS: Some nonspecific reticular markings at the lung bases noted. Right-sided heart electrodes in place. .       No evidence of nonenhancing pathology in the liver or spleen. No suspicious lesions in the pancreas on this unenhanced study. Gallbladder and biliary tree unremarkable. There is gas and undigested debris within the stomach and there is gas in the small hiatal hernia. No inflammation in the epigastric soft tissues. Indwelling ureteral stent on the right side extends from the midpole the right kidney to the urinary bladder in satisfactory position. No hydronephrosis in the right kidney or evidence of renal stone disease. Left kidney is surgically absent. Atherosclerotic changes in the aorta noted but no evidence of aneurysm. There is stent material in the SMA proximally. There is calcification in the renal arteries bilaterally. No retroperitoneal mass or fluid collection. A few fluid filled small bowel loops in the left abdomen with no bowel wall edema or small bowel dilatation. No evidence of small bowel ischemia. The appendix is normal. There is gas and stool throughout the colon. There are diverticula in the colon with the majority located in the redundant sigmoid colon. No evidence of acute diverticulitis. No evidence of colonic obstruction. Anterior abdominal wall intact. The pelvic viscera are intact. No mass or inflammatory changes seen in the pelvis. No significant adenopathy along the pelvic sidewall. Bony structures intact. Degenerative changes overlie the lower dorsal spine and are fairly severe the lower lumbar spine at L5-S1. No compression fracture or infiltrative bony process. Mild degenerative hip disease. IMPRESSION: NO ACUTE PROCESS IN THE ABDOMEN OR PELVIS. Xr Chest Portable    Result Date: 11/8/2020  Exam: XR CHEST PORTABLE History:  SOB Technique: AP portable view of the chest obtained. Comparison: Chest x-ray from October 31, 2018 Findings: There is a left AICD device in place The cardiac silhouette is at the upper limits of normal in size.  There is mild elevation of the right hemidiaphragm with atelectasis versus early infiltrate in the left lung base. Bones of the thorax appear intact. There is mild atelectasis versus infiltrate in left lung base. Apparent elevation of the right hemidiaphragm may be due to a small pleural effusion or subpulmonic effusion.      Electronically signed by Delroy Bernal MD on 11/9/2020 at 4:34 PM

## 2020-11-09 NOTE — PROGRESS NOTES
Physical Therapy Med Surg Daily Treatment Note  Facility/Department[facility]  [facility]       NAME: Deepika Dee  : 1934 (9 y.o.)  MRN: [id number]  CODE STATUS: Full Code    Date of Service: 2020    Patient Diagnosis(es): Colon cancer ([address] [C18.9]   No chief complaint on file.     Patient Active Problem List    Diagnosis Date Noted    Colon cancer ([address] 2020    Glaucoma 2020    Cardiomyopathy ([address] 2020    Chronic kidney disease, stage III (moderate) 2019    Congestive heart failure ([address] 2019    Urothelial carcinoma ([address] 2019    Epigastric abdominal pain     Nausea     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     Mesenteric ischemia ([address] 02/15/2018    Abdominal pain     Atrial fibrillation ([address] 2018    Hypogonadism in male 2017    Sinus bradycardia on ECG 2016    Left bundle branch block 2016    Abnormal finding on EKG 2016    Primary bladder malignant neoplasm ([address] 2012    Bladder cancer ([address] 2012    Hypertension     Benign prostatic hyperplasia     GERD (gastroesophageal reflux disease)     Osteoarthritis     Type 2 diabetes mellitus without complication ([address]     Hyperlipidemia         Past Medical History:   Diagnosis Date    Abnormal finding on EKG 2016    Atrial fibrillation ([address] 2018    BPH (benign prostatic hypertrophy)     Cancer (HCC)     kidney - removed Left kidney    Colon polyps     Congestive heart failure ([address] 2019    GERD (gastroesophageal reflux disease)     Hyperlipidemia     Hypertension     Left bundle branch block 2016    Osteoarthritis     Sinus bradycardia on ECG 2016     Past Surgical History:   Procedure Laterality Date    ANGIOPLASTY  2018    percutaneous transluminal angioplasty of the SMA with stent implantation, Constantine Alfonso      COLONOSCOPY      COLONOSCOPY N/A 10/20/2020    COLONOSCOPY DIAGNOSTIC performed by Alvin Lopez MD at [address]  01/25/2019    DR Aida [address] N/A 11/4/2020    LAPAROSCOPIC RIGHT COLECTOMY performed by Mariam Young MD at [facility] ESOPHAGOGASTRODUODENOSCOPY TRANSORAL DIAGNOSTIC N/A 6/5/2018    EGD ESOPHAGOGASTRODUODENOSCOPY performed by Alvin Lopez MD at [facility]  08/12/2016    left kidney, Dr. Kenji Cohen N/A 10/20/2020    EGD ESOPHAGOGASTRODUODENOSCOPY performed by Alvin Lopez MD at [facility]  01/2019       Restrictions  Restrictions/Precautions: Fall Risk  Position Activity Restriction  Other position/activity restrictions: avasy monitor. SUBJECTIVE   General  Chart Reviewed: Yes  Family / Caregiver Present: No  Subjective  Subjective: pt able to state name and bday. kept stating im about ready [place]  General Comment  Comments: pt very confused    Pre-Session Pain Report     Pain Screening  Patient Currently in Pain: No  Pre Treatment Pain Screening  Pain at present: 0  Scale Used: Numeric Score  Intervention List: Patient able to continue with treatment    Post-Session Pain Report  Pain Assessment  Pain Assessment: 0-10  Pain Level: 0         OBJECTIVE        Bed mobility  Supine to Sit: Moderate assistance;2 Person assistance  Sit to Supine: Maximum assistance;2 Person assistance  Comment: increased time and effort to perform due to increased pt confusion, pt needing multiple VC and encourgament to participate with therapy due to this confusion. pt able to get BLE onto and out of bed, but needs assistance with trunk control. once sitting EOB pt demos fair trunk control and no LOB    Transfers  Sit to Stand:  Moderate Assistance;2 Person Assistance  Stand to sit: Moderate Assistance;2 Person Assistance  Comment: retro LOB upon inital stand, pt placing BLE against bed for support, decreased safety awareness, slightly impulsive. pt unwilling to continue once standing and lays himself back in bed                         Exercises  Comments: pt unwilling to perform                    Activity Tolerance  Activity Tolerance: Patient limited by cognitive status          ASSESSMENT     Pt needed max encouragement to participate with therapy this session, but was unwilling to attempt gait or perform any thereex. Pt demos decreased safety awareness and delayed righting reactions once standing characterized   by his frequent retro LOB which he is unaware of. Discharge Recommendations:  Patient would benefit from continued therapy after discharge    Goals  Long term goals  Long term goal 1: pt to be supervision with bed mobility  Long term goal 2: pt to be SBA with transfers  Long term goal 3: pt to participate in >10 minutes LE therex in order to improve mobility  Long term goal 4: pt to ambulate >50 ft with LRAD and SBA  Patient Goals   Patient goals : unable to state    PLAN    Times per week: 3-6  Safety Devices  Type of devices: Bed alarm in place, Call light within reach, All fall risk precautions in place, Left in bed     [facility] (6 CLICK) [address] Mobility Raw Score : 10     Therapy Time   Individual   Time In 1330   Time Out 1340   Minutes 10         transfer/bm:10    Janene Bourne PTA, 11/09/20 at 2:12 PM         Definitions for assistance levels  Independent = pt does not require any physical supervision or assistance from another person for activity completion. Device may be needed.   Stand by assistance = pt requires verbal cues or instructions from another person, close to but not touching, to perform the activity  Minimal assistance= pt performs 75% or more of the activity; assistance is required to complete the activity  Moderate assistance= pt performs 50% of the

## 2020-11-09 NOTE — PROGRESS NOTES
[facility]  Facility/Department: [facility]  Speech Language Pathology   Treatment Note          Yuan Sue  1934  [id number]    Medical Dx: Colon cancer [facility]) [C18.9]  Speech Dx: Dysphagia     11/9/2020    Subjective:  Alert, Cooperative and Pleasant        Interventions used this date:  Dysphagia Treatment    Objective/Assessment:  Patient progressing towards goals:  Short-term Goals  Timeframe for Short-term Goals: 1 week  Goal 1: Pt will tolerate puree consistencies and thin liquids with no overt s/s of aspiration and adequate oral clearance of bolus in all given opportunities. --Pt tolerate puree 5 x with no s/s of aspiration observed. Pt tolerate thins 6 x with no overt s/s of aspiration observed. Goal 2: Pt will complete oral motor strengthening exercises with 80% acc given cues as needed in order to increase lingual/labial/buccal musculature to decrease risk of aspiration. --Pt completed oral motor strengthening exercises with 80% accuracy following min verbal cues. Pt demonstrated difficulty with tongue extension on this date. Goal 3: Pt will complete tongue press exercise with 80% acc given cues as needed in order to promote anterior to posterior transit of bolus and decrease risk of aspiration. --Pt completed tongue press exercise in 5/5 trials with min verbal cues provided. Goal 4: Pt will implement safe swallow strategies (small bites/sips, cyclical ingestion, lingual sweep, double swallow) with 80% acc given cues as needed in order to decrease risk of aspiration. --Required min verbal cues to recall double swallow strategy. Pt observed to demonstrate small bites/sips independently. Goal 5: Pt will tolerate 5/5 trials of minced and moist consistencies given cues as needed in order to achieve least restrictive diet consistency. --Pt trialed minced/moist consistencies 2 x.   Pt observed to have moderate lingual residue and required min to mod verbal cues to clear bolus each time.  Not ready for diet upgrade. Continue with puree and thin liquids. Compensatory Swallowing Strategies: Alternate solids and liquids, Eat/Feed slowly, Upright as possible for all oral intake, Small bites/sips, Swallow 2 times per bite/sip      Treatment/Activity Tolerance:  Patient tolerated treatment well    Plan:  Continue per POC    Pain Assessment:  Initial Assessment:  Patient denies pain. Re-assessment:  Patient denies pain. Patient/Caregiver Education:  Patient educated on session and progression towards goals. Patient stated verbal understanding of directions.     Safety Devices:  Bed alarm in place and Call light within reach      Therapy Time  Time In: 11:50  Time Out: 11:58  Total Time: 8 min    Signature: Electronically signed by JONA Nielsen on 11/9/2020 at 1:01 PM

## 2020-11-10 VITALS
SYSTOLIC BLOOD PRESSURE: 114 MMHG | HEART RATE: 101 BPM | HEIGHT: 69 IN | OXYGEN SATURATION: 100 % | WEIGHT: 160 LBS | RESPIRATION RATE: 17 BRPM | BODY MASS INDEX: 23.7 KG/M2 | DIASTOLIC BLOOD PRESSURE: 70 MMHG | TEMPERATURE: 97.9 F

## 2020-11-10 LAB
ANION GAP SERPL CALCULATED.3IONS-SCNC: 11 MEQ/L (ref 9–15)
BASOPHILS ABSOLUTE: 0 K/UL (ref 0–0.2)
BASOPHILS RELATIVE PERCENT: 0.2 %
BUN BLDV-MCNC: 25 MG/DL (ref 8–23)
CALCIUM SERPL-MCNC: 7.8 MG/DL (ref 8.5–9.9)
CHLORIDE BLD-SCNC: 113 MEQ/L (ref 95–107)
CO2: 18 MEQ/L (ref 20–31)
CREAT SERPL-MCNC: 1.26 MG/DL (ref 0.7–1.2)
EOSINOPHILS ABSOLUTE: 0.2 K/UL (ref 0–0.7)
EOSINOPHILS RELATIVE PERCENT: 3.1 %
GFR AFRICAN AMERICAN: >60
GFR NON-AFRICAN AMERICAN: 54.2
GLUCOSE BLD-MCNC: 111 MG/DL (ref 70–99)
HCT VFR BLD CALC: 22.6 % (ref 42–52)
HEMOGLOBIN: 7.8 G/DL (ref 14–18)
LYMPHOCYTES ABSOLUTE: 0.7 K/UL (ref 1–4.8)
LYMPHOCYTES RELATIVE PERCENT: 11.7 %
MCH RBC QN AUTO: 31.1 PG (ref 27–31.3)
MCHC RBC AUTO-ENTMCNC: 34.3 % (ref 33–37)
MCV RBC AUTO: 90.6 FL (ref 80–100)
MONOCYTES ABSOLUTE: 0.7 K/UL (ref 0.2–0.8)
MONOCYTES RELATIVE PERCENT: 11.9 %
NEUTROPHILS ABSOLUTE: 4.1 K/UL (ref 1.4–6.5)
NEUTROPHILS RELATIVE PERCENT: 73.1 %
PDW BLD-RTO: 14.4 % (ref 11.5–14.5)
PLATELET # BLD: 175 K/UL (ref 130–400)
POTASSIUM SERPL-SCNC: 3.4 MEQ/L (ref 3.4–4.9)
RBC # BLD: 2.5 M/UL (ref 4.7–6.1)
SARS-COV-2, NAAT: NOT DETECTED
SODIUM BLD-SCNC: 142 MEQ/L (ref 135–144)
WBC # BLD: 5.6 K/UL (ref 4.8–10.8)

## 2020-11-10 PROCEDURE — 6370000000 HC RX 637 (ALT 250 FOR IP): Performed by: COLON & RECTAL SURGERY

## 2020-11-10 PROCEDURE — 36415 COLL VENOUS BLD VENIPUNCTURE: CPT

## 2020-11-10 PROCEDURE — 92526 ORAL FUNCTION THERAPY: CPT

## 2020-11-10 PROCEDURE — 97535 SELF CARE MNGMENT TRAINING: CPT

## 2020-11-10 PROCEDURE — 2580000003 HC RX 258: Performed by: COLON & RECTAL SURGERY

## 2020-11-10 PROCEDURE — 85025 COMPLETE CBC W/AUTO DIFF WBC: CPT

## 2020-11-10 PROCEDURE — 80048 BASIC METABOLIC PNL TOTAL CA: CPT

## 2020-11-10 PROCEDURE — U0002 COVID-19 LAB TEST NON-CDC: HCPCS

## 2020-11-10 PROCEDURE — 99024 POSTOP FOLLOW-UP VISIT: CPT | Performed by: COLON & RECTAL SURGERY

## 2020-11-10 PROCEDURE — 6360000002 HC RX W HCPCS: Performed by: SURGERY

## 2020-11-10 PROCEDURE — 6370000000 HC RX 637 (ALT 250 FOR IP): Performed by: NURSE PRACTITIONER

## 2020-11-10 PROCEDURE — 97116 GAIT TRAINING THERAPY: CPT

## 2020-11-10 RX ORDER — IPRATROPIUM BROMIDE AND ALBUTEROL SULFATE 2.5; .5 MG/3ML; MG/3ML
3 SOLUTION RESPIRATORY (INHALATION) EVERY 4 HOURS PRN
Qty: 360 ML | Refills: 1 | Status: SHIPPED | OUTPATIENT
Start: 2020-11-10

## 2020-11-10 RX ORDER — IBUPROFEN 600 MG/1
600 TABLET ORAL 4 TIMES DAILY PRN
Qty: 40 TABLET | Refills: 0 | Status: SHIPPED | OUTPATIENT
Start: 2020-11-10

## 2020-11-10 RX ADMIN — Medication 10 ML: at 10:15

## 2020-11-10 RX ADMIN — METOCLOPRAMIDE HYDROCHLORIDE 5 MG: 5 INJECTION INTRAMUSCULAR; INTRAVENOUS at 13:09

## 2020-11-10 RX ADMIN — PANTOPRAZOLE SODIUM 40 MG: 40 TABLET, DELAYED RELEASE ORAL at 06:49

## 2020-11-10 RX ADMIN — TAMSULOSIN HYDROCHLORIDE 0.4 MG: 0.4 CAPSULE ORAL at 10:15

## 2020-11-10 RX ADMIN — METOCLOPRAMIDE HYDROCHLORIDE 5 MG: 5 INJECTION INTRAMUSCULAR; INTRAVENOUS at 03:16

## 2020-11-10 ASSESSMENT — ENCOUNTER SYMPTOMS
NAUSEA: 0
COUGH: 0
SHORTNESS OF BREATH: 0
DIARRHEA: 0
VOMITING: 0

## 2020-11-10 ASSESSMENT — PAIN SCALES - GENERAL
PAINLEVEL_OUTOF10: 0

## 2020-11-10 NOTE — FLOWSHEET NOTE
Patient sleepy but arousable. He is confused. Abdomen distended. BS heard on the LLQ. Staples intact to midline incision. Bandaid and steri-strips to lap sites. Bal draining austin urine. He denies pain. O2 on per NC. Repositioned for comfort. Avasys in room. Bed alarm. Continue to monitor.

## 2020-11-10 NOTE — CARE COORDINATION
Carmen called from [facility]. Confirms that insurance is not in their network. Called [facility] and updated. They to discuss other choices and call back tomorrow.
Cong and Mariela Morgan phoned. HIPAA code provided. Updated on poc and discussed that pt is getting out of bed with assistance to use the restroom. They state they called him this morning and he was still confused intermittently. They state they choose [facility]. She to fax over [facility] paperwork on Monday. [facility] for referral and referred to Noa Tadeo at [phone]. Left voicemail.
Phone message from [facility] sharing the name and number of Saran Lambert [phone], the family member who is to help pt at DC. Report received from nursing that family now wants DC to [address] Initial referral made. [facility] is in need of H&P and updated therapy notes. H&P faxed. Page out to therapy. Missy Denny can accept pt. [phone] complete.
Plan DC to [facility] who can accept today. Family notified. Transport scheduled for 4 pm. [id number] complete.
Spoke with neighbor Joesph Conn, whom is a nurse in [facility]. HIPAA code provided. She states this confusion is out of the ordinary for patient. She states pt called her this morning, but she didn't get to answer the phone. 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 She gave number for Perla Babcock, son who lives with his wife, [facility] [address] in [place], [place]. [facility] [address] and [place] at [phone] and discussed [address]. HIPAA code provided. They confirm patient called them this morning and is confused. They state this is not normal and they are agreeable to SNF placement until is able to go home. They have mentioned [facility] and will contact me with further choices after discussing with family/friends. RN Eliezer Thorpe also gave updates.
11/5/2020 at 10:24 AM

## 2020-11-10 NOTE — PROGRESS NOTES
[facility]  Facility/Department: [facility]  Speech Language Pathology   Treatment Note    Fracisco Paredes  1934  [id number]    Medical Dx: Colon cancer [facility]) [C18.9]  Speech Dx: Dysphagia     11/10/2020    Subjective:  Alert, Cooperative and Pleasant    Pt reported that he lost his cell phone at some point while in the hospital. Pt asked that SLP attempt to locate it. SLP informed RN Yamila who will discuss with care coordination. Interventions used this date:  Dysphagia Treatment    Objective/Assessment:  Patient progressing towards goals:  Short-term Goals  Timeframe for Short-term Goals: 1 week  Goal 1: Pt will tolerate puree consistencies and thin liquids with no overt s/s of aspiration and adequate oral clearance of bolus in all given opportunities. Pt declined PO at this time secondary to nausea. Goal 2: Pt will complete oral motor strengthening exercises with 80% acc given cues as needed in order to increase lingual/labial/buccal musculature to decrease risk of aspiration. Pt declined PO at this time secondary to nausea  Goal 3: Pt will complete tongue press exercise with 80% acc given cues as needed in order to promote anterior to posterior transit of bolus and decrease risk of aspiration. Pt completed tongue press exercise, 5 sets of 5, with min verbal cues. Goal 4: Pt will implement safe swallow strategies (small bites/sips, cyclical ingestion, lingual sweep, double swallow) with 80% acc given cues as needed in order to decrease risk of aspiration. Pt recalled small bite/sip strategy but did not recall the remainder of the above listed strategies. SLP re-educated the pt regarding the recommended strategies and their rationale. Goal 5: Pt will tolerate 5/5 trials of minced and moist consistencies given cues as needed in order to achieve least restrictive diet consistency. Pt trialed minced/moist solids x1.  Pt able to adequately masticate and clear the bolus independently with use of liquid wash to clear min residuals on the superior tongue. Pt then declined additional trials secondary to nausea. Compensatory Swallowing Strategies: Alternate solids and liquids, Eat/Feed slowly, Upright as possible for all oral intake, Small bites/sips, Swallow 2 times per bite/sip      Treatment/Activity Tolerance:  Patient tolerated treatment well    Plan:  Continue per POC    Pain Assessment:  Initial Assessment:  Patient denies pain. Re-assessment:  Patient denies pain. Patient/Caregiver Education:  Patient educated on session and progression towards goals. Education needs reinforcement.     Safety Devices:  Bed alarm in place and Call light within reach      Therapy Time  Time In: 1052  Time Out: 1106  Total Minutes: 14    Signature: Electronically signed by JONA Mckeon on 11/10/2020 at 11:13 AM

## 2020-11-10 NOTE — PROGRESS NOTES
Physical Therapy Med Surg Daily Treatment Note  Facility/Department: [facility]  [place] [facility] [place]       NAME: Niesha Gandhi  : 1934 (4 y.o.)  MRN: [id number]  CODE STATUS: Full Code    Date of Service: 11/10/2020    Patient Diagnosis(es): Colon cancer ([facility] [address] [C18.9]   No chief complaint on file.     Patient Active Problem List    Diagnosis Date Noted    Colon cancer ([address] 2020    Glaucoma 2020    Cardiomyopathy ([address] 2020    Chronic kidney disease, stage III (moderate) 2019    Congestive heart failure ([address] 2019    Urothelial carcinoma ([address] 2019    Epigastric abdominal pain     Nausea     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     Mesenteric ischemia ([address] 02/15/2018    Abdominal pain     Atrial fibrillation ([address] 2018    Hypogonadism in male 2017    Sinus bradycardia on ECG 2016    Left bundle branch block 2016    Abnormal finding on EKG 2016    Primary bladder malignant neoplasm ([address] 2012    Bladder cancer ([address] 2012    Hypertension     Benign prostatic hyperplasia     GERD (gastroesophageal reflux disease)     Osteoarthritis     Type 2 diabetes mellitus without complication ([address]     Hyperlipidemia         Past Medical History:   Diagnosis Date    Abnormal finding on EKG 2016    Atrial fibrillation ([address] 2018    BPH (benign prostatic hypertrophy)     Cancer (HCC)     kidney - removed Left kidney    Colon polyps     Congestive heart failure ([address] 2019    GERD (gastroesophageal reflux disease)     Hyperlipidemia     Hypertension     Left bundle branch block 2016    Osteoarthritis     Sinus bradycardia on ECG 2016     Past Surgical History:   Procedure Laterality Date    ANGIOPLASTY  2018    percutaneous transluminal angioplasty of the SMA with stent implantation, Constantine Alfonso      COLONOSCOPY      COLONOSCOPY N/A 10/20/2020    COLONOSCOPY DIAGNOSTIC performed by Rosario Marroquin MD at [facility] 192  01/25/2019    DR Aida Montanez 35 N/A 11/4/2020    LAPAROSCOPIC RIGHT COLECTOMY performed by Tashia Coyne MD at [facility] ESOPHAGOGASTRODUODENOSCOPY TRANSORAL DIAGNOSTIC N/A 6/5/2018    EGD ESOPHAGOGASTRODUODENOSCOPY performed by Rosario Marroquin MD at [facility] 58  08/12/2016    left kidney, Dr. Nicholson Median N/A 10/20/2020    EGD ESOPHAGOGASTRODUODENOSCOPY performed by Rosario Marroquin MD at [place] [address]  01/2019       Restrictions       SUBJECTIVE   General  Chart Reviewed: Yes  Response To Previous Treatment: Patient with no complaints from previous session. Family / Caregiver Present: No  Subjective  Subjective: patient resting in bed, agreeable to tx. Pre-Session Pain Report     Pain Screening  Patient Currently in Pain: Denies  Post-Session Pain Report  Denies   OBJECTIVE        Bed mobility  Supine to Sit: Stand by assistance  Sit to Supine: Stand by assistance  Scooting: Stand by assistance  Comment: patient required increased time/effort and use of bedrail. Transfers  Sit to Stand: Minimal Assistance;Contact guard assistance  Stand to sit: Minimal Assistance;Contact guard assistance  Comment: sit to stand x5- technique improves with repetition    Ambulation  Ambulation?: Yes  Ambulation 1  Assistance: Contact guard assistance;Minimal assistance  Quality of Gait: NBOS, initially unsteady however balance improved with distance, reciprocal, significant decrease in speed/corbin  Distance: 20 feet x2 - 1 seated RB  Exercises  Comments: static standing balance without UE support     ASSESSMENT   Body structures, Functions, Activity limitations: Decreased functional mobility ; Decreased balance;Decreased endurance;Decreased strength;Decreased coordination;Decreased cognition;Decreased safe awareness  Assessment: Patient alert and oriented to place upon arrival. Demonstrates decreased confusion during session. Initiated gait training with ww. Patient ambulates short distances in room at CGA/min A due to balance deficits. Prognosis: Good  REQUIRES PT FOLLOW UP: Yes     Discharge Recommendations:  Patient would benefit from continued therapy after discharge    Goals  Long term goals  Long term goal 1: pt to be supervision with bed mobility  Long term goal 2: pt to be SBA with transfers  Long term goal 3: pt to participate in >10 minutes LE therex in order to improve mobility  Long term goal 4: pt to ambulate >50 ft with LRAD and SBA  Patient Goals   Patient goals : unable to state    PLAN    Times per week: 3-6        [facility] (6 CLICK) [address] Mobility Raw Score : 10     Therapy Time   Individual   Time In 1400   Time Out 1425   Minutes 25     Timed Code Treatment Minutes: 25 Minutes     Gt 10  Tr 10   Neuro 5   [place], [facility], 11/10/20 at 2:49 PM         Definitions for assistance levels  Independent = pt does not require any physical supervision or assistance from another person for activity completion. Device may be needed.   Stand by assistance = pt requires verbal cues or instructions from another person, close to but not touching, to perform the activity  Minimal assistance= pt performs 75% or more of the activity; assistance is required to complete the activity  Moderate assistance= pt performs 50% of the activity; assistance is required to complete the activity  Maximal assistance = pt performs 25% of the activity; assistance is required to complete the activity  Dependent = pt requires total physical assistance to accomplish the task

## 2020-11-10 NOTE — PROGRESS NOTES
108  Respiration Range (24h): Resp  Av  Min: 16  Max: 20    GENERAL: alert, no distress  LUNGS: clear to ausculation, without wheezes, rales or rhonci  HEART: normal rate and regular rhythm  ABDOMEN: soft, non-tender, non-distended and no guarding or peritoneal signs  EXTERMITY: no cyanosis, clubbing or edema  In: 120 [P.O.:120]  Out: 450 [Urine:450]  Date 11/10/20 0000 - 11/10/20 2359   Shift 2920-2077 0105-3861 9292-7577 24 Hour Total   INTAKE   P.O.(mL/kg/hr) 120   120   Shift Total(mL/kg) 120(1.7)   120(1.7)   OUTPUT   Urine(mL/kg/hr) 450   450   Shift Total(mL/kg) 450(6.2)   450(6.2)   Weight (kg) 72.6 72.6 72.6 72.6       LABS  Recent Labs     20  0748 20  0612 20  0701 11/10/20  0555   WBC 1.9* 2.7* 4.5* 5.6   HGB 8.4* 8.4* 8.2* 7.8*   HCT 25.3* 24.8* 24.0* 22.6*    142 163 175     --  135 142   K 3.7  --  3.2* 3.4     --  106 113*   CO2 18*  --  19* 18*   BUN 30*  --  24* 25*   CREATININE 1.64*  --  1.35* 1.26*   CALCIUM 8.1*  --  7.9* 7.8*      No results for input(s): PTT, INR in the last 72 hours. Invalid input(s): PT  Recent Labs     20  0701   AST 15   ALT 15   BILITOT 0.3       RADIOLOGY  Ct Abdomen Pelvis Wo Contrast Additional Contrast? Radiologist Recommendation    Result Date: 10/19/2020  EXAM: CT ABDOMEN PELVIS WO CONTRAST CLINICAL:R10.30 Lower abdominal pain ICD10 TECHNIQUE: CT scans of the abdomen and pelvis were obtained without contrast as ordered All CT scans at this facility use dose modulation, iterative reconstruction, and/or weight based dosing when appropriate to reduce radiation dose to as low as reasonably achievable. FINDINGS: Some nonspecific reticular markings at the lung bases noted. Right-sided heart electrodes in place. .       No evidence of nonenhancing pathology in the liver or spleen. No suspicious lesions in the pancreas on this unenhanced study. Gallbladder and biliary tree unremarkable.  There is gas and undigested debris within the stomach and there is gas in the small hiatal hernia. No inflammation in the epigastric soft tissues. Indwelling ureteral stent on the right side extends from the midpole the right kidney to the urinary bladder in satisfactory position. No hydronephrosis in the right kidney or evidence of renal stone disease. Left kidney is surgically absent. Atherosclerotic changes in the aorta noted but no evidence of aneurysm. There is stent material in the SMA proximally. There is calcification in the renal arteries bilaterally. No retroperitoneal mass or fluid collection. A few fluid filled small bowel loops in the left abdomen with no bowel wall edema or small bowel dilatation. No evidence of small bowel ischemia. The appendix is normal. There is gas and stool throughout the colon. There are diverticula in the colon with the majority located in the redundant sigmoid colon. No evidence of acute diverticulitis. No evidence of colonic obstruction. Anterior abdominal wall intact. The pelvic viscera are intact. No mass or inflammatory changes seen in the pelvis. No significant adenopathy along the pelvic sidewall. Bony structures intact. Degenerative changes overlie the lower dorsal spine and are fairly severe the lower lumbar spine at L5-S1. No compression fracture or infiltrative bony process. Mild degenerative hip disease. IMPRESSION: NO ACUTE PROCESS IN THE ABDOMEN OR PELVIS. Xr Chest Portable    Result Date: 11/8/2020  Exam: XR CHEST PORTABLE History:  SOB Technique: AP portable view of the chest obtained. Comparison: Chest x-ray from October 31, 2018 Findings: There is a left AICD device in place The cardiac silhouette is at the upper limits of normal in size. There is mild elevation of the right hemidiaphragm with atelectasis versus early infiltrate in the left lung base. Bones of the thorax appear intact.      There is mild atelectasis versus infiltrate in left lung base. Apparent elevation of the right hemidiaphragm may be due to a small pleural effusion or subpulmonic effusion.      Electronically signed by Michelle Farnsworth MD on 11/10/2020 at 7:15 AM

## 2020-11-10 NOTE — PROGRESS NOTES
Nephrology Progress Note    Assessment:  Resolved HONEY  Hx left nephrectomy  Bladder cancer history  OHD AF  Anemia  Right colectomy d/t cancer    Plan:will sign off case  No follow up  Transfusion per surgery    Patient Active Problem List:     Hypertension     Benign prostatic hyperplasia     GERD (gastroesophageal reflux disease)     Osteoarthritis     Type 2 diabetes mellitus without complication (HCC)     Hyperlipidemia     Bladder cancer (HCC)     Sinus bradycardia on ECG     Left bundle branch block     Abnormal finding on EKG     Primary bladder malignant neoplasm (HCC)     Hypogonadism in male     Atrial fibrillation (HCC)     Abdominal pain     Mesenteric ischemia (HCC)     Epigastric abdominal pain     Nausea     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     Urothelial carcinoma (HCC)     Chronic kidney disease, stage III (moderate)     Congestive heart failure (HCC)     Colon cancer (HCC)     Glaucoma     Cardiomyopathy ([facility] [address]      Subjective:  Admit Date: 11/4/2020    Interval History:stable    Medications:  Scheduled Meds:   metoclopramide  5 mg Intravenous Q8H    pantoprazole  40 mg Oral QAM AC    atorvastatin  20 mg Oral Daily    tamsulosin  0.4 mg Oral Daily    sodium chloride flush  10 mL Intravenous 2 times per day    enoxaparin  40 mg Subcutaneous Daily    latanoprost  1 drop Both Eyes Nightly     Continuous Infusions:    CBC:   Recent Labs     11/09/20  0701 11/10/20  0555   WBC 4.5* 5.6   HGB 8.2* 7.8*    175     CMP:    Recent Labs     11/09/20  0701 11/10/20  0555    142   K 3.2* 3.4    113*   CO2 19* 18*   BUN 24* 25*   CREATININE 1.35* 1.26*   GLUCOSE 140* 111*   CALCIUM 7.9* 7.8*   LABGLOM 50.1* 54.2*     Troponin: No results for input(s): TROPONINI in the last 72 hours. BNP: No results for input(s): BNP in the last 72 hours. INR: No results for input(s): INR in the last 72 hours.   Lipids: No results for input(s): CHOL, LDLDIRECT, TRIG, HDL, AMYLASE, LIPASE in the last 72 hours. Liver:   Recent Labs     11/09/20  0701   AST 15   ALT 15   ALKPHOS 51   PROT 5.5*   LABALBU 3.1*   BILITOT 0.3     Iron:  No results for input(s): IRONS, FERRITIN in the last 72 hours. Invalid input(s): LABIRONS  Urinalysis: No results for input(s): UA in the last 72 hours.     Objective:  Vitals: BP (!) 90/58   Pulse 65   Temp 98.3 °F (36.8 °C) (Oral)   Resp 16   Ht 5' 9\" (1.753 m)   Wt 160 lb (72.6 kg)   SpO2 99%   BMI 23.63 kg/m²    Wt Readings from Last 3 Encounters:   11/04/20 160 lb (72.6 kg)   10/29/20 160 lb 6.4 oz (72.8 kg)   10/27/20 160 lb (72.6 kg)      24HR INTAKE/OUTPUT:      Intake/Output Summary (Last 24 hours) at 11/10/2020 0841  Last data filed at 11/10/2020 0458  Gross per 24 hour   Intake 120 ml   Output 450 ml   Net -330 ml       General: alert, in no apparent distress  HEENT: normocephalic, atraumatic, anicteric  Neck: supple, no mass  Lungs: non-labored respirations, clear to auscultation bilaterally  Heart: regular rate and rhythm, no murmurs or rubs  Abdomen: soft, non-tender, non-distended  Ext: no cyanosis, no peripheral edema  Neuro: alert and oriented, no gross abnormalities  Psych: normal mood and affect  Skin: no rash      Electronically signed by Yanely Lau MD

## 2020-11-10 NOTE — PROGRESS NOTES
Assessment completed this shift. Alert and oriented x 3. Uncertain of date and confused at times. Pacemaker left chest. BS present. Abdominal incision closed with staples, well approximated and TIMI. Slight bruising noted. Bathed by PCA, Olga. Removed aguero. Pt tolerated well.  .  Electronically signed by Mile Pelaez RN on 11/10/2020 at 2:33 PM

## 2020-11-10 NOTE — PROGRESS NOTES
Patient assessed and charted on by this RN. Vital signs are stable. Patient A&Ox4 but is confused at times. Easy to reorient. Patient abdomen clean dry and intact staples open to air. Bal currently in place. AVASYS in room. Fall precautions in place. Call light in reach. Will continue to monitor.

## 2020-11-10 NOTE — PROGRESS NOTES
Progress Note  Date:11/10/2020       [facility]:[id number]  Patient Name:Luis Petersen     YOB: 1934     Age:86 y.o. Pt was seen and evaluated, SOB is better compare to before. Subjective    Subjective:  Symptoms:  He reports weakness. No shortness of breath, malaise, cough, chest pain, headache, chest pressure, anorexia, diarrhea or anxiety. Diet:  No nausea or vomiting. Review of Systems   Respiratory: Negative for cough and shortness of breath. Cardiovascular: Negative for chest pain. Gastrointestinal: Negative for anorexia, diarrhea, nausea and vomiting. Neurological: Positive for weakness. Objective         Vitals Last 24 Hours:  TEMPERATURE:  Temp  Av.9 °F (36.6 °C)  Min: 97.3 °F (36.3 °C)  Max: 98.6 °F (37 °C)  RESPIRATIONS RANGE: Resp  Av.3  Min: 16  Max: 18  PULSE OXIMETRY RANGE: SpO2  Av.2 %  Min: 95 %  Max: 100 %  PULSE RANGE: Pulse  Av.2  Min: 65  Max: 108  BLOOD PRESSURE RANGE: Systolic (06YPQ), HMS:201 , Min:90 , ICP:341   ; Diastolic (60WAW), POP:18, Min:54, Max:78    I/O (24Hr): Intake/Output Summary (Last 24 hours) at 11/10/2020 1138  Last data filed at 11/10/2020 1015  Gross per 24 hour   Intake 130 ml   Output 450 ml   Net -320 ml     Objective:  General Appearance:  Comfortable, well-appearing and in no acute distress. Vital signs: (most recent): Blood pressure 114/70, pulse 101, temperature 97.9 °F (36.6 °C), temperature source Oral, resp. rate 17, height 5' 9\" (1.753 m), weight 160 lb (72.6 kg), SpO2 100 %. HEENT: Normal HEENT exam.    Lungs:  Normal effort and normal respiratory rate. Breath sounds clear to auscultation. Heart: Normal rate. Abdomen: Abdomen is distended. Bowel sounds are normal.   There is no epigastric area or suprapubic area tenderness. Extremities: There is no deformity. Pulses: Distal pulses are intact. Neurological: Patient is alert.     Pupils:  Pupils are equal, round, and reactive to light.    Skin:  Warm and dry. Labs/Imaging/Diagnostics    Labs:  CBC:  Recent Labs     11/08/20  0612 11/09/20  0701 11/10/20  0555   WBC 2.7* 4.5* 5.6   RBC 2.70* 2.63* 2.50*   HGB 8.4* 8.2* 7.8*   HCT 24.8* 24.0* 22.6*   MCV 91.9 91.5 90.6   RDW 14.4 14.2 14.4    163 175     CHEMISTRIES:  Recent Labs     11/09/20  0701 11/10/20  0555    142   K 3.2* 3.4    113*   CO2 19* 18*   BUN 24* 25*   CREATININE 1.35* 1.26*   GLUCOSE 140* 111*     PT/INR:No results for input(s): PROTIME, INR in the last 72 hours. APTT:No results for input(s): APTT in the last 72 hours. LIVER PROFILE:  Recent Labs     11/09/20 0701   AST 15   ALT 15   BILITOT 0.3   ALKPHOS 51       Imaging Last 24 Hours:  No results found.   Assessment//Plan           Hospital Problems           Last Modified POA    Benign prostatic hyperplasia 11/4/2020 Yes    Overview Signed 10/14/2017  7:25 PM by [facility]     Updating Deprecated Diagnoses         GERD (gastroesophageal reflux disease) 11/4/2020 Yes    Hyperlipidemia 11/4/2020 Yes    Atrial fibrillation ([address] 11/4/2020 Yes    Colon cancer ([facility] [address] 11/4/2020 Yes    Glaucoma 11/4/2020 Yes    Cardiomyopathy ([address] 11/4/2020 Yes        Assessment & Plan    1) Colon cancer   S/p  Laparoscopic right colectomy with primary anastomosis  Still has bleeding  hgb is stable  FU surgery  2) GERD c/w prilosec  3) HLD  C/w statin  4) BPH  C/w flomax  5) afib c/w BB  Resume eliquis when ok with surgery  6) Cardiomyopahty with EF of 20 %   euvolemic    7) pt/ot for weakness  Electronically signed by A  8) HONEY on CKD renal eval  9) SOB, scatered rhonchi and wheez  Resolved  duonebs prn  No PNA  Atelectasis  C/w duonebs  Doing better  FU with PCP in 1 week  Dc summary was per primary team  Cindy Wilson MD on 11/5/20 at 10:54 AM EST

## 2020-11-11 ENCOUNTER — OFFICE VISIT (OUTPATIENT)
Dept: GERIATRIC MEDICINE | Age: 85
End: 2020-11-11
Payer: MEDICARE

## 2020-11-11 PROCEDURE — 99308 SBSQ NF CARE LOW MDM 20: CPT | Performed by: INTERNAL MEDICINE

## 2020-11-12 LAB
BASOPHILS ABSOLUTE: ABNORMAL
BASOPHILS RELATIVE PERCENT: ABNORMAL
BUN BLDV-MCNC: 22 MG/DL
CALCIUM SERPL-MCNC: 8.1 MG/DL
CHLORIDE BLD-SCNC: 108 MMOL/L
CO2: 22 MMOL/L
CREAT SERPL-MCNC: 1.18 MG/DL
EOSINOPHILS ABSOLUTE: ABNORMAL
EOSINOPHILS RELATIVE PERCENT: ABNORMAL
GFR CALCULATED: NORMAL
GLUCOSE BLD-MCNC: 111 MG/DL
HCT VFR BLD CALC: 23.4 % (ref 41–53)
HEMOGLOBIN: 7.6 G/DL (ref 13.5–17.5)
LYMPHOCYTES ABSOLUTE: ABNORMAL
LYMPHOCYTES RELATIVE PERCENT: ABNORMAL
MCH RBC QN AUTO: 29.8 PG
MCHC RBC AUTO-ENTMCNC: 32.5 G/DL
MCV RBC AUTO: 91.8 FL
MONOCYTES ABSOLUTE: ABNORMAL
MONOCYTES RELATIVE PERCENT: ABNORMAL
NEUTROPHILS ABSOLUTE: ABNORMAL
NEUTROPHILS RELATIVE PERCENT: ABNORMAL
PLATELET # BLD: 216 K/ΜL
PMV BLD AUTO: 11.5 FL
POTASSIUM SERPL-SCNC: 3.3 MMOL/L
RBC # BLD: 2.55 10^6/ΜL
SODIUM BLD-SCNC: 141 MMOL/L
WBC # BLD: 6.11 10^3/ML

## 2020-11-12 PROCEDURE — 93010 ELECTROCARDIOGRAM REPORT: CPT | Performed by: INTERNAL MEDICINE

## 2020-11-12 RX ORDER — ACETAMINOPHEN 325 MG/1
650 TABLET ORAL EVERY 4 HOURS PRN
COMMUNITY

## 2020-11-13 NOTE — DISCHARGE SUMMARY
Physician Discharge Summary     Patient ID:  Ariana Amor  [id number]  80 y.o.  1934    Admit date: 11/4/2020    Discharge date and time: 11/10/2020  5:16 PM     Admitting Physician: Phyllis Gauthier MD     Discharge Physician: Same    Admission Diagnoses: Colon cancer [facility]) [C18.9]    Discharge Diagnoses: Same    Admission Condition: good    Discharged Condition: good    Indication for Admission: Colon cancer    Hospital Course: Patient was taken to the operating room on 11/4/2020 for a right colectomy, the details which can be found in the operative report. He was taken to the floor postoperatively. His pain was controlled with an epidural for the first 2 days and then discontinued. Patient had mental status changes consistent with his underlying early dementia. He was watched closely for safety reasons. He was started on clear liquids on postop day #1 advance to a soft low fiber diet prior to discharge. Hospitalist were consulted for assistance with medical comorbidities. His Bal catheter was removed around postop day #3 and he was able to urinate without problems. Physical therapy worked with him for assistance with mobility. DVT prophylaxis was given throughout his hospital stay. 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 Upon the time of discharge patient was tolerating diet. His incision looked fine. The remainder of his exam is as documented in the medical record. He was transferred to a skilled nursing facility for assistance with transition to home. Consults: Hospitalist service    Significant Diagnostic Studies: labs: CBC, CMP    Treatments: surgery: Right colectomy as described above    Discharge Exam:  See exam dated 11/10/2020    Disposition: SNF    In process/preliminary results:  Outstanding Order Results     No orders found from 10/6/2020 to 11/5/2020.           Patient Instructions:   Discharge Medication List as of 11/10/2020  4:25 PM START taking these medications    Details   ipratropium-albuterol (DUONEB) 0.5-2.5 (3) MG/3ML SOLN nebulizer solution Inhale 3 mLs into the lungs every 4 hours as needed for Shortness of Breath, Disp-360 mL,R-1Normal      ibuprofen (ADVIL;MOTRIN) 600 MG tablet Take 1 tablet by mouth 4 times daily as needed for Pain, Disp-40 tablet,R-0Normal         CONTINUE these medications which have NOT CHANGED    Details   omeprazole (PRILOSEC) 40 MG delayed release capsule Take 1 capsule by mouth every morning (before breakfast), Disp-90 capsule,R-1Normal      carvedilol (COREG) 3.125 MG tablet Take 1 tablet by mouth 2 times daily, Disp-60 tablet,R-3Normal      midodrine (PROAMATINE) 2.5 MG tablet Take 1 tablet by mouth 3 times daily, Disp-90 tablet,R-3Normal      apixaban (ELIQUIS) 2.5 MG TABS tablet Take 2.5 mg by mouth 2 times dailyHistorical Med      tamsulosin (FLOMAX) 0.4 MG capsule Take 0.4 mg by mouth dailyHistorical Med      simvastatin (ZOCOR) 20 MG tablet Take 1 tablet by mouth nightly, Disp-90 tablet, R-0Normal      Multiple Vitamins-Minerals (EYE VITAMINS) CAPS Take 1 capsule by mouth dailyHistorical Med      ondansetron (ZOFRAN) 4 MG tablet TAKE 1 TO 2 TABLETS EVERY 4 TO 6 HOURS AS NEEDED FOR NAUSEA AND VOMITING, Disp-30 tablet, R-1Normal      latanoprost (XALATAN) 0.005 % ophthalmic solution Place 1 drop into both eyes nightlyHistorical Med           Activity: activity as tolerated  Diet: regular diet  Wound Care: keep wound clean and dry    Follow-up with Rolando Bernal in 1 week.     Mikal Mercedes  11/13/2020  7:15 AM

## 2020-11-17 ENCOUNTER — OFFICE VISIT (OUTPATIENT)
Dept: SURGERY | Age: 85
End: 2020-11-17

## 2020-11-17 VITALS
TEMPERATURE: 96.3 F | WEIGHT: 160 LBS | HEIGHT: 69 IN | OXYGEN SATURATION: 94 % | BODY MASS INDEX: 23.7 KG/M2 | HEART RATE: 52 BPM

## 2020-11-17 PROCEDURE — 99024 POSTOP FOLLOW-UP VISIT: CPT | Performed by: COLON & RECTAL SURGERY

## 2020-11-17 NOTE — PROGRESS NOTES
Subjective:      Patient ID: Delgado Francis is a 80 y.o. male who presents for:  Chief Complaint   Patient presents with    Post-Op Check       He returns to the office 2 weeks out from a laparoscopic assisted right colectomy for cancer. He had a stage II colon cancer without lymph nodes involved. He has been at [facility]. He has had some serous drainage from his incision which is improving. His concern is of some difficulty with constipation since surgery and he wishes to go back on the MiraLAX he was taking. He denies any nausea or vomiting. He denies any fever. Histology was reviewed.       Past Medical History:   Diagnosis Date    Abnormal finding on EKG 6/13/2016    Atrial fibrillation (HCC) 1/22/2018    BPH (benign prostatic hypertrophy)     Cancer (HCC)     kidney - removed Left kidney    Colon polyps     Congestive heart failure ([address] 1/24/2019    GERD (gastroesophageal reflux disease)     Hyperlipidemia     Hypertension     Left bundle branch block 6/13/2016    Osteoarthritis     Sinus bradycardia on ECG 6/13/2016     Past Surgical History:   Procedure Laterality Date    ANGIOPLASTY  05/08/2018    percutaneous transluminal angioplasty of the SMA with stent implantation, [place] [address] [facility] COLONOSCOPY  2016    COLONOSCOPY N/A 10/20/2020    COLONOSCOPY DIAGNOSTIC performed by Rosario Marroquin MD at [place] [address]  01/25/2019    DR Aida Montanez 35 N/A 11/4/2020    LAPAROSCOPIC RIGHT COLECTOMY performed by Tashia Coyne MD at [address] [facility] ESOPHAGOGASTRODUODENOSCOPY TRANSORAL DIAGNOSTIC N/A 6/5/2018    EGD ESOPHAGOGASTRODUODENOSCOPY performed by Rosario Marroquin MD at [place] [address]  08/12/2016    left kidney, Dr. Nicholson Median N/A 10/20/2020    EGD ESOPHAGOGASTRODUODENOSCOPY performed by Rosario [facility]quin MD at [facility]    URETER STENT PLACEMENT  01/2019     Social History     Socioeconomic History    Marital status:      Spouse name: Not on file    Number of children: Not on file    Years of education: Not on file    Highest education level: Not on file   Occupational History    Not on file   Social Needs    Financial resource strain: Not on file    Food insecurity     Worry: Not on file     Inability: Not on file    Transportation needs     Medical: Not on file     Non-medical: Not on file   Tobacco Use    Smoking status: Never Smoker    Smokeless tobacco: Never Used   Substance and Sexual Activity    Alcohol use: Yes     Comment: Rarely beer    Drug use: No    Sexual activity: Not on file   Lifestyle    Physical activity     Days per week: Not on file     Minutes per session: Not on file    Stress: Not on file   Relationships    Social connections     Talks on phone: Not on file     Gets together: Not on file     Attends Quaker service: Not on file     Active member of club or organization: Not on file     Attends meetings of clubs or organizations: Not on file     Relationship status: Not on file    Intimate partner violence     Fear of current or ex partner: Not on file     Emotionally abused: Not on file     Physically abused: Not on file     Forced sexual activity: Not on file   Other Topics Concern    Not on file   Social History Narrative    Not on file     Family History   Problem Relation Age of Onset    Heart Attack Father     Heart Attack Brother      Allergies:  Morphine    Review of Systems    Objective:    Pulse 52   Temp 96.3 °F (35.7 °C) (Temporal)   Ht 5' 9\" (1.753 m)   Wt 160 lb (72.6 kg)   SpO2 94%   BMI 23.63 kg/m²     Physical Exam  On exam his incisions are healing well. There was a minimal serous drainage from his midline incision when I probed the wound. There are no signs of infection. Half of the staples were removed.        Assessment/Plan: Diagnosis Orders   1. Colonic mass       I answered questions regarding histology. I believe he can go back on his MiraLAX which he was taking preoperatively if that assists with bowel function. He can return back to see me in the office on Friday to remove the remainder of his staples and ensure that his incision is healing well without any further evidence of seroma. Please note this report has beenpartially produced using speech recognition software and may cause contain errors related to that system including grammar, punctuation and spelling as well as words and phrases that may seem inappropriate.  If there arequestions or concerns please feel free to contact me to clarify

## 2020-11-18 LAB
BASOPHILS ABSOLUTE: ABNORMAL
BASOPHILS RELATIVE PERCENT: ABNORMAL
BUN BLDV-MCNC: 21 MG/DL
CALCIUM SERPL-MCNC: 8.3 MG/DL
CHLORIDE BLD-SCNC: 107 MMOL/L
CO2: 24 MMOL/L
CREAT SERPL-MCNC: 1.33 MG/DL
EOSINOPHILS ABSOLUTE: ABNORMAL
EOSINOPHILS RELATIVE PERCENT: ABNORMAL
GFR CALCULATED: NORMAL
GLUCOSE BLD-MCNC: 121 MG/DL
HCT VFR BLD CALC: 24.9 % (ref 41–53)
HEMOGLOBIN: 7.7 G/DL (ref 13.5–17.5)
LYMPHOCYTES ABSOLUTE: ABNORMAL
LYMPHOCYTES RELATIVE PERCENT: ABNORMAL
MCH RBC QN AUTO: 29.4 PG
MCHC RBC AUTO-ENTMCNC: 30.9 G/DL
MCV RBC AUTO: 95 FL
MONOCYTES ABSOLUTE: ABNORMAL
MONOCYTES RELATIVE PERCENT: ABNORMAL
NEUTROPHILS ABSOLUTE: ABNORMAL
NEUTROPHILS RELATIVE PERCENT: ABNORMAL
PLATELET # BLD: 328 K/ΜL
PMV BLD AUTO: 11.3 FL
POTASSIUM SERPL-SCNC: 4.9 MMOL/L
RBC # BLD: 2.62 10^6/ΜL
SODIUM BLD-SCNC: 140 MMOL/L
WBC # BLD: 5.75 10^3/ML

## 2020-11-19 ENCOUNTER — OFFICE VISIT (OUTPATIENT)
Dept: SURGERY | Age: 85
End: 2020-11-19

## 2020-11-19 ENCOUNTER — OFFICE VISIT (OUTPATIENT)
Dept: CARDIOLOGY CLINIC | Age: 85
End: 2020-11-19
Payer: MEDICARE

## 2020-11-19 VITALS — SYSTOLIC BLOOD PRESSURE: 124 MMHG | HEART RATE: 132 BPM | OXYGEN SATURATION: 98 % | DIASTOLIC BLOOD PRESSURE: 72 MMHG

## 2020-11-19 VITALS
BODY MASS INDEX: 23.7 KG/M2 | WEIGHT: 160 LBS | TEMPERATURE: 97.4 F | OXYGEN SATURATION: 94 % | HEART RATE: 114 BPM | HEIGHT: 69 IN

## 2020-11-19 PROCEDURE — 93000 ELECTROCARDIOGRAM COMPLETE: CPT | Performed by: INTERNAL MEDICINE

## 2020-11-19 PROCEDURE — 99213 OFFICE O/P EST LOW 20 MIN: CPT | Performed by: INTERNAL MEDICINE

## 2020-11-19 PROCEDURE — 99024 POSTOP FOLLOW-UP VISIT: CPT | Performed by: COLON & RECTAL SURGERY

## 2020-11-19 NOTE — PROGRESS NOTES
[place] CARDIOLOGY OFFICE FOLLOW-UP      Patient: Leopold Georgis  YOB: 1934  MRN: [id number]    Chief Complaint:  Chief Complaint   Patient presents with    Shortness of Breath    Tachycardia       Subjective/HPI    The patient is followed in the cardiology office chronically for the following problems: CAD, ICM, ICD, EF <35%, prior CABG, mesenteric ischemia, recent GI surgery    The last encounter focused on the following: followup    Testing/hospitalizations/procedures performed since last encounter: patient in [facility] for GI condition, s/p bowel resection    Since the last encounter, the patient has been getting more short of breath. HR is elevated today 130s afib. Feels weak, tired.       EKG today is afib RVR LBBB    Past Medical History:   Diagnosis Date    Abnormal finding on EKG 6/13/2016    Atrial fibrillation ([facility] [address] 1/22/2018    BPH (benign prostatic hypertrophy)     Cancer (HCC)     kidney - removed Left kidney    Colon polyps     Congestive heart failure ([address] 1/24/2019    GERD (gastroesophageal reflux disease)     Hyperlipidemia     Hypertension     Left bundle branch block 6/13/2016    Osteoarthritis     Sinus bradycardia on ECG 6/13/2016       Past Surgical History:   Procedure Laterality Date    ANGIOPLASTY  05/08/2018    percutaneous transluminal angioplasty of the SMA with stent implantation, [place] [address] [facility] COLONOSCOPY  2016    COLONOSCOPY N/A 10/20/2020    COLONOSCOPY DIAGNOSTIC performed by Jigar Car MD at [facility] [address]  01/25/2019    DR Aida Montanez 35 N/A 11/4/2020    LAPAROSCOPIC RIGHT COLECTOMY performed by Jami Hauser MD at [address] [facility] ESOPHAGOGASTRODUODENOSCOPY TRANSORAL DIAGNOSTIC N/A 6/5/2018    EGD ESOPHAGOGASTRODUODENOSCOPY performed by Jigar Car MD at [facility] [address]  08/12/2016    left kidney, Dr. Edis Veliz [facility]    UPPER GASTROINTESTINAL ENDOSCOPY N/A 10/20/2020    EGD ESOPHAGOGASTRODUODENOSCOPY performed by Christiano Qureshi MD at [place] [address]  01/2019       Family History   Problem Relation Age of Onset    Heart Attack Father     Heart Attack Brother        Social History     Socioeconomic History    Marital status:       Spouse name: Not on file    Number of children: Not on file    Years of education: Not on file    Highest education level: Not on file   Occupational History    Not on file   Social Needs    Financial resource strain: Not on file    Food insecurity     Worry: Not on file     Inability: Not on file    Transportation needs     Medical: Not on file     Non-medical: Not on file   Tobacco Use    Smoking status: Never Smoker    Smokeless tobacco: Never Used   Substance and Sexual Activity    Alcohol use: Yes     Comment: Rarely beer    Drug use: No    Sexual activity: Not on file   Lifestyle    Physical activity     Days per week: Not on file     Minutes per session: Not on file    Stress: Not on file   Relationships    Social connections     Talks on phone: Not on file     Gets together: Not on file     Attends Catholic service: Not on file     Active member of club or organization: Not on file     Attends meetings of clubs or organizations: Not on file     Relationship status: Not on file    Intimate partner violence     Fear of current or ex partner: Not on file     Emotionally abused: Not on file     Physically abused: Not on file     Forced sexual activity: Not on file   Other Topics Concern    Not on file   Social History Narrative    Not on file       Allergies   Allergen Reactions    Morphine      Other reaction(s): Delirium       Current Outpatient Medications   Medication Sig Dispense Refill    acetaminophen (TYLENOL) 325 MG tablet Take 650 mg by mouth every 4 hours as needed for Pain      ipratropium-albuterol (DUONEB) 0.5-2.5 (3) MG/3ML SOLN nebulizer solution Inhale 3 mLs into the lungs every 4 hours as needed for Shortness of Breath 360 mL 1    ibuprofen (ADVIL;MOTRIN) 600 MG tablet Take 1 tablet by mouth 4 times daily as needed for Pain 40 tablet 0    omeprazole (PRILOSEC) 40 MG delayed release capsule Take 1 capsule by mouth every morning (before breakfast) 90 capsule 1    carvedilol (COREG) 3.125 MG tablet Take 1 tablet by mouth 2 times daily 60 tablet 3    midodrine (PROAMATINE) 2.5 MG tablet Take 1 tablet by mouth 3 times daily 90 tablet 3    apixaban (ELIQUIS) 2.5 MG TABS tablet Take 2.5 mg by mouth 2 times daily      tamsulosin (FLOMAX) 0.4 MG capsule Take 0.4 mg by mouth daily      simvastatin (ZOCOR) 20 MG tablet Take 1 tablet by mouth nightly 90 tablet 0    Multiple Vitamins-Minerals (EYE VITAMINS) CAPS Take 1 capsule by mouth daily      ondansetron (ZOFRAN) 4 MG tablet TAKE 1 TO 2 TABLETS EVERY 4 TO 6 HOURS AS NEEDED FOR NAUSEA AND VOMITING 30 tablet 1    latanoprost (XALATAN) 0.005 % ophthalmic solution Place 1 drop into both eyes nightly       No current facility-administered medications for this visit. Review of Systems:   Review of Systems   Constitutional: Negative for activity change and appetite change. HENT: Negative for congestion. Respiratory: Negative for apnea, choking and chest tightness. Cardiovascular: Negative for chest pain. Gastrointestinal: Negative for abdominal distention and abdominal pain. Endocrine: Negative for cold intolerance and heat intolerance. Genitourinary: Negative for dysuria and enuresis. Musculoskeletal: Negative for arthralgias and back pain. Skin: Negative for color change. Allergic/Immunologic: Negative. Neurological: Negative for dizziness, seizures, syncope and light-headedness. Psychiatric/Behavioral: Negative for agitation, behavioral problems and confusion.        Physical Examination:    /72 (Site: Left Upper Arm, Position: Sitting, Cuff 21 11/18/2020    CREATININE 1.33 11/18/2020    GFRAA >60.0 11/10/2020    AGRATIO 1.3 03/05/2019    LABGLOM 54.2 11/10/2020    GLUCOSE 121 11/18/2020    GLUCOSE 109 02/28/2020    PROT 5.5 11/09/2020    LABALBU 3.1 11/09/2020    LABALBU 3.1 03/05/2019    CALCIUM 8.3 11/18/2020    BILITOT 0.3 11/09/2020    ALKPHOS 51 11/09/2020    AST 15 11/09/2020    ALT 15 11/09/2020     BMP:    Lab Results   Component Value Date     11/18/2020    K 4.9 11/18/2020    K 4.3 01/25/2018     11/18/2020    CO2 24 11/18/2020    BUN 21 11/18/2020    LABALBU 3.1 11/09/2020    LABALBU 3.1 03/05/2019    CREATININE 1.33 11/18/2020    CALCIUM 8.3 11/18/2020    GFRAA >60.0 11/10/2020    LABGLOM 54.2 11/10/2020    GLUCOSE 121 11/18/2020    GLUCOSE 109 02/28/2020     Magnesium:    Lab Results   Component Value Date    MG 2.2 03/07/2019     TSH:No results found for: TSHFT4, TSH    Patient Active Problem List   Diagnosis    Hypertension    Benign prostatic hyperplasia    GERD (gastroesophageal reflux disease)    Osteoarthritis    Type 2 diabetes mellitus without complication (HCC)    Hyperlipidemia    Bladder cancer (HCC)    Sinus bradycardia on ECG    Left bundle branch block    Abnormal finding on EKG    Primary bladder malignant neoplasm (HCC)    Hypogonadism in male    Atrial fibrillation (HCC)    Abdominal pain    Mesenteric ischemia (HCC)    Epigastric abdominal pain    Nausea    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    Urothelial carcinoma (HCC)    Chronic kidney disease, stage III (moderate)    Congestive heart failure (HCC)    Colon cancer (HCC)    Glaucoma    Cardiomyopathy (HCC)       Medications:  Current Outpatient Medications   Medication Sig Dispense Refill    acetaminophen (TYLENOL) 325 MG tablet Take 650 mg by mouth every 4 hours as needed for Pain      ipratropium-albuterol (DUONEB) 0.5-2.5 (3) MG/3ML SOLN nebulizer solution Inhale 3 mLs into the lungs every 4 hours as needed for Shortness of Breath 360 mL 1    ibuprofen (ADVIL;MOTRIN) 600 MG tablet Take 1 tablet by mouth 4 times daily as needed for Pain 40 tablet 0    omeprazole (PRILOSEC) 40 MG delayed release capsule Take 1 capsule by mouth every morning (before breakfast) 90 capsule 1    carvedilol (COREG) 3.125 MG tablet Take 1 tablet by mouth 2 times daily 60 tablet 3    midodrine (PROAMATINE) 2.5 MG tablet Take 1 tablet by mouth 3 times daily 90 tablet 3    apixaban (ELIQUIS) 2.5 MG TABS tablet Take 2.5 mg by mouth 2 times daily      tamsulosin (FLOMAX) 0.4 MG capsule Take 0.4 mg by mouth daily      simvastatin (ZOCOR) 20 MG tablet Take 1 tablet by mouth nightly 90 tablet 0    Multiple Vitamins-Minerals (EYE VITAMINS) CAPS Take 1 capsule by mouth daily      ondansetron (ZOFRAN) 4 MG tablet TAKE 1 TO 2 TABLETS EVERY 4 TO 6 HOURS AS NEEDED FOR NAUSEA AND VOMITING 30 tablet 1    latanoprost (XALATAN) 0.005 % ophthalmic solution Place 1 drop into both eyes nightly       No current facility-administered medications for this visit. Assessment/Plan:    1. SOB (shortness of breath)    - EKG 12 Lead    2. Tachycardia    - EKG 12 Lead     Sent patient to [facility]. Called ahead. Counseling: the patient was counseled regarding diet, exercise, weight control and heart healthy lifestyle. No follow-ups on file. Electronically signed by   Sunshine Pfeiffer.  Dinora Thomas MD [facility] Director of Cardiology Services and Cardiac Catheterization Laboratory  [facility], [place]    on 11/19/2020 at 1:55 PM

## 2020-11-19 NOTE — PROGRESS NOTES
Subjective:      Patient ID: Joan Cruz is a 80 y.o. male who presents for:  Chief Complaint   Patient presents with    Post-Op Check       He returns to the office in follow-up. He still had some serous drainage from the portion of his incision but it is improving. He has some lower abdominal cramping at times but is otherwise eating well and moving his bowels without issues. Past Medical History:   Diagnosis Date    Abnormal finding on EKG 6/13/2016    Atrial fibrillation (HCC) 1/22/2018    BPH (benign prostatic hypertrophy)     Cancer (HCC)     kidney - removed Left kidney    Colon polyps     Congestive heart failure ([address] 1/24/2019    GERD (gastroesophageal reflux disease)     Hyperlipidemia     Hypertension     Left bundle branch block 6/13/2016    Osteoarthritis     Sinus bradycardia on ECG 6/13/2016     Past Surgical History:   Procedure Laterality Date    ANGIOPLASTY  05/08/2018    percutaneous transluminal angioplasty of the SMA with stent implantation, [place] [address] [facility] COLONOSCOPY  2016    COLONOSCOPY N/A 10/20/2020    COLONOSCOPY DIAGNOSTIC performed by Camilla Pineda MD at [facility] [address]  01/25/2019    DR Aida Montanez 35 N/A 11/4/2020    LAPAROSCOPIC RIGHT COLECTOMY performed by Eda Trevizo MD at [address] [facility] ESOPHAGOGASTRODUODENOSCOPY TRANSORAL DIAGNOSTIC N/A 6/5/2018    EGD ESOPHAGOGASTRODUODENOSCOPY performed by Camilla Pineda MD at [facility] [address]  08/12/2016    left kidney, Dr. Stacie Goldsmith N/A 10/20/2020    EGD ESOPHAGOGASTRODUODENOSCOPY performed by Camilla Pineda MD at [place] [address]  01/2019     Social History     Socioeconomic History    Marital status:       Spouse name: Not on file    Number of children: Not on file    Years of education: Not on file    Highest education level: Not on file   Occupational History    Not on file   Social Needs    Financial resource strain: Not on file    Food insecurity     Worry: Not on file     Inability: Not on file    Transportation needs     Medical: Not on file     Non-medical: Not on file   Tobacco Use    Smoking status: Never Smoker    Smokeless tobacco: Never Used   Substance and Sexual Activity    Alcohol use: Yes     Comment: Rarely beer    Drug use: No    Sexual activity: Not on file   Lifestyle    Physical activity     Days per week: Not on file     Minutes per session: Not on file    Stress: Not on file   Relationships    Social connections     Talks on phone: Not on file     Gets together: Not on file     Attends Scientology service: Not on file     Active member of club or organization: Not on file     Attends meetings of clubs or organizations: Not on file     Relationship status: Not on file    Intimate partner violence     Fear of current or ex partner: Not on file     Emotionally abused: Not on file     Physically abused: Not on file     Forced sexual activity: Not on file   Other Topics Concern    Not on file   Social History Narrative    Not on file     Family History   Problem Relation Age of Onset    Heart Attack Father     Heart Attack Brother      Allergies:  Morphine    Review of Systems    Objective:    Pulse 114   Temp 97.4 °F (36.3 °C) (Temporal)   Ht 5' 9\" (1.753 m)   Wt 160 lb (72.6 kg)   SpO2 94%   BMI 23.63 kg/m²     Physical Exam  On exam his incisions healing well there is a small separation near the lower portion that has some serous drainage. The remainder of his staples were removed. Steri-Strips were placed for epithelial support. His abdomen soft and nondistended. Assessment/Plan:          Diagnosis Orders   1. Colonic mass       He will return back to see me in the office in 2 weeks for resolution of his incisional issues.     If there are any problems or questions that arise prior to that time, I asked him to call. Please note this report has beenpartially produced using speech recognition software and may cause contain errors related to that system including grammar, punctuation and spelling as well as words and phrases that may seem inappropriate.  If there arequestions or concerns please feel free to contact me to clarify

## 2020-11-23 LAB
ANION GAP SERPL CALCULATED.3IONS-SCNC: 12 MMOL/L (ref 10–20)
BICARBONATE: 30 MMOL/L (ref 21–32)
CALCIUM SERPL-MCNC: 8.4 MG/DL (ref 8.6–10.3)
CHLORIDE BLD-SCNC: 101 MMOL/L (ref 98–107)
CREAT SERPL-MCNC: 1.44 MG/DL (ref 0.5–1.3)
GFR AFRICAN AMERICAN: 56 ML/MIN/1.73M2
GFR NON-AFRICAN AMERICAN: 46 ML/MIN/1.73M2
GLUCOSE: 107 MG/DL (ref 74–99)
POTASSIUM SERPL-SCNC: 3.9 MMOL/L (ref 3.5–5.3)
SODIUM BLD-SCNC: 139 MMOL/L (ref 136–145)
UREA NITROGEN: 25 MG/DL (ref 6–23)

## 2020-12-04 ENCOUNTER — OFFICE VISIT (OUTPATIENT)
Dept: SURGERY | Age: 85
End: 2020-12-04

## 2020-12-04 VITALS
HEIGHT: 69 IN | TEMPERATURE: 96.2 F | BODY MASS INDEX: 23.7 KG/M2 | WEIGHT: 160 LBS | OXYGEN SATURATION: 97 % | HEART RATE: 87 BPM

## 2020-12-04 PROCEDURE — 99024 POSTOP FOLLOW-UP VISIT: CPT | Performed by: COLON & RECTAL SURGERY

## 2020-12-04 NOTE — PROGRESS NOTES
of children: Not on file    Years of education: Not on file    Highest education level: Not on file   Occupational History    Not on file   Social Needs    Financial resource strain: Not on file    Food insecurity     Worry: Not on file     Inability: Not on file    Transportation needs     Medical: Not on file     Non-medical: Not on file   Tobacco Use    Smoking status: Never Smoker    Smokeless tobacco: Never Used   Substance and Sexual Activity    Alcohol use: Yes     Comment: Rarely beer    Drug use: No    Sexual activity: Not on file   Lifestyle    Physical activity     Days per week: Not on file     Minutes per session: Not on file    Stress: Not on file   Relationships    Social connections     Talks on phone: Not on file     Gets together: Not on file     Attends Spiritism service: Not on file     Active member of club or organization: Not on file     Attends meetings of clubs or organizations: Not on file     Relationship status: Not on file    Intimate partner violence     Fear of current or ex partner: Not on file     Emotionally abused: Not on file     Physically abused: Not on file     Forced sexual activity: Not on file   Other Topics Concern    Not on file   Social History Narrative    Not on file     Family History   Problem Relation Age of Onset    Heart Attack Father     Heart Attack Brother      Allergies:  Morphine    Review of Systems    Objective:    Pulse 87   Temp 96.2 °F (35.7 °C) (Temporal)   Ht 5' 9\" (1.753 m)   Wt 160 lb (72.6 kg)   SpO2 97%   BMI 23.63 kg/m²     Physical Exam  On exam his abdomen soft nontender nondistended. His midline incision has healed completely. He has a direct right inguinal hernia on exam.       Assessment/Plan:          Diagnosis Orders   1. Colonic mass     2.  Malignant neoplasm of hepatic flexure ([facility] [address]  LAURA - Emerald Gallegos DO, Oncology, [facility]     I have referred him to see the oncology group regarding any additional treatment. I will see him back in the office in 4 to 6 weeks if the right groin problem does not improve. I would like to hold off on repairing any inguinal hernia so close to his larger operation. If he has any problems or questions that arise, I asked him to call. Please note this report has beenpartially produced using speech recognition software and may cause contain errors related to that system including grammar, punctuation and spelling as well as words and phrases that may seem inappropriate.  If there arequestions or concerns please feel free to contact me to clarify

## 2020-12-08 ENCOUNTER — VIRTUAL VISIT (OUTPATIENT)
Dept: CARDIOLOGY CLINIC | Age: 85
End: 2020-12-08
Payer: MEDICARE

## 2020-12-08 PROCEDURE — 99442 PR PHYS/QHP TELEPHONE EVALUATION 11-20 MIN: CPT | Performed by: INTERNAL MEDICINE

## 2020-12-08 RX ORDER — FUROSEMIDE 40 MG/1
20 TABLET ORAL DAILY PRN
COMMUNITY
End: 2021-01-14

## 2020-12-08 ASSESSMENT — ENCOUNTER SYMPTOMS
EYES NEGATIVE: 1
SHORTNESS OF BREATH: 1
GASTROINTESTINAL NEGATIVE: 1
ALLERGIC/IMMUNOLOGIC NEGATIVE: 1
WHEEZING: 0
VOMITING: 0
ABDOMINAL PAIN: 0
NAUSEA: 0

## 2020-12-08 NOTE — PROGRESS NOTES
Historical Provider, MD   midodrine (PROAMATINE) 2.5 MG tablet Take 1 tablet by mouth 3 times daily  FREDY Wilkes   acetaminophen (TYLENOL) 325 MG tablet Take 650 mg by mouth every 4 hours as needed for Pain  Historical Provider, MD   ipratropium-albuterol (DUONEB) 0.5-2.5 (3) MG/3ML SOLN nebulizer solution Inhale 3 mLs into the lungs every 4 hours as needed for Shortness of Breath  Marlon Rivera MD   ibuprofen (ADVIL;MOTRIN) 600 MG tablet Take 1 tablet by mouth 4 times daily as needed for Pain  Jami Hauser MD   omeprazole (PRILOSEC) 40 MG delayed release capsule Take 1 capsule by mouth every morning (before breakfast)  MOSHE Giron - CNP   carvedilol (COREG) 3.125 MG tablet Take 1 tablet by mouth 2 times daily  Clarissa Cadena MD   apixaban (ELIQUIS) 2.5 MG TABS tablet Take 2.5 mg by mouth 2 times daily  Historical Provider, MD   tamsulosin (FLOMAX) 0.4 MG capsule Take 0.4 mg by mouth daily  Historical Provider, MD   simvastatin (ZOCOR) 20 MG tablet Take 1 tablet by mouth nightly  MOSHE Ornelas - CNP   Multiple Vitamins-Minerals (EYE VITAMINS) CAPS Take 1 capsule by mouth daily  Historical Provider, MD   ondansetron (ZOFRAN) 4 MG tablet TAKE 1 TO 2 TABLETS EVERY 4 TO 6 HOURS AS NEEDED FOR NAUSEA AND VOMITING  Fior Clarke MD   latanoprost (XALATAN) 0.005 % ophthalmic solution Place 1 drop into both eyes nightly  Historical Provider, MD       Social History     Tobacco Use    Smoking status: Never Smoker    Smokeless tobacco: Never Used   Substance Use Topics    Alcohol use: Yes     Comment: Rarely beer    Drug use: No        Allergies   Allergen Reactions    Morphine      Other reaction(s): Delirium   ,   Past Medical History:   Diagnosis Date    Abnormal finding on EKG 6/13/2016    Atrial fibrillation ([address] 1/22/2018    BPH (benign prostatic hypertrophy)     Cancer (HCC)     kidney - removed Left kidney    Colon polyps     Congestive heart Due to this being a TeleHealth encounter, evaluation of the following organ systems is limited: Vitals/Constitutional/EENT/Resp/CV/GI//MS/Neuro/Skin/Heme-Lymph-Imm. ASSESSMENT:        Diagnosis Orders   1. Paroxysmal atrial fibrillation (HCC)     2. Ischemic cardiomyopathy  AMB REFERRAL TO HEART FAILURE CLINIC   3. Stage 3 chronic kidney disease, unspecified whether stage 3a or 3b CKD     4. Hyperlipidemia, unspecified hyperlipidemia type     5. Essential hypertension         PLAN:    We will refer to CHF clinic for complete evaluation, is difficult to evaluate patient over the phone as of first visit. Continue with current cardiac medications    Continue with Lasix 20 mg daily and may need to increase upon evaluation. Obtain most recent records from [facility] [place].    Avoid nephrotoxic agents    AICD checks    Check EKG next office visit    The importance of daily weights, dietary sodium restriction, and contact with cardiology if weight is increased more than 3 lbs in any 48 hour period was stressed. The patient has been advised to contact us if they experience progressive SOB, orthopnea, PND or progressive edema. No follow-ups on file. An  electronic signature was used to authenticate this note. --Sarah Timmons DO on 12/8/2020 at 12:02 PM  9}    Pursuant to the emergency declaration under the [facility] waiver authority and the [employer] Act, this Virtual  Visit was conducted, with patient's consent, to reduce the patient's risk of exposure to COVID-19 and provide continuity of care for an established patient. Services were provided through a video synchronous discussion virtually to substitute for in-person clinic visit. Total Virtual Visit time spent:12 min.        Please note this report has been partially produced using speech recognition software and may cause contain errors related to that system including grammar, punctuation and spelling as well as words and phrases that may seem inappropriate. If there are questions or concerns please feel free to contact me to clarify.

## 2020-12-11 NOTE — PROGRESS NOTES
Patient Name: Herbie Morrell  Date: 12/10/2020  YOB: 1934  Medical Record Number: [id number]              History of Present Illness:      Review of Systems    Review of Systems: All 14 review of systems negative other than as stated above    Social History     Tobacco Use    Smoking status: Never Smoker    Smokeless tobacco: Never Used   Substance Use Topics    Alcohol use: Yes     Comment: Rarely beer    Drug use: No         Past Medical History:   Diagnosis Date    Abnormal finding on EKG 6/13/2016    Atrial fibrillation ([facility] 1/22/2018    BPH (benign prostatic hypertrophy)     Cancer (HCC)     kidney - removed Left kidney    Colon polyps     Congestive heart failure ([facility] 1/24/2019    GERD (gastroesophageal reflux disease)     Hyperlipidemia     Hypertension     Left bundle branch block 6/13/2016    Osteoarthritis     Sinus bradycardia on ECG 6/13/2016           Past Surgical History:   Procedure Laterality Date    ANGIOPLASTY  05/08/2018    per[facility]      COLONOSCOPY  2016    COLONOSCOPY N/A 10/20/2020    COLONOSCOPY DIAGNOSTIC performed by Sarah Thomas MD at [place] [address]  01/25/2019    DR Aida Montanez 35 N/A 11/4/2020    LAPAROSCOPIC RIGHT COLECTOMY performed by Tani Mosher MD at [address] [place] [address] ESOPHAGOGASTRODUODENOSCOPY TRANSORAL DIAGNOSTIC N/A 6/5/2018    EGD ESOPHAGOGASTRODUODENOSCOPY performed by Sarah Thomas MD at [place] [address]  08/12/2016    left kidney, Dr. Marquita Thornton N/A 10/20/2020    EGD ESOPHAGOGASTRODUODENOSCOPY performed by Sarah Thomas MD at [place] [address]  01/2019         Current Outpatient Medications on File Prior to Visit   Medication Sig Dispense Refill    ipratropium-albuterol (DUONEB) 0.5-2.5 (3) MG/3ML SOLN nebulizer solution Inhale 3 mLs into the lungs every 4 hours as needed for Shortness of Breath 360 mL 1    ibuprofen (ADVIL;MOTRIN) 600 MG tablet Take 1 tablet by mouth 4 times daily as needed for Pain 40 tablet 0    omeprazole (PRILOSEC) 40 MG delayed release capsule Take 1 capsule by mouth every morning (before breakfast) 90 capsule 1    carvedilol (COREG) 3.125 MG tablet Take 1 tablet by mouth 2 times daily 60 tablet 3    apixaban (ELIQUIS) 2.5 MG TABS tablet Take 2.5 mg by mouth 2 times daily      tamsulosin (FLOMAX) 0.4 MG capsule Take 0.4 mg by mouth daily      simvastatin (ZOCOR) 20 MG tablet Take 1 tablet by mouth nightly 90 tablet 0    Multiple Vitamins-Minerals (EYE VITAMINS) CAPS Take 1 capsule by mouth daily      ondansetron (ZOFRAN) 4 MG tablet TAKE 1 TO 2 TABLETS EVERY 4 TO 6 HOURS AS NEEDED FOR NAUSEA AND VOMITING 30 tablet 1    latanoprost (XALATAN) 0.005 % ophthalmic solution Place 1 drop into both eyes nightly       No current facility-administered medications on file prior to visit. Allergies   Allergen Reactions    Morphine      Other reaction(s): Delirium         Family History   Problem Relation Age of Onset    Heart Attack Father     Heart Attack Brother          Physical Exam:      Physical Exam    There were no vitals taken for this visit.       .   Lab Results   Component Value Date    WBC 5.75 11/18/2020    HGB 7.7 (A) 11/18/2020    HCT 24.9 (A) 11/18/2020    MCV 95 11/18/2020     11/18/2020     Lab Results   Component Value Date     11/23/2020    K 3.9 11/23/2020    K 4.3 01/25/2018     11/23/2020    CO2 24 11/18/2020    BUN 21 11/18/2020    CREATININE 1.44 11/23/2020    GLUCOSE 107 11/23/2020    CALCIUM 8.4 11/23/2020                ASSESSMENT:  Patient Active Problem List   Diagnosis    Hypertension    Benign prostatic hyperplasia    GERD (gastroesophageal reflux disease)    Osteoarthritis  Type 2 diabetes mellitus without complication (HCC)    Hyperlipidemia    Bladder cancer (HCC)    Sinus bradycardia on ECG    Left bundle branch block    Abnormal finding on EKG    Primary bladder malignant neoplasm (HCC)    Hypogonadism in male    Atrial fibrillation (HCC)    Abdominal pain    Mesenteric ischemia (HCC)    Epigastric abdominal pain    Nausea    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

## 2020-12-15 ENCOUNTER — TELEPHONE (OUTPATIENT)
Dept: CARDIOLOGY CLINIC | Age: 85
End: 2020-12-15

## 2020-12-15 NOTE — TELEPHONE ENCOUNTER
NP [employer] ([address]) [phone] is questiong if it is ok for Patient to take only 20mg of LASIX daily. There is no edima,no congestion of the lungs and she is concerned that he may become dehydrated. Is it ok for the patient to take 20mg PRN? - Please advise.   Bisi Cabello (care giver)  to advise [phone]

## 2020-12-23 ENCOUNTER — TELEPHONE (OUTPATIENT)
Dept: CARDIOLOGY CLINIC | Age: 85
End: 2020-12-23

## 2020-12-29 ENCOUNTER — OFFICE VISIT (OUTPATIENT)
Dept: SURGERY | Age: 85
End: 2020-12-29
Payer: MEDICARE

## 2020-12-29 VITALS
HEART RATE: 77 BPM | TEMPERATURE: 96.5 F | HEIGHT: 69 IN | BODY MASS INDEX: 23.7 KG/M2 | WEIGHT: 160 LBS | OXYGEN SATURATION: 98 %

## 2020-12-29 PROCEDURE — 99213 OFFICE O/P EST LOW 20 MIN: CPT | Performed by: COLON & RECTAL SURGERY

## 2020-12-29 ASSESSMENT — ENCOUNTER SYMPTOMS
COUGH: 0
WHEEZING: 0
DIARRHEA: 0
CONSTIPATION: 0
CHEST TIGHTNESS: 0
ABDOMINAL DISTENTION: 0
ABDOMINAL PAIN: 0

## 2020-12-29 NOTE — PROGRESS NOTES
Subjective:      Patient ID: Ariana Amor is a 80 y.o. male who presents for:  Chief Complaint   Patient presents with   Jamir Herrera       Is an 75-year-old male who had a previous laparoscopic assisted right colectomy about 6 weeks ago. I know he has an existing right inguinal hernia which was bothering him a little during last appointment. He is not quite sure why he is at the appointment today. He does mention that he had some abdominal pain a few days ago which has resolved. He has been having some problems with hematuria which is being addressed. His bowels are working. He denies any diarrhea. He has occasional pain in his right groin on activity.       Past Medical History:   Diagnosis Date    Abnormal finding on EKG 6/13/2016    Atrial fibrillation (HCC) 1/22/2018    BPH (benign prostatic hypertrophy)     Cancer (HCC)     kidney - removed Left kidney    Colon polyps     Congestive heart failure ([address] 1/24/2019    GERD (gastroesophageal reflux disease)     Hyperlipidemia     Hypertension     Left bundle branch block 6/13/2016    Osteoarthritis     Sinus bradycardia on ECG 6/13/2016     Past Surgical History:   Procedure Laterality Date    ANGIOPLASTY  05/08/2018    percutaneous transluminal angioplasty of the SMA with stent implantation, [facility] [facility] COLONOSCOPY  2016    COLONOSCOPY N/A 10/20/2020    COLONOSCOPY DIAGNOSTIC performed by Christiano Qureshi MD at [facility]  01/25/2019    DR Aida Montanez 35 N/A 11/4/2020    LAPAROSCOPIC RIGHT COLECTOMY performed by Phyllis Gauthier MD at [address] ESOPHAGOGASTRODUODENOSCOPY TRANSORAL DIAGNOSTIC N/A 6/5/2018    EGD ESOPHAGOGASTRODUODENOSCOPY performed by Christiano Qureshi MD at [place] [address]  08/12/2016    left kidney, Dr. Yas Mix N/A 10/20/2020    EGD ESOPHAGOGASTRODUODENOSCOPY performed by Grisel Klein MD at [place] [address]  01/2019     Social History     Socioeconomic History    Marital status:      Spouse name: Not on file    Number of children: Not on file    Years of education: Not on file    Highest education level: Not on file   Occupational History    Not on file   Social Needs    Financial resource strain: Not on file    Food insecurity     Worry: Not on file     Inability: Not on file    Transportation needs     Medical: Not on file     Non-medical: Not on file   Tobacco Use    Smoking status: Never Smoker    Smokeless tobacco: Never Used   Substance and Sexual Activity    Alcohol use: Yes     Comment: Rarely beer    Drug use: No    Sexual activity: Not on file   Lifestyle    Physical activity     Days per week: Not on file     Minutes per session: Not on file    Stress: Not on file   Relationships    Social connections     Talks on phone: Not on file     Gets together: Not on file     Attends [employer] service: Not on file     Active member of club or organization: Not on file     Attends meetings of clubs or organizations: Not on file     Relationship status: Not on file    Intimate partner violence     Fear of current or ex partner: Not on file     Emotionally abused: Not on file     Physically abused: Not on file     Forced sexual activity: Not on file   Other Topics Concern    Not on file   Social History Narrative    Not on file     Family History   Problem Relation Age of Onset    Heart Attack Father     Heart Attack Brother      Allergies:  Morphine    Review of Systems   Constitutional: Negative for activity change, appetite change and unexpected weight change. Respiratory: Negative for cough, chest tightness and wheezing. Cardiovascular: Negative for chest pain. Gastrointestinal: Negative for abdominal distention, abdominal pain, constipation and diarrhea.    Genitourinary: Positive for hematuria. Musculoskeletal: Positive for arthralgias. Neurological: Negative for dizziness and headaches. Hematological: Does not bruise/bleed easily. Psychiatric/Behavioral: Positive for confusion. Objective:    Pulse 77   Temp 96.5 °F (35.8 °C) (Temporal)   Ht 5' 9\" (1.753 m)   Wt 160 lb (72.6 kg)   SpO2 98%   BMI 23.63 kg/m²     Physical Exam  Constitutional:       General: He is not in acute distress. Appearance: He is well-developed. He is not diaphoretic. HENT:      Head: Normocephalic and atraumatic. Eyes:      Pupils: Pupils are equal, round, and reactive to light. Neck:      Musculoskeletal: Normal range of motion. Cardiovascular:      Rate and Rhythm: Normal rate and regular rhythm. Heart sounds: Normal heart sounds. Pulmonary:      Effort: Pulmonary effort is normal. No respiratory distress. Breath sounds: Normal breath sounds. No wheezing. Abdominal:      General: There is no distension. Palpations: Abdomen is soft. Tenderness: There is no abdominal tenderness. There is no guarding. Hernia: A hernia is present. Comments: His midline incision is well-healed. He has a reducible right inguinal hernia without skin changes. His abdomen is soft and nondistended. Musculoskeletal: Normal range of motion. General: No tenderness. Skin:     General: Skin is warm and dry. Findings: No erythema or rash. Neurological:      Mental Status: He is alert and oriented to person, place, and time. Psychiatric:         Behavior: Behavior normal.         Thought Content: Thought content normal.         Judgment: Judgment normal.              Assessment/Plan:          Diagnosis Orders   1. Inguinal hernia of right side without obstruction or gangrene       I talked again about repairing his inguinal hernia in the future if problems were to worsen.     There is no indication for immediate repair of this occasionally symptomatic inguinal hernia. He is not interested currently in proceeding with additional surgery as well. He is having work-up performed for his hematuria. He will call the office of any problems were to arise in the future. Please note this report has beenpartially produced using speech recognition software and may cause contain errors related to that system including grammar, punctuation and spelling as well as words and phrases that may seem inappropriate.  If there arequestions or concerns please feel free to contact me to clarify

## 2021-01-14 ENCOUNTER — TELEPHONE (OUTPATIENT)
Dept: CARDIOLOGY CLINIC | Age: 86
End: 2021-01-14

## 2021-01-14 ENCOUNTER — OFFICE VISIT (OUTPATIENT)
Dept: CARDIOLOGY CLINIC | Age: 86
End: 2021-01-14
Payer: MEDICARE

## 2021-01-14 VITALS
SYSTOLIC BLOOD PRESSURE: 108 MMHG | HEART RATE: 71 BPM | OXYGEN SATURATION: 98 % | DIASTOLIC BLOOD PRESSURE: 66 MMHG | RESPIRATION RATE: 18 BRPM | BODY MASS INDEX: 22.36 KG/M2 | HEIGHT: 69 IN | WEIGHT: 151 LBS

## 2021-01-14 DIAGNOSIS — I50.22 CHRONIC SYSTOLIC CHF (CONGESTIVE HEART FAILURE), NYHA CLASS 3 (HCC): ICD-10-CM

## 2021-01-14 DIAGNOSIS — I34.0 MITRAL VALVE INSUFFICIENCY, UNSPECIFIED ETIOLOGY: ICD-10-CM

## 2021-01-14 DIAGNOSIS — Z86.79 HISTORY OF ATRIAL FIBRILLATION: ICD-10-CM

## 2021-01-14 DIAGNOSIS — D64.9 ANEMIA, UNSPECIFIED TYPE: ICD-10-CM

## 2021-01-14 DIAGNOSIS — E87.6 HYPOKALEMIA: Primary | ICD-10-CM

## 2021-01-14 DIAGNOSIS — I42.8 NONISCHEMIC CARDIOMYOPATHY (HCC): ICD-10-CM

## 2021-01-14 DIAGNOSIS — E87.5 HYPERKALEMIA: Primary | ICD-10-CM

## 2021-01-14 DIAGNOSIS — N18.9 CHRONIC KIDNEY DISEASE, UNSPECIFIED CKD STAGE: ICD-10-CM

## 2021-01-14 DIAGNOSIS — Z95.810 AICD (AUTOMATIC CARDIOVERTER/DEFIBRILLATOR) PRESENT: ICD-10-CM

## 2021-01-14 LAB
ANION GAP SERPL CALCULATED.3IONS-SCNC: 9 MEQ/L (ref 9–15)
BUN BLDV-MCNC: 22 MG/DL (ref 8–23)
CALCIUM SERPL-MCNC: 8.8 MG/DL (ref 8.5–9.9)
CHLORIDE BLD-SCNC: 103 MEQ/L (ref 95–107)
CO2: 27 MEQ/L (ref 20–31)
CREAT SERPL-MCNC: 1.28 MG/DL (ref 0.7–1.2)
GFR AFRICAN AMERICAN: >60
GFR NON-AFRICAN AMERICAN: 53.2
GLUCOSE BLD-MCNC: 95 MG/DL (ref 70–99)
HCT VFR BLD CALC: 34.6 % (ref 42–52)
HEMOGLOBIN: 11.2 G/DL (ref 14–18)
MCH RBC QN AUTO: 29.6 PG (ref 27–31.3)
MCHC RBC AUTO-ENTMCNC: 32.2 % (ref 33–37)
MCV RBC AUTO: 91.9 FL (ref 80–100)
PDW BLD-RTO: 20.6 % (ref 11.5–14.5)
PLATELET # BLD: 179 K/UL (ref 130–400)
POTASSIUM SERPL-SCNC: 5.1 MEQ/L (ref 3.4–4.9)
PRO-BNP: 6715 PG/ML
RBC # BLD: 3.77 M/UL (ref 4.7–6.1)
SODIUM BLD-SCNC: 139 MEQ/L (ref 135–144)
WBC # BLD: 6.3 K/UL (ref 4.8–10.8)

## 2021-01-14 PROCEDURE — 99214 OFFICE O/P EST MOD 30 MIN: CPT | Performed by: PHYSICIAN ASSISTANT

## 2021-01-14 RX ORDER — FUROSEMIDE 40 MG/1
20 TABLET ORAL
Qty: 60 TABLET | Refills: 0 | Status: SHIPPED | OUTPATIENT
Start: 2021-01-14 | End: 2021-02-02 | Stop reason: DRUGHIGH

## 2021-01-14 RX ORDER — FUROSEMIDE 40 MG/1
40 TABLET ORAL
Qty: 60 TABLET | Refills: 0
Start: 2021-01-14 | End: 2021-01-14 | Stop reason: DRUGHIGH

## 2021-01-14 ASSESSMENT — ENCOUNTER SYMPTOMS
NAUSEA: 0
CHEST TIGHTNESS: 0
BLOOD IN STOOL: 0
ABDOMINAL PAIN: 0
BACK PAIN: 1
SHORTNESS OF BREATH: 0
VOMITING: 1
COLOR CHANGE: 0

## 2021-01-14 NOTE — PROGRESS NOTES
Patient: Frank Block  YOB: 1934  MRN: [id number]    Chief Complaint:  Chief Complaint   Patient presents with    Congestive Heart Failure     SYSTOLIC         Subjective/HPI         1/14/21: Patient here for follow-up of chronic systolic congestive heart failure and nonischemic cardiomyopathy. Was last seen in heart failure clinic in July 2020. In the interim, patient underwent right colectomy in November 2020 with Dr. Jordin Erazo due to right colon cancer. Patient also reports that he received iron transfusions in November 2020 for anemia. He states he is to follow-up with oncology, Dr. Joceline Haque in March. No plan for radiation or chemo at this time per patient. Patient's last CBC in November 2020 showed significant anemia with hemoglobin around 7.7 and no repeat blood work since that time. Has baseline CKD with creatinine in November of 1.44. Patient has a neighbor who helps him with his medications and he is uncertain of exactly what he is taking at this time. Due to hypotension at his last visit, his carvedilol was discontinued however it appears that it was later renewed by Dr. Fozia Duong. Patient denies any new or worsening heart failure symptoms. No shortness of breath or dyspnea on exertion, chest pain, dizziness, lightheadedness, diaphoresis, paroxysmal nocturnal dyspnea, orthopnea, lower extremity edema, syncope, fever or chills. He admits to being sedentary and not doing much during the day. He does complain of fatigue. Patient with history of BiV ICD implant in October 30, 2018 by Dr. Keaton Cervantes. States he has not seen Dr. Keaton Cervantes nor has he had an ICD check in a long time. Will refer patient to [facility] device clinic for routine evaluations. Patient's last cardiac catheterization was on 12/8/2017 with Dr. Clementine Trevino which revealed trivial irregularity of the coronary arteries, normal LVEDP.   Last echocardiogram in January 2019 at [facility] [place]: EF 20 to 25%, moderate mitral valve regurgitation, moderate to severe pulmonary hypertension, moderate TR, mild AR    Reviewed with patient regarding importance of low-sodium diet and fluid restriction. He is likely noncompliant with sodium intake at times but has no sign of volume overload. He only takes his Lasix as needed for weight gain or swelling. Most recent blood work reviewed and documented below. BMP 11/23/2020: Sodium 139, potassium 3.9, chloride 101, total CO2 of 30, BUN 25, creatinine elevated 1.44, GFR low at 46, glucose 107  CBC 11/18/2020: WBC 5.75, hemoglobin low at 7.7, hematocrit low at 24.9, platelets 527    Vital signs stable in office today with blood pressure 108/66, heart rate 71, pulse ox 98% on room air. Weight is 151 pounds compared to 157 pounds at last office visit on 7/21/2020.          7/21/20: Patient is here for follow-up of chronic systolic congestive heart failure. Was initially evaluated by me on 7/9/2020 following referral by Dr. Alfred Briones. Previously had issues with hypotension and was discontinued on the majority of his cardiac medications however during his initial evaluation with me his blood pressure had normalized and he was resumed on low-dose carvedilol and Lasix. 1 week following this initial office visit, he called the office complaining of recurrent hypotension with systolic in the 86T to 69C range and was advised to discontinue his carvedilol and hold Lasix for 3 days. He was also advised ER as he was complaining of significant fatigue and tiredness with inability to get out of bed and lower extremity pain. He presented to [facility] on 7/17/2020 for further evaluation of low blood pressure and weakness complaints. On presentation to ER, he was normotensive with blood pressure of 129/76, heart rate 74, respiratory rate 18, pulse ox of 98%, afebrile with a temperature of 98.2 °F. WBC 6.2, hemoglobin 11.3, hematocrit 33.3, platelets 114. Sodium 139, potassium 3.9, chloride 104, total CO2 24, BUN elevated at 31, creatinine elevated 2.15, GFR low at 29, glucose 117. BNP 1096. Troponin negative. EKG showed sinus rhythm with ventricular rate of 79 bpm with left bundle branch block. Chest x-ray revealed no acute cardiopulmonary process. He was felt to have symptoms of claudication given his complaints of bilateral lower extremity pain and cramping with ambulation and was referred to vascular surgery as outpatient. As patient's ER evaluation was fairly unremarkable, he was subsequently discharged home. Since his discharge from [facility] ER on 7/17/2020, patient has continued to feel unwell with complaints of fatigue and tiredness especially during ambulation. Also he continues to experience pain and cramping in his lower extremities with ambulation. He is extremely confused on what medications he is currently taking and did not bring his medication bottles or list with him to the office today. He states he previously had a friend who is helping him manage his medications however she has been sick recently and unable to do so. Concern is that patient is accidentally taking more medicines than he realizes which is contributing to his hypotension and dizziness and weakness complaints. Blood pressure low in office today at 86/55 on the left and 83/57 on the right. Repeat blood pressure completed manually by me was 100/60 on the right while seated but systolic dropping into the 70s upon standing. Heart rate 83, pulse ox 99% on room air. Weight is 157 pounds compared to 161 pounds at last visit. EKG 7/17/2020 at [facility]: Normal sinus rhythm, 79 bpm, left bundle branch block, T wave inversions in leads II, 3, aVF and poor R wave progression in V3 through V6,  ms      Recent lab work reviewed and documented below.   [facility]: Sodium 139, potassium 3.9, chloride 104, total CO2 24, BUN elevated 31, creatinine elevated 2.15, GFR low at 29, glucose 117  BMP from 7/9/2020: Sodium 134, potassium 4.4, chloride 100, total CO2 25, BUN elevated 24, creatinine elevated 1.36, GFR low at 49.7, glucose 108  proBNP 7/9/2020: 4095        7/9/20 CHF clinic visit #1: This is a very pleasant 80-year-old  male with past medical history significant for chronic systolic congestive heart failure, cardiomyopathy with EF of 30 to 35% per last echocardiogram in 2018, history of AICD placement, history of A. fib on oral anticoagulation with Eliquis, chronic kidney disease with history of left nephrectomy due to renal cancer presents for initial CHF clinic evaluation following referral by Dr. Porsche Sanchez. Patient states that he had been having significant fatigue and tiredness anytime he would try to exert himself and states that his blood pressure had been running low. On office visit with Dr. Marshal Patrick on 6/8/2020, patient's Entresto, Lasix and Spironolactone were discontinued due to low blood pressure with reported home readings of systolic blood pressure in the 70s. He was again seen by Dr. Porsche Sanchez on 6/18/2020 prior to alert urologic procedure and he was okayed to hold his Xarelto prior to this procedure and no other medication adjustments were made at that time. His blood pressure was stable at his office visit on 6/18/2020 with blood pressure of 116/62. He was then evaluated by Dr. Porsche Sanchez again on 7/2/2020 at which time he was referred to the CHF clinic. Patient states that since these medications were discontinued, he has been feeling better overall. He states that the last 2 days have been the best days for him. He denies any significant shortness of breath, chest pain, palpitations, diaphoresis, paroxysmal nocturnal dyspnea, orthopnea, lower extremity edema, syncope, fever or chills.   He continues to experience some fatigue and tiredness with exertion but states that this has overall improved since his blood pressure has improved. He is not currently weighing himself daily at home but has been advised to start doing so. Educated the patient extensively on importance of diet modification including low-sodium diet and fluid restriction. Blood pressure in office today is stable at 141/80. We will plan to resume low-dose Coreg 3.125 mg p.o. twice daily and Lasix 20 mg p.o. daily to further optimize heart failure medications. Over the past month since his previous meds were discontinued he is only been taking Eliquis 2.5 mg p.o. twice daily and Zocor 20 mg p.o. daily. Recent labs reviewed and documented below. BMP from 6/8/2020: Sodium 138, potassium 4.2, chloride 102, total CO2 25, BUN elevated 70, creatinine elevated 1.92, GFR low at 33.4, glucose 115  proBNP 6/8/2020: 2476    Vital signs stable in office today with blood pressure 141/80, heart rate 76, pulse ox 98% on room air. Weight is 161 pounds. Echocardiogram 9/13/18:  Conclusions   Summary   LV is moderately dilated   LVEF is severely depressed with EF 30-35%   E/A flow reversal noted. Suggestive of diastolic dysfunction. Signature   ----------------------------------------------------------------   Electronically signed by Neva Ocasio MD(Interpreting   physician) on 09/13/2018 11:45 AM   ----------------------------------------------------------------   Findings  Left Ventricle  LV is moderately dilated  LVEF is severely depressed with EF 30-35%  E/A flow reversal noted. Suggestive of diastolic dysfunction. Right Ventricle  Normal right ventricular size with preserved RV function. Left Atrium  Moderately dilated left atrium. Right Atrium  Normal right atrial dimension with no evidence of thrombus or mass noted. Mitral Valve  Mitral annular calcification is present. Mild MR  Tricuspid Valve  Normal structure with Mild tricuspid valvular regurgitation noted. Aortic Valve  Aortic valve leaflets are moderately thickened.   Trace AR  Pulmonic Valve  Mild DC  Pericardial Effusion  No evidence of significant pericardial effusion is noted. Pleural Effusion  No evidence of pleural effusion. Aorta \ Miscellaneous  The aorta is within normal limits. Aortic root dimension within normal limits.       PMHx:  -Hx of superior mesenteric artery stenosis s/p PTA/stent in [place]/May 2018  -CMP EF 30-35%  -Hx AICD  -Trivial CAD per [facility] 12/8/17  -CKD  -Renal CA s/p left nephrectomy around 2014  -Dyslipidemia  -Recurrent UTI  -Hx A-fib on [facility] with Eliquis    PSHx:  -Left nephrectomy for CA  -BiV ICD 10/30/18 with Dr. Severa Lyons Hx:  Nonsmoker  Social alcohol  No drugs    Family Hx:  -Father with \"heart problems\"    Allergies   Allergen Reactions    Morphine      Other reaction(s): Delirium       Current Outpatient Medications   Medication Sig Dispense Refill    furosemide (LASIX) 40 MG tablet Take 20 mg by mouth daily as needed       midodrine (PROAMATINE) 2.5 MG tablet Take 1 tablet by mouth 3 times daily 90 tablet 2    acetaminophen (TYLENOL) 325 MG tablet Take 650 mg by mouth every 4 hours as needed for Pain      ipratropium-albuterol (DUONEB) 0.5-2.5 (3) MG/3ML SOLN nebulizer solution Inhale 3 mLs into the lungs every 4 hours as needed for Shortness of Breath 360 mL 1    ibuprofen (ADVIL;MOTRIN) 600 MG tablet Take 1 tablet by mouth 4 times daily as needed for Pain 40 tablet 0    omeprazole (PRILOSEC) 40 MG delayed release capsule Take 1 capsule by mouth every morning (before breakfast) 90 capsule 1    carvedilol (COREG) 3.125 MG tablet Take 1 tablet by mouth 2 times daily 60 tablet 3    apixaban (ELIQUIS) 2.5 MG TABS tablet Take 2.5 mg by mouth 2 times daily      tamsulosin (FLOMAX) 0.4 MG capsule Take 0.4 mg by mouth daily      simvastatin (ZOCOR) 20 MG tablet Take 1 tablet by mouth nightly 90 tablet 0    Multiple Vitamins-Minerals (EYE VITAMINS) CAPS Take 1 capsule by mouth daily      ondansetron (ZOFRAN) 4 MG tablet TAKE 1 TO 2 TABLETS EVERY 4 TO 6 HOURS AS NEEDED FOR NAUSEA AND VOMITING 30 tablet 1    latanoprost (XALATAN) 0.005 % ophthalmic solution Place 1 drop into both eyes nightly       No current facility-administered medications for this visit. Past Medical History:   Diagnosis Date    Abnormal finding on EKG 6/13/2016    Atrial fibrillation (HCC) 1/22/2018    BPH (benign prostatic hypertrophy)     Cancer (HCC)     kidney - removed Left kidney    Colon polyps     Congestive heart failure ([address] 1/24/2019    GERD (gastroesophageal reflux disease)     Hyperlipidemia     Hypertension     Left bundle branch block 6/13/2016    Osteoarthritis     Sinus bradycardia on ECG 6/13/2016       Past Surgical History:   Procedure Laterality Date    ANGIOPLASTY  05/08/2018    percutaneous transluminal angioplasty of the SMA with stent implantation, [place] [facility] COLONOSCOPY  2016    COLONOSCOPY N/A 10/20/2020    COLONOSCOPY DIAGNOSTIC performed by Cecilio Pérez MD at [facility]  01/25/2019    DR Aida [address]anez [address] N/A 11/4/2020    LAPAROSCOPIC RIGHT COLECTOMY performed by Salomón Urias MD at [facility] ESOPHAGOGASTRODUODENOSCOPY TRANSORAL DIAGNOSTIC N/A 6/5/2018    EGD ESOPHAGOGASTRODUODENOSCOPY performed by Cecilio Pérez MD at [facility]  08/12/2016    left kidney, Dr. Mancia Molt N/A 10/20/2020    EGD ESOPHAGOGASTRODUODENOSCOPY performed by Cecilio Pérez MD at [place] [address]  01/2019       Social History     Socioeconomic History    Marital status:       Spouse name: None    Number of children: None    Years of education: None    Highest education level: None   Occupational History    None   Social Needs    Financial resource strain: None    Food insecurity     Worry: None     Inability: None    Transportation needs Medical: None     Non-medical: None   Tobacco Use    Smoking status: Never Smoker    Smokeless tobacco: Never Used   Substance and Sexual Activity    Alcohol use: Yes     Comment: Rarely beer    Drug use: No    Sexual activity: None   Lifestyle    Physical activity     Days per week: None     Minutes per session: None    Stress: None   Relationships    Social connections     Talks on phone: None     Gets together: None     Attends [employer] service: None     Active member of club or organization: None     Attends meetings of clubs or organizations: None     Relationship status: None    Intimate partner violence     Fear of current or ex partner: None     Emotionally abused: None     Physically abused: None     Forced sexual activity: None   Other Topics Concern    None   Social History Narrative    None       Family History   Problem Relation Age of Onset    Heart Attack Father     Heart Attack Brother          Review of Systems:   Review of Systems   Constitutional: Positive for fatigue (with exertion). Negative for activity change, chills, fever and unexpected weight change. HENT: Negative for congestion. No smell--lost years ago after accident   Respiratory: Negative for chest tightness and shortness of breath. Cardiovascular: Positive for palpitations (occasionally with exertion). Negative for chest pain and leg swelling. No orthopnea or PND   Gastrointestinal: Positive for vomiting (1 episode one week ago). Negative for abdominal pain, blood in stool and nausea. Genitourinary: Negative for difficulty urinating, dysuria and hematuria. Musculoskeletal: Positive for back pain (low back pain intermittently). Negative for arthralgias and myalgias. Skin: Negative for color change, pallor and rash. Neurological: Negative for dizziness, syncope and light-headedness. Psychiatric/Behavioral: Negative for agitation and behavioral problems.          Physical Examination:    BP 108/66 (Site: Right Upper Arm, Position: Sitting, Cuff Size: Large Adult)   Pulse 71   Resp 18   Ht 5' 9\" (1.753 m)   Wt 151 lb (68.5 kg)   SpO2 98%   BMI 22.30 kg/m²    Physical Exam  Constitutional:       General: He is not in acute distress. Appearance: Normal appearance. HENT:      Head: Normocephalic and atraumatic. Neck:      Musculoskeletal: Normal range of motion and neck supple. Cardiovascular:      Rate and Rhythm: Normal rate and regular rhythm. Pulmonary:      Effort: Pulmonary effort is normal. No respiratory distress. Breath sounds: Normal breath sounds. No wheezing. Comments: Decreased breath sounds at bases  Abdominal:      General: Bowel sounds are normal.      Palpations: Abdomen is soft. Tenderness: There is no abdominal tenderness. Musculoskeletal: Normal range of motion. Right lower leg: No edema. Left lower leg: No edema. Skin:     General: Skin is warm and dry. Coloration: Skin is pale. Neurological:      General: No focal deficit present. Mental Status: He is alert and oriented to person, place, and time. Cranial Nerves: No cranial nerve deficit.    Psychiatric:         Mood and Affect: Mood normal.         Behavior: Behavior normal.         LABS:  CBC:   Lab Results   Component Value Date    WBC 5.75 11/18/2020    RBC 2.62 11/18/2020    RBC 2.89 03/08/2019    HGB 7.7 11/18/2020    HCT 24.9 11/18/2020    MCV 95 11/18/2020    MCH 29.4 11/18/2020    MCHC 30.9 11/18/2020    RDW 14.4 11/10/2020     11/18/2020    MPV 11.3 11/18/2020     Lipids:  Lab Results   Component Value Date    CHOL 164 01/02/2019    CHOL 143 06/29/2018    CHOL 136 04/19/2018     Lab Results   Component Value Date    TRIG 337 (H) 01/02/2019    TRIG 160 06/29/2018    TRIG 326 (H) 04/19/2018     Lab Results   Component Value Date    HDL 34 (L) 01/02/2019    HDL 32 (L) 06/29/2018    HDL 28 (L) 04/19/2018     Lab Results   Component Value Date    LDLCALC 63 01/02/2019    LDLCALC 79 06/29/2018    LDLCALC 43 04/19/2018     Lab Results   Component Value Date    VLDL 16 09/19/2017     Lab Results   Component Value Date    CHOLHDLRATIO 2.9 09/19/2017    CHOLHDLRATIO 4.7 06/21/2011     CMP:    Lab Results   Component Value Date     11/23/2020    K 3.9 11/23/2020    K 4.3 01/25/2018     11/23/2020    CO2 24 11/18/2020    BUN 21 11/18/2020    CREATININE 1.44 11/23/2020    GFRAA 56 11/23/2020    AGRATIO 1.3 03/05/2019    LABGLOM 46 11/23/2020    GLUCOSE 107 11/23/2020    PROT 5.5 11/09/2020    LABALBU 3.1 11/09/2020    LABALBU 3.1 03/05/2019    CALCIUM 8.4 11/23/2020    BILITOT 0.3 11/09/2020    ALKPHOS 51 11/09/2020    AST 15 11/09/2020    ALT 15 11/09/2020     BMP:    Lab Results   Component Value Date     11/23/2020    K 3.9 11/23/2020    K 4.3 01/25/2018     11/23/2020    CO2 24 11/18/2020    BUN 21 11/18/2020    LABALBU 3.1 11/09/2020    LABALBU 3.1 03/05/2019    CREATININE 1.44 11/23/2020    CALCIUM 8.4 11/23/2020    GFRAA 56 11/23/2020    LABGLOM 46 11/23/2020    GLUCOSE 107 11/23/2020     Magnesium:    Lab Results   Component Value Date    MG 2.2 03/07/2019     TSH:No results found for: TSHFT4, TSH  .result  No results for input(s): PROBNP in the last 72 hours. No results for input(s): INR in the last 72 hours.             Patient Active Problem List   Diagnosis    Hypertension    Benign prostatic hyperplasia    GERD (gastroesophageal reflux disease)    Osteoarthritis    Type 2 diabetes mellitus without complication (HCC)    Hyperlipidemia    Bladder cancer (HCC)    Sinus bradycardia on ECG    Left bundle branch block    Abnormal finding on EKG    Primary bladder malignant neoplasm ([address]    Hypogonadism in male    Atrial fibrillation (HCC)    Abdominal pain    Mesenteric ischemia (HCC)    Epigastric abdominal pain    Nausea    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    Urothelial carcinoma (HCC)    Chronic kidney disease, stage III (moderate)    Congestive heart failure (HCC)    Colon cancer (HCC)    Glaucoma    Cardiomyopathy ([facility]       Assessment/Plan:     Diagnosis Orders   1. Chronic systolic CHF (congestive heart failure), NYHA class 3 (HCC)  Basic Metabolic Panel    Echo 2D w doppler w color complete    [address] [place] [address]    Brain Natriuretic Peptide    compensated   2. Nonischemic cardiomyopathy [facility])  [address] EF 20-25% per echo at [facility] 1/13/19. Repeat echo ordered   3. Mitral valve insufficiency, unspecified etiology  Echo 2D w doppler w color complete    Repeat echocardiogram to re-evaluate valvular heart disease. Last echo 1/13/19 at [facility]: EF 20-25%, moderate MR, mod-severe PHTN, Mod TR, mild AR   4. AICD (automatic cardioverter/defibrillator) present  [address] [place] [address]    refer to [address] [place] [address] for routine ICD checks   5. History of atrial fibrillation      On OAC with Eliquis 2.5mg PO BID   6. Chronic kidney disease, unspecified CKD stage  Basic Metabolic Panel    Repeat BMP today   7. Anemia, unspecified type  CBC    Repeat CBC. Pt with Hx anemia and iron infusions in 11/2020 and no recent lab on record since 11/2020       -Maximize medical therapy--carvedilol 3.125 mg p.o. twice daily, Lasix 20 mg p.o. daily as needed for weight gain or swelling, Midodrin 2.5 mg p.o. 3 times daily given orthostatic hypotension, continue oral anticoagulation with Eliquis 2.5 mg p.o. twice daily, continue Zocor 20 mg p.o. daily  -Heart failure compensated currently. No sign of volume overload.   -Check BMP today to monitor renal function and electrolytes  -Check CBC today to monitor anemia as hemoglobin low at 7.7 when last checked in November 2020 and patient on oral anticoagulation with Eliquis  -Check BNP today  -Cardiac/<2 gram sodium diet recommended  -1500-1800mL fluid restriction  -Instructed patient to weigh self daily every morning upon waking and keep log book of daily weights. Notify office if gaining more than 3 pounds in 48 hours  -Recommend routine blood pressure monitoring at home  -Advised patient to notify office immediately if experiencing any progressive SOB, orthopnea, PND, LE edema or weight gain  -Educated patient on importance of fluid and salt restriction as well as lifestyle modification  -Echocardiogram ordered to reevaluate LV function and valvular heart disease. Patient's last echocardi[facility] was in January 2019 [facility]: EF 20 to 25%, moderate mitral valve regurgitation, moderate tricuspid regurgitation, mild AI, moderate to severe pulmonary hypertension  -Maintain routine outpatient follow-up with general cardiologist, Dr. Didi Covington  No angina. Cardiac catheterization in 2017 with trivial CAD  Consider further PAD evaluation in future given possible intermittent lower extremity claudication complaints and history of PAD. -Maintain routine outpatient follow-up with PCP  -Refer to [facility] device clinic for routine AICD checks. Patient has not followed with electrophysiology in some time and does not recall when his last interrogation was done  -F/U with CHF clinic in 3 weeks or sooner if needed        Counseling: The importance of daily weights, dietary sodium restriction, and contact with cardiology if weight is increased more than 3 lbs in any 48 hour period was stressed. The patient has been advised to contact us if theyexperience progressive SOB, orthopnea, PND or progressive edema.

## 2021-01-14 NOTE — PATIENT INSTRUCTIONS
-Check daily weight every morning and notify CHF clinic if gaining more than 3 pounds in 2 days    -Limit total daily fluid intake to around 1.5 liter daily (approximately 48 ounces daily)    -Limit total daily sodium intake to less than 2000mg daily    -Check labs (CBC, BMP, BNP) today    -Echocardiogram ordered    -Referred to [facility] device clinic for ICD checks

## 2021-01-15 ENCOUNTER — TELEPHONE (OUTPATIENT)
Dept: CARDIOLOGY CLINIC | Age: 86
End: 2021-01-15

## 2021-01-15 NOTE — TELEPHONE ENCOUNTER
LMOM X2 to verify patient's medications with Sundra Noss, patients neighbor that helps him with his medications.

## 2021-01-18 ENCOUNTER — HOSPITAL ENCOUNTER (OUTPATIENT)
Dept: NON INVASIVE DIAGNOSTICS | Age: 86
Discharge: HOME | End: 2021-01-18
Payer: MEDICARE

## 2021-01-18 DIAGNOSIS — I50.22 CHRONIC SYSTOLIC CHF (CONGESTIVE HEART FAILURE), NYHA CLASS 3 (HCC): ICD-10-CM

## 2021-01-18 DIAGNOSIS — I34.0 MITRAL VALVE INSUFFICIENCY, UNSPECIFIED ETIOLOGY: ICD-10-CM

## 2021-01-18 LAB
LV EF: 13 %
LVEF MODALITY: NORMAL

## 2021-01-18 PROCEDURE — 93306 TTE W/DOPPLER COMPLETE: CPT

## 2021-01-26 ENCOUNTER — HOSPITAL ENCOUNTER (OUTPATIENT)
Dept: CARDIOLOGY | Age: 86
Discharge: HOME | End: 2021-01-26
Payer: MEDICARE

## 2021-01-26 PROCEDURE — 93296 REM INTERROG EVL PM/IDS: CPT

## 2021-02-01 ENCOUNTER — TELEPHONE (OUTPATIENT)
Dept: CARDIOLOGY CLINIC | Age: 86
End: 2021-02-01

## 2021-02-01 DIAGNOSIS — E87.5 HYPERKALEMIA: ICD-10-CM

## 2021-02-01 LAB
ANION GAP SERPL CALCULATED.3IONS-SCNC: 11 MEQ/L (ref 9–15)
BUN BLDV-MCNC: 28 MG/DL (ref 8–23)
CALCIUM SERPL-MCNC: 9.4 MG/DL (ref 8.5–9.9)
CHLORIDE BLD-SCNC: 104 MEQ/L (ref 95–107)
CO2: 23 MEQ/L (ref 20–31)
CREAT SERPL-MCNC: 1.62 MG/DL (ref 0.7–1.2)
GFR AFRICAN AMERICAN: 49.1
GFR NON-AFRICAN AMERICAN: 40.5
GLUCOSE BLD-MCNC: 104 MG/DL (ref 70–99)
POTASSIUM SERPL-SCNC: 4.1 MEQ/L (ref 3.4–4.9)
SODIUM BLD-SCNC: 138 MEQ/L (ref 135–144)

## 2021-02-02 ENCOUNTER — TELEPHONE (OUTPATIENT)
Dept: CARDIOLOGY CLINIC | Age: 86
End: 2021-02-02

## 2021-02-02 DIAGNOSIS — I44.7 LEFT BUNDLE BRANCH BLOCK: ICD-10-CM

## 2021-02-02 DIAGNOSIS — I10 ESSENTIAL HYPERTENSION: Primary | ICD-10-CM

## 2021-02-02 DIAGNOSIS — I48.20 CHRONIC ATRIAL FIBRILLATION (HCC): ICD-10-CM

## 2021-02-02 RX ORDER — FUROSEMIDE 40 MG/1
20 TABLET ORAL DAILY PRN
Qty: 60 TABLET | Refills: 0 | Status: SHIPPED
Start: 2021-02-02 | End: 2021-05-10

## 2021-02-02 NOTE — TELEPHONE ENCOUNTER
Delfina Rodríguez PA-C reviewed lab work and new orders received to reduce lasix to 20mg  PRN for weight gain of 3 pounds or more. BMP and BNP in 3 weeks. Ortega Chau caregiver aware.

## 2021-02-03 ENCOUNTER — TELEPHONE (OUTPATIENT)
Dept: CARDIOLOGY CLINIC | Age: 86
End: 2021-02-03

## 2021-02-03 NOTE — TELEPHONE ENCOUNTER
Patient calling regarding blood pressures. He states he took it and it was 106/69 and HR 94. He states he feels tired, weak and lightheaded. Per [facility]IE[facility] monitor BP BID and we will call tomorrow to check on patient. If feels worse to report to ER. Patient aware.

## 2021-03-01 ENCOUNTER — OFFICE VISIT (OUTPATIENT)
Dept: CARDIOLOGY CLINIC | Age: 86
End: 2021-03-01
Payer: MEDICARE

## 2021-03-01 VITALS
SYSTOLIC BLOOD PRESSURE: 103 MMHG | OXYGEN SATURATION: 98 % | DIASTOLIC BLOOD PRESSURE: 62 MMHG | BODY MASS INDEX: 22.96 KG/M2 | HEIGHT: 69 IN | HEART RATE: 74 BPM | RESPIRATION RATE: 18 BRPM | WEIGHT: 155 LBS

## 2021-03-01 DIAGNOSIS — N18.9 CHRONIC KIDNEY DISEASE, UNSPECIFIED CKD STAGE: ICD-10-CM

## 2021-03-01 DIAGNOSIS — I42.8 NICM (NONISCHEMIC CARDIOMYOPATHY) (HCC): ICD-10-CM

## 2021-03-01 DIAGNOSIS — D64.9 ANEMIA, UNSPECIFIED TYPE: ICD-10-CM

## 2021-03-01 DIAGNOSIS — Z95.810 AICD (AUTOMATIC CARDIOVERTER/DEFIBRILLATOR) PRESENT: ICD-10-CM

## 2021-03-01 DIAGNOSIS — I34.0 MITRAL VALVE INSUFFICIENCY, UNSPECIFIED ETIOLOGY: ICD-10-CM

## 2021-03-01 DIAGNOSIS — I50.22 CHRONIC SYSTOLIC CHF (CONGESTIVE HEART FAILURE), NYHA CLASS 3 (HCC): ICD-10-CM

## 2021-03-01 DIAGNOSIS — R07.89 ATYPICAL CHEST PAIN: ICD-10-CM

## 2021-03-01 DIAGNOSIS — Z86.79 HISTORY OF ATRIAL FIBRILLATION: ICD-10-CM

## 2021-03-01 PROCEDURE — 99214 OFFICE O/P EST MOD 30 MIN: CPT | Performed by: PHYSICIAN ASSISTANT

## 2021-03-01 RX ORDER — HYDRALAZINE HYDROCHLORIDE 10 MG/1
10 TABLET, FILM COATED ORAL 3 TIMES DAILY
COMMUNITY
End: 2021-03-01

## 2021-03-01 RX ORDER — HYDRALAZINE HYDROCHLORIDE 10 MG/1
10 TABLET, FILM COATED ORAL 2 TIMES DAILY
Qty: 90 TABLET | Refills: 3 | Status: SHIPPED | OUTPATIENT
Start: 2021-03-01 | End: 2021-03-01 | Stop reason: HOSPADM

## 2021-03-01 RX ORDER — ISOSORBIDE MONONITRATE 30 MG/1
30 TABLET, EXTENDED RELEASE ORAL DAILY
COMMUNITY
End: 2021-05-10

## 2021-03-01 RX ORDER — METOPROLOL SUCCINATE 50 MG/1
25 TABLET, EXTENDED RELEASE ORAL 2 TIMES DAILY
COMMUNITY

## 2021-03-01 RX ORDER — OMEGA-3/DHA/EPA/FISH OIL 500-1000MG
CAPSULE ORAL DAILY
COMMUNITY

## 2021-03-01 ASSESSMENT — ENCOUNTER SYMPTOMS
ABDOMINAL PAIN: 1
BACK PAIN: 1
CHEST TIGHTNESS: 0
NAUSEA: 1
BLOOD IN STOOL: 0
COLOR CHANGE: 0
VOMITING: 0
SHORTNESS OF BREATH: 1

## 2021-03-01 NOTE — PROGRESS NOTES
fluid restriction. ICD remote check 1/26/21: 9 nonsustained VT episodes, 40 AT/A. fib episodes 4.8% with longest duration of 30 hours since 8/21/2020, good RA pacer capture, RV sensing and RA/RV lead impedances; RA/RV E grams appropriate DDDR pacing; OptiVol fluid index below threshold, battery status okay      Most recent echocardiogram completed on 1/18/2021 revealed worsening LV systolic function with EF now 10 to 15%. To prior EF between 20 to 25% when last checked per echo on 1/15/2019 at [facility]. Echocardiogram 1/18/21:  Conclusions   Summary   Left ventricular ejection fraction is visually estimated at 10-15%. global   hypokinesis. Left ventricular size is severely increased . Restrictive filling pattern. Moderately severe (3-4+) mitral regurgitation is present. ERO=0.2cm , RV   32M   No evidence of mitral valve stenosis. No evidence of aortic valve regurgitation . No evidence of aortic valve stenosis. The left atrium is Mildly dilated. There is moderate tricuspid regurgitation with estimated RVSP of 63 mm Hg. Right ventricular systolic pressure of 63 mm Hg consistent with moderate   to severe pulmonary hypertension. Signature   ----------------------------------------------------------------   Electronically signed by Roro Hoffman DO(Interpreting   physician) on 01/18/2021 06:46 PM    Most recent labs from [facility] reviewed and documented below  BMP on 2/12/2021: Sodium 142, potassium 4.4, chloride 108, total CO2 29, BUN elevated 35, creatinine elevated 1.71, GFR low at 35  CBC 2/12/2021: WBC 4.1, hemoglobin 9.2, platelets 286     Vital signs stable in office today. Blood pressure 93/56 on the right and 103/62 on the left. States he is taken his morning medications already. Heart rate 74 bpm, pulse ox 98% on room air. Weight is 155 pounds compared to 151 pounds at last visit.       **While walking out of exam room, patient complained of feeling very tired and needing to sit down. Blood pressure checked and patient hypotensive with blood pressure 90/50 on the left. After resting for a few minutes, patient symptoms resolved. Will discontinue hydralazine completely given hypotension. **        1/14/21: Patient here for follow-up of chronic systolic congestive heart failure and nonischemic cardiomyopathy. Was last seen in heart failure clinic in July 2020. In the interim, patient underwent right colectomy in November 2020 with Dr. Quincy Chávez due to right colon cancer. Patient also reports that he received iron transfusions in November 2020 for anemia. He states he is to follow-up with oncology, Dr. Gauri Muñiz in March. No plan for radiation or chemo at this time per patient. Patient's last CBC in November 2020 showed significant anemia with hemoglobin around 7.7 and no repeat blood work since that time. Has baseline CKD with creatinine in November of 1.44. Patient has a neighbor who helps him with his medications and he is uncertain of exactly what he is taking at this time. Due to hypotension at his last visit, his carvedilol was discontinued however it appears that it was later renewed by Dr. Reyna Mao. Patient denies any new or worsening heart failure symptoms. No shortness of breath or dyspnea on exertion, chest pain, dizziness, lightheadedness, diaphoresis, paroxysmal nocturnal dyspnea, orthopnea, lower extremity edema, syncope, fever or chills. He admits to being sedentary and not doing much during the day. He does complain of fatigue. Patient with history of BiV ICD implant in October 30, 2018 by Dr. Maebelle Angelucci. States he has not seen Dr. Maebelle Angelucci nor has he had an ICD check in a long time. Will refer patient to [facility] device clinic for routine evaluations. Patient's last cardiac catheterization was on 12/8/2017 with Dr. Ema Albarado which revealed trivial irregularity of the coronary arteries, normal LVEDP.   Last echocardiogram in January 2019 at [facility]: EF 20 to 25%, moderate mitral valve regurgitation, moderate to severe pulmonary hypertension, moderate TR, mild AR    Reviewed with patient regarding importance of low-sodium diet and fluid restriction. He is likely noncompliant with sodium intake at times but has no sign of volume overload. He only takes his Lasix as needed for weight gain or swelling. Most recent blood work reviewed and documented below. BMP 11/23/2020: Sodium 139, potassium 3.9, chloride 101, total CO2 of 30, BUN 25, creatinine elevated 1.44, GFR low at 46, glucose 107  CBC 11/18/2020: WBC 5.75, hemoglobin low at 7.7, hematocrit low at 24.9, platelets 925    Vital signs stable in office today with blood pressure 108/66, heart rate 71, pulse ox 98% on room air. Weight is 151 pounds compared to 157 pounds at last office visit on 7/21/2020.          7/21/20: Patient is here for follow-up of chronic systolic congestive heart failure. Was initially evaluated by me on 7/9/2020 following referral by Dr. Chante Pineda. Previously had issues with hypotension and was discontinued on the majority of his cardiac medications however during his initial evaluation with me his blood pressure had normalized and he was resumed on low-dose carvedilol and Lasix. 1 week following this initial office visit, he called the office complaining of recurrent hypotension with systolic in the 58G to 29K range and was advised to discontinue his carvedilol and hold Lasix for 3 days. He was also advised ER as he was complaining of significant fatigue and tiredness with inability to get out of bed and lower extremity pain. He presented to [facility] on 7[facility]2020 for further evaluation of low blood pressure and weakness complaints. On presentation to ER, he was normotensive with blood pressure of 129/76, heart rate 74, respiratory rate 18, pulse ox of 98%, afebrile with a temperature of 98.2 °F.   WBC 6.2, hemoglobin 11.3, hematocrit 33.3, platelets 087. Sodium 139, potassium 3.9, chloride 104, total CO2 24, BUN elevated at 31, creatinine elevated 2.15, GFR low at 29, glucose 117. BNP 1096. Troponin negative. EKG showed sinus rhythm with ventricular rate of 79 bpm with left bundle branch block. Chest x-ray revealed no acute cardiopulmonary process. He was felt to have symptoms of claudication given his complaints of bilateral lower extremity pain and cramping with ambulation and was referred to vascular surgery as outpatient. As patient's ER evaluation was fairly unremarkable, he was subsequently discharged home. Since his discharge from [facility] ER on 7/17/2020, patient has continued to feel unwell with complaints of fatigue and tiredness especially during ambulation. Also he continues to experience pain and cramping in his lower extremities with ambulation. He is extremely confused on what medications he is currently taking and did not bring his medication bottles or list with him to the office today. He states he previously had a friend who is helping him manage his medications however she has been sick recently and unable to do so. Concern is that patient is accidentally taking more medicines than he realizes which is contributing to his hypotension and dizziness and weakness complaints. Blood pressure low in office today at 86/55 on the left and 83/57 on the right. Repeat blood pressure completed manually by me was 100/60 on the right while seated but systolic dropping into the 70s upon standing. Heart rate 83, pulse ox 99% on room air. Weight is 157 pounds compared to 161 pounds at last visit. [facility]: Normal sinus rhythm, 79 bpm, left bundle branch block, T wave inversions in leads II, 3, aVF and poor R wave progression in V3 through V6,  ms      Recent lab work reviewed and documented below.   [facility] Sodium 139, potassium 3.9, chloride 104, total CO2 24, BUN elevated 31, improved since his blood pressure has improved. He is not currently weighing himself daily at home but has been advised to start doing so. Educated the patient extensively on importance of diet modification including low-sodium diet and fluid restriction. Blood pressure in office today is stable at 141/80. We will plan to resume low-dose Coreg 3.125 mg p.o. twice daily and Lasix 20 mg p.o. daily to further optimize heart failure medications. Over the past month since his previous meds were discontinued he is only been taking Eliquis 2.5 mg p.o. twice daily and Zocor 20 mg p.o. daily. Recent labs reviewed and documented below. BMP from 6/8/2020: Sodium 138, potassium 4.2, chloride 102, total CO2 25, BUN elevated 70, creatinine elevated 1.92, GFR low at 33.4, glucose 115  proBNP 6/8/2020: 2476    Vital signs stable in office today with blood pressure 141/80, heart rate 76, pulse ox 98% on room air. Weight is 161 pounds. Echocardiogram 9/13/18:  Conclusions   Summary   LV is moderately dilated   LVEF is severely depressed with EF 30-35%   E/A flow reversal noted. Suggestive of diastolic dysfunction. Signature   ----------------------------------------------------------------   Electronically signed by Pradeep Rudolph MD(Interpreting   physician) on 09/13/2018 11:45 AM   ----------------------------------------------------------------   Findings  Left Ventricle  LV is moderately dilated  LVEF is severely depressed with EF 30-35%  E/A flow reversal noted. Suggestive of diastolic dysfunction. Right Ventricle  Normal right ventricular size with preserved RV function. Left Atrium  Moderately dilated left atrium. Right Atrium  Normal right atrial dimension with no evidence of thrombus or mass noted. Mitral Valve  Mitral annular calcification is present. Mild MR  Tricuspid Valve  Normal structure with Mild tricuspid valvular regurgitation noted.   Aortic Valve  Aortic valve leaflets are moderately by mouth nightly 90 tablet 0    ondansetron (ZOFRAN) 4 MG tablet TAKE 1 TO 2 TABLETS EVERY 4 TO 6 HOURS AS NEEDED FOR NAUSEA AND VOMITING 30 tablet 1    latanoprost (XALATAN) 0.005 % ophthalmic solution Place 1 drop into both eyes nightly      midodrine (PROAMATINE) 2.5 MG tablet Take 1 tablet by mouth 3 times daily (Patient not taking: Reported on 3/1/2021) 90 tablet 2    carvedilol (COREG) 3.125 MG tablet Take 1 tablet by mouth 2 times daily (Patient not taking: Reported on 3/1/2021) 60 tablet 3    Multiple Vitamins-Minerals (EYE VITAMINS) CAPS Take 1 capsule by mouth daily       No current facility-administered medications for this visit.         Past Medical History:   Diagnosis Date    Abnormal finding on EKG 6/13/2016    Atrial fibrillation (HCC) 1/22/2018    BPH (benign prostatic hypertrophy)     Cancer (HCC)     kidney - removed Left kidney    Colon polyps     Congestive heart failure ([address] 1/24/2019    GERD (gastroesophageal reflux disease)     Hyperlipidemia     Hypertension     Left bundle branch block 6/13/2016    Osteoarthritis     Sinus bradycardia on ECG 6/13/2016       Past Surgical History:   Procedure Laterality Date    ANGIOPLASTY  05/08/2018    percutaneous transluminal angioplasty of the SMA with stent implantation, [place] [facility] COLONOSCOPY  2016    COLONOSCOPY N/A 10/20/2020    COLONOSCOPY DIAGNOSTIC performed by Cecilio Pérez MD at [facility]  01/25/2019    DR Aida Montanez 35 N/A 11/4/2020    LAPAROSCOPIC RIGHT COLECTOMY performed by Salomón Urias MD at [address] [facility] ESOPHAGOGASTRODUODENOSCOPY TRANSORAL DIAGNOSTIC N/A 6/5/2018    EGD ESOPHAGOGASTRODUODENOSCOPY performed by Cecilio Pérez MD at [facility]  08/12/2016    left kidney, Dr. Gavino Manleyt N/A 10/20/2020    EGD ESOPHAGOGASTRODUODENOSCOPY performed by Héctor Herrera MD at [place] [id number]  01/2019       Social History     Socioeconomic History    Marital status:      Spouse name: Not on file    Number of children: Not on file    Years of education: Not on file    Highest education level: Not on file   Occupational History    Not on file   Social Needs    Financial resource strain: Not on file    Food insecurity     Worry: Not on file     Inability: Not on file    Transportation needs     Medical: Not on file     Non-medical: Not on file   Tobacco Use    Smoking status: Never Smoker    Smokeless tobacco: Never Used   Substance and Sexual Activity    Alcohol use: Yes     Comment: Rarely beer    Drug use: No    Sexual activity: Not on file   Lifestyle    Physical activity     Days per week: Not on file     Minutes per session: Not on file    Stress: Not on file   Relationships    Social connections     Talks on phone: Not on file     Gets together: Not on file     Attends [employer] service: Not on file     Active member of club or organization: Not on file     Attends meetings of clubs or organizations: Not on file     Relationship status: Not on file    Intimate partner violence     Fear of current or ex partner: Not on file     Emotionally abused: Not on file     Physically abused: Not on file     Forced sexual activity: Not on file   Other Topics Concern    Not on file   Social History Narrative    Not on file       Family History   Problem Relation Age of Onset    Heart Attack Father     Heart Attack Brother          Review of Systems:   Review of Systems   Constitutional: Positive for fatigue (with exertion). Negative for activity change, chills, fever and unexpected weight change. HENT: Negative for congestion. No smell--lost years ago after accident   Respiratory: Positive for shortness of breath (with minimal exertion). Negative for chest tightness. Cardiovascular: Positive for chest pain (\"pins and needles\" chest pain lasting 30 minutes then resolving yesterday). Negative for palpitations and leg swelling. No orthopnea or PND   Gastrointestinal: Positive for abdominal pain (low abdominal/pubic pain in AM--stabbing pain) and nausea (this AM). Negative for blood in stool and vomiting. Genitourinary: Positive for difficulty urinating (difficulty emptying bladder). Negative for dysuria and hematuria. Musculoskeletal: Positive for back pain (low right sided back pain occasionally). Negative for arthralgias and myalgias. Skin: Negative for color change, pallor and rash. Neurological: Negative for dizziness, syncope and light-headedness. Psychiatric/Behavioral: Negative for agitation and behavioral problems. Physical Examination:    /62 (Site: Left Upper Arm, Position: Sitting, Cuff Size: Small Adult)   Pulse 74   Resp 18   Ht 5' 9\" (1.753 m)   Wt 155 lb (70.3 kg)   SpO2 98%   BMI 22.89 kg/m²    Physical Exam  Constitutional:       General: He is not in acute distress. Appearance: Normal appearance. HENT:      Head: Normocephalic and atraumatic. Neck:      Musculoskeletal: Normal range of motion and neck supple. Cardiovascular:      Rate and Rhythm: Normal rate and regular rhythm. Pulmonary:      Effort: Pulmonary effort is normal. No respiratory distress. Breath sounds: Normal breath sounds. No wheezing, rhonchi or rales. Comments: Decreased breath sounds at bases  Abdominal:      General: Bowel sounds are normal.      Palpations: Abdomen is soft. Tenderness: There is no abdominal tenderness. Musculoskeletal: Normal range of motion. Right lower leg: No edema. Left lower leg: No edema. Skin:     General: Skin is warm and dry. Coloration: Skin is pale. Neurological:      General: No focal deficit present. Mental Status: He is alert and oriented to person, place, and time. 8. Anemia, unspecified type      Hgb 9.2 per CBC on 2/11/21 at [facility] therapy--Toprol XL 50mg PO daily, Imdur 30 mg PO daily, discontinue hydralazine 10mg PO TID (recently added per cardiology during admission to [facility]) Lasix 40 mg p.o. daily as needed for weight gain or swelling, continue oral anticoagulation with Eliquis 2.5 mg p.o. twice daily, continue Zocor 20 mg p.o. daily  -Midodrine discontinued recently per cardiology evaluation while hospitalized at [facility]. Will consider resumption of midodrine in future if recurrent orthostatic hypotension  -Heart failure compensated currently. No sign of volume overload. -Check BMP today  -Check BNP today  -Cardiac/<2 gram sodium diet recommended  -1500-1800mL fluid restriction  -Instructed patient to weigh self daily every morning upon waking and keep log book of daily weights. Notify office if gaining more than 3 pounds in 48 hours  -Recommend routine blood pressure monitoring at home  -Advised patient to notify office immediately if experiencing any progressive SOB, orthopnea, PND, LE edema or weight gain  -Educated patient on importance of fluid and salt restriction as well as lifestyle modification  -Echocardiogram 1/18/2021: Severely reduced LV systolic function with EF 10 to 15%, moderately severe 3-4+ mitral valve regurgitation, moderate TR with RVSP of 63 mmHg consistent with moderate to severe pulmonary hypertension  -Maintain routine outpatient follow-up with general cardiologist, Dr. Karen Solis-- follow-up evaluation in 1 to 2 months  Had atypical chest pain episode yesterday. Has been advised to notify office immediately if any recurrent chest pain episodes. Cardiac catheterization in 2017 with trivial CAD  Consider further PAD evaluation in future given possible intermittent lower extremity claudication complaints and history of PAD.    -Maintain routine outpatient follow-up with PCP  -Maintain outpatient follow-up with electrophysiology and [facility] device clinic  -F/U with CHF clinic in 3 months or sooner if needed        Counseling: The importance of daily weights, dietary sodium restriction, and contact with cardiology if weight is increased more than 3 lbs in any 48 hour period was stressed. The patient has been advised to contact us if theyexperience progressive SOB, orthopnea, PND or progressive edema.

## 2021-03-01 NOTE — PATIENT INSTRUCTIONS
-Check daily weight and notify CHF clinic if gaining more than 3 pounds in 2 days    -Limit total daily fluid inatke to less than 1.5 liter (48 ounces) daily    -Limit total daily sodium intake to less than 2000mg daily    -Check labs (BMP and BNP today)    **Please call urologist regarding abdominal pain and difficulty urinating post procedure**

## 2021-03-02 DIAGNOSIS — I50.22 CHRONIC SYSTOLIC CHF (CONGESTIVE HEART FAILURE), NYHA CLASS 3 (HCC): ICD-10-CM

## 2021-03-02 DIAGNOSIS — I42.8 NICM (NONISCHEMIC CARDIOMYOPATHY) (HCC): ICD-10-CM

## 2021-03-02 LAB
ANION GAP SERPL CALCULATED.3IONS-SCNC: 9 MEQ/L (ref 9–15)
BUN BLDV-MCNC: 27 MG/DL (ref 8–23)
CALCIUM SERPL-MCNC: 9.2 MG/DL (ref 8.5–9.9)
CHLORIDE BLD-SCNC: 104 MEQ/L (ref 95–107)
CO2: 27 MEQ/L (ref 20–31)
CREAT SERPL-MCNC: 1.17 MG/DL (ref 0.7–1.2)
GFR AFRICAN AMERICAN: >60
GFR NON-AFRICAN AMERICAN: 59
GLUCOSE BLD-MCNC: 96 MG/DL (ref 70–99)
POTASSIUM SERPL-SCNC: 4.8 MEQ/L (ref 3.4–4.9)
PRO-BNP: 6130 PG/ML
SODIUM BLD-SCNC: 140 MEQ/L (ref 135–144)

## 2021-03-23 RX ORDER — OMEPRAZOLE 40 MG/1
40 CAPSULE, DELAYED RELEASE ORAL
Qty: 90 CAPSULE | Refills: 1 | Status: SHIPPED | OUTPATIENT
Start: 2021-03-23

## 2021-04-09 ENCOUNTER — OFFICE VISIT (OUTPATIENT)
Dept: CARDIOLOGY CLINIC | Age: 86
End: 2021-04-09
Payer: MEDICARE

## 2021-04-09 VITALS
SYSTOLIC BLOOD PRESSURE: 110 MMHG | WEIGHT: 158 LBS | BODY MASS INDEX: 23.33 KG/M2 | HEART RATE: 82 BPM | DIASTOLIC BLOOD PRESSURE: 70 MMHG

## 2021-04-09 DIAGNOSIS — E78.5 HYPERLIPIDEMIA, UNSPECIFIED HYPERLIPIDEMIA TYPE: ICD-10-CM

## 2021-04-09 DIAGNOSIS — I10 ESSENTIAL HYPERTENSION: ICD-10-CM

## 2021-04-09 DIAGNOSIS — I42.8 NON-ISCHEMIC CARDIOMYOPATHY (HCC): ICD-10-CM

## 2021-04-09 DIAGNOSIS — I48.91 ATRIAL FIBRILLATION, UNSPECIFIED TYPE (HCC): Primary | ICD-10-CM

## 2021-04-09 PROCEDURE — 93000 ELECTROCARDIOGRAM COMPLETE: CPT | Performed by: INTERNAL MEDICINE

## 2021-04-09 PROCEDURE — 99214 OFFICE O/P EST MOD 30 MIN: CPT | Performed by: INTERNAL MEDICINE

## 2021-04-09 ASSESSMENT — ENCOUNTER SYMPTOMS
WHEEZING: 0
NAUSEA: 0
SHORTNESS OF BREATH: 1
ALLERGIC/IMMUNOLOGIC NEGATIVE: 1
GASTROINTESTINAL NEGATIVE: 1
ABDOMINAL PAIN: 0
VOMITING: 0
EYES NEGATIVE: 1

## 2021-04-09 NOTE — PROGRESS NOTES
2021      HPI:    Jonathan Lozoya (:  1934) has requested an audio/video evaluation for the following concern(s):    Patient with history of non ischemic cardiomyopathy ejection fraction 35% status post AICD, hypertension, hyperlipidemia, paroxysmal atrial fibrillation, mesenteric ischemia status post recent resection. Status post hospitalization for acute on chronic decompensated systolic congestive heart failure, class IV, paroxysmal atrial fibrillation with rapid ventricular response. He was treated medically with Cardizem and amiodarone was added. Elizabeth Hoops was discontinued. He is on Eliquis 2.5 mg twice a day. He states he is continue to have shortness of breath. 2021: Patient is follow-up in CHF clinic. He states he is feels fatigued tired in the morning. Patient with history of non ischemic cardiomyopathy ejection fraction of 35% status post AICD, hypertension hyperlipidemia paroxysmal atrial fibrillation. Patient is compliant with his medications. No active CHF symptoms at this time. Pt denies chest pain, dyspnea, dyspnea on exertion, change in exercise capacity,  nausea, vomiting, diarrhea, constipation, motor weakness, insomnia, weight loss, syncope, dizziness, lightheadedness, palpitations, PND, orthopnea, or claudication. Patient with history of cardiac catheterization in 2017 that showed trivial coronary artery disease. Status post 2D echo in 2021 that showed an ejection fraction of 10 to 15%, severely enlarged LV size, restrictive filling pattern, 3-4+ mitral regurgitation, moderate to severe pulmonary tension with an RVSP of 63. Review of Systems   Constitutional: Negative. Negative for chills and fever. HENT: Negative. Eyes: Negative. Respiratory: Positive for shortness of breath. Negative for wheezing. Cardiovascular: Negative for chest pain, palpitations and leg swelling. Gastrointestinal: Negative.   Negative for abdominal pain, nausea and vomiting. Endocrine: Negative. Genitourinary: Negative. Musculoskeletal: Negative. Skin: Negative. Negative for rash. Allergic/Immunologic: Negative. Neurological: Negative for dizziness, weakness and headaches. Hematological: Negative. Psychiatric/Behavioral: Negative. Prior to Visit Medications    Medication Sig Taking?  Authorizing Provider   omeprazole (PRILOSEC) 40 MG delayed release capsule Take 1 capsule by mouth every morning (before breakfast) Yes Mark Bryan MD   isosorbide mononitrate (IMDUR) 30 MG extended release tablet Take 30 mg by mouth daily Yes Historical Provider, MD   Omega-3 Fatty Acids (OMEGA 3 500) 500 MG CAPS Take by mouth daily 1 capsule daily Yes Historical Provider, MD   metoprolol succinate (TOPROL XL) 50 MG extended release tablet Take 50 mg by mouth daily Yes Historical Provider, MD   furosemide (LASIX) 40 MG tablet Take 0.5 tablets by mouth daily as needed FOR WEIGHT GAIN OF 3 POUNDS OR MORE Yes Debbie Boothe   acetaminophen (TYLENOL) 325 MG tablet Take 650 mg by mouth every 4 hours as needed for Pain Yes Historical Provider, MD   ipratropium-albuterol (DUONEB) 0.5-2.5 (3) MG/3ML SOLN nebulizer solution Inhale 3 mLs into the lungs every 4 hours as needed for Shortness of Breath Yes Diane Daily MD   ibuprofen (ADVIL;MOTRIN) 600 MG tablet Take 1 tablet by mouth 4 times daily as needed for Pain Yes Tashia Avila MD   apixaban (ELIQUIS) 2.5 MG TABS tablet Take 2.5 mg by mouth 2 times daily Yes Historical Provider, MD   tamsulosin (FLOMAX) 0.4 MG capsule Take 0.4 mg by mouth daily Yes Historical Provider, MD   simvastatin (ZOCOR) 20 MG tablet Take 1 tablet by mouth nightly Yes Marlin Brito APRN - CNP   Multiple Vitamins-Minerals (EYE VITAMINS) CAPS Take 1 capsule by mouth daily Yes Historical Provider, MD   ondansetron (ZOFRAN) 4 MG tablet TAKE 1 TO 2 TABLETS EVERY 4 TO 6 HOURS AS NEEDED FOR NAUSEA AND VOMITING Yes Linette Márquez MD Brooke   latanoprost (XALATAN) 0.005 % ophthalmic solution Place 1 drop into both eyes nightly Yes Historical Provider, MD       Social History     Tobacco Use    Smoking status: Never Smoker    Smokeless tobacco: Never Used   Substance Use Topics    Alcohol use: Yes     Comment: Rarely beer    Drug use: No        Allergies   Allergen Reactions    Morphine      Other reaction(s): Delirium   ,   Past Medical History:   Diagnosis Date    Abnormal finding on EKG 6/13/2016    Atrial fibrillation ([facility] 1/22/2018    BPH (benign prostatic hypertrophy)     Cancer (HCC)     kidney - removed Left kidney    Colon polyps     Congestive heart failure ([facility] [address] 1/24/2019    GERD (gastroesophageal reflux disease)     Hyperlipidemia     Hypertension     Left bundle branch block 6/13/2016    Non-ischemic cardiomyopathy ([facility] [address] 4/9/2021    Osteoarthritis     Sinus bradycardia on ECG 6/13/2016   ,   Past Surgical History:   Procedure Laterality Date    ANGIOPLASTY  05/08/2018    percutaneous transluminal angioplasty of the SMA with stent implantation, [facility]      COLONOSCOPY  2016    COLONOSCOPY N/A 10/20/2020    COLONOSCOPY DIAGNOSTIC performed by Arlan Goodell, MD at [place] [address]  01/25/2019    DR Aida Montanez 35 N/A 11/4/2020    LAPAROSCOPIC RIGHT COLECTOMY performed by Lexus Morel MD at [address] [place] [address] ESOPHAGOGASTRODUODENOSCOPY TRANSORAL DIAGNOSTIC N/A 6/5/2018    EGD ESOPHAGOGASTRODUODENOSCOPY performed by Arlan Goodell, MD at [facility]  08/12/2016    left kidney, Dr. Brandy Jeong N/A 10/20/2020    EGD ESOPHAGOGASTRODUODENOSCOPY performed by Arlan Goodell, MD at [place] [address]  01/2019   ,   Family History   Problem Relation Age of Onset    Heart Attack Father     Heart Attack Brother ASSESSMENT:        Diagnosis Orders   1. Atrial fibrillation, unspecified type (HCC)  EKG 12 Lead   2. Hyperlipidemia, unspecified hyperlipidemia type     3. Essential hypertension     4. Non-ischemic cardiomyopathy ([facility] [address]     5. History of chronic combined CHF-compensated    PLAN:    Continue with Eliquis 2.5 mg twice a day. Follow-up with CHF clinic    Continue with current cardiac medications    Continue with Lasix 20 mg daily     Avoid nephrotoxic agents    AICD checks    Check EKG next office visit    The importance of daily weights, dietary sodium restriction, and contact with cardiology if weight is increased more than 3 lbs in any 48 hour period was stressed. The patient has been advised to contact us if they experience progressive SOB, orthopnea, PND or progressive edema. Patient was advised and encouraged to check blood pressure at home or at a pharmacy, maintain a logbook, and also call us back if blood pressure are above the target ranges or if it is low. Patient clearly understands and agrees to the instructions. We will need to continue to monitor muscle and liver enzymes, BUN, CR, and electrolytes. No follow-ups on file. An  electronic signature was used to authenticate this note.     --Antonella Ni Holiday, DO on 4/9/2021 at 11:40 AM  9}

## 2021-04-13 ENCOUNTER — OFFICE VISIT (OUTPATIENT)
Dept: SURGERY | Age: 86
End: 2021-04-13
Payer: MEDICARE

## 2021-04-13 VITALS
TEMPERATURE: 97 F | HEART RATE: 78 BPM | BODY MASS INDEX: 23.4 KG/M2 | WEIGHT: 158 LBS | HEIGHT: 69 IN | OXYGEN SATURATION: 97 %

## 2021-04-13 DIAGNOSIS — R10.30 LOWER ABDOMINAL PAIN: Primary | ICD-10-CM

## 2021-04-13 PROCEDURE — 99213 OFFICE O/P EST LOW 20 MIN: CPT | Performed by: COLON & RECTAL SURGERY

## 2021-04-13 ASSESSMENT — ENCOUNTER SYMPTOMS
COLOR CHANGE: 0
DIARRHEA: 0
CONSTIPATION: 0
VOMITING: 0
ABDOMINAL PAIN: 1

## 2021-04-13 NOTE — PROGRESS NOTES
Subjective:      Patient ID: Faustino Ortez is a 80 y.o. male who presents for:  Chief Complaint   Patient presents with    Follow-up       This is an 71-year-old male who had a previous right colectomy last year. According to his records he had a ureteral stent removed in February of this year. He also has been having issues with hematuria or according to these notes. Patient denies any nausea or vomiting. He complains of urinary hesitancy but does appear to be urinating regularly. He complains of suprapubic pain which is been present according to him over the past few days. He has a follow-up appointment with his urologist through [facility]. He denies rectal bleeding. He denies any unintentional weight loss.       Past Medical History:   Diagnosis Date    Abnormal finding on EKG 6/13/2016    Atrial fibrillation (HCC) 1/22/2018    BPH (benign prostatic hypertrophy)     Cancer (HCC)     kidney - removed Left kidney    Colon polyps     Congestive heart failure ([address] 1/24/2019    GERD (gastroesophageal reflux disease)     Hyperlipidemia     Hypertension     Left bundle branch block 6/13/2016    Non-ischemic cardiomyopathy ([address] 4/9/2021    Osteoarthritis     Sinus bradycardia on ECG 6/13/2016     Past Surgical History:   Procedure Laterality Date    ANGIOPLASTY  05/08/2018    percutaneous transluminal angioplasty of the SMA with stent implantation, [place] [address] [facility] COLONOSCOPY  2016    COLONOSCOPY N/A 10/20/2020    [facility]  01/25/2019    DR Aida Montanez 35 N/A 11/4/2020    LAPAROSCOPIC RIGHT COLECTOMY performed by Margarita Swan MD at [address] [facility] ESOPHAGOGASTRODUODENOSCOPY TRANSORAL DIAGNOSTIC N/A 6/5/2018    EGD ESOPHAGOGASTRODUODENOSCOPY performed by Bertha Be MD at [facility]  08/12/2016 left kidney, Dr. Annmarie Tate N/A 10/20/2020    EGD ESOPHAGOGASTRODUODENOSCOPY performed by Olya Cleaning MD at [place] [address]  01/2019     Social History     Socioeconomic History    Marital status:      Spouse name: Not on file    Number of children: Not on file    Years of education: Not on file    Highest education level: Not on file   Occupational History    Not on file   Social Needs    Financial resource strain: Not on file    Food insecurity     Worry: Not on file     Inability: Not on file    Transportation needs     Medical: Not on file     Non-medical: Not on file   Tobacco Use    Smoking status: Never Smoker    Smokeless tobacco: Never Used   Substance and Sexual Activity    Alcohol use: Yes     Comment: Rarely beer    Drug use: No    Sexual activity: Not on file   Lifestyle    Physical activity     Days per week: Not on file     Minutes per session: Not on file    Stress: Not on file   Relationships    Social connections     Talks on phone: Not on file     Gets together: Not on file     Attends [employer] service: Not on file     Active member of club or organization: Not on file     Attends meetings of clubs or organizations: Not on file     Relationship status: Not on file    Intimate partner violence     Fear of current or ex partner: Not on file     Emotionally abused: Not on file     Physically abused: Not on file     Forced sexual activity: Not on file   Other Topics Concern    Not on file   Social History Narrative    Not on file     Family History   Problem Relation Age of Onset    Heart Attack Father     Heart Attack Brother      Allergies:  Morphine    Review of Systems   Constitutional: Negative for activity change, appetite change, fatigue and unexpected weight change. HENT: Negative for congestion. Gastrointestinal: Positive for abdominal pain.  Negative for constipation, diarrhea and have enlarged bladder secondary to underlying prostatic hypertrophy. I have ordered a CT scan of his abdomen and pelvis for further evaluation. No acute surgical conditions recognized on physical exam or clinical history. I will talk to him following the results to determine what his next course of action will be. Patient appears nontoxic and is able to move and function without pain or difficulty. Please note this report has beenpartially produced using speech recognition software and may cause contain errors related to that system including grammar, punctuation and spelling as well as words and phrases that may seem inappropriate.  If there arequestions or concerns please feel free to contact me to clarify

## 2021-04-20 ENCOUNTER — HOSPITAL ENCOUNTER (OUTPATIENT)
Dept: CARDIOLOGY | Age: 86
Discharge: HOME | End: 2021-04-20
Payer: MEDICARE

## 2021-04-20 PROCEDURE — 93290 INTERROG DEV EVAL ICPMS IP: CPT

## 2021-04-20 PROCEDURE — 93283 PRGRMG EVAL IMPLANTABLE DFB: CPT

## 2021-05-10 ENCOUNTER — OFFICE VISIT (OUTPATIENT)
Dept: CARDIOLOGY CLINIC | Age: 86
End: 2021-05-10
Payer: MEDICARE

## 2021-05-10 VITALS
RESPIRATION RATE: 18 BRPM | WEIGHT: 154 LBS | BODY MASS INDEX: 22.81 KG/M2 | OXYGEN SATURATION: 97 % | HEART RATE: 67 BPM | DIASTOLIC BLOOD PRESSURE: 59 MMHG | HEIGHT: 69 IN | SYSTOLIC BLOOD PRESSURE: 101 MMHG

## 2021-05-10 DIAGNOSIS — I34.0 MITRAL VALVE INSUFFICIENCY, UNSPECIFIED ETIOLOGY: ICD-10-CM

## 2021-05-10 DIAGNOSIS — Z86.79 HISTORY OF ATRIAL FIBRILLATION: ICD-10-CM

## 2021-05-10 DIAGNOSIS — Z95.810 AICD (AUTOMATIC CARDIOVERTER/DEFIBRILLATOR) PRESENT: ICD-10-CM

## 2021-05-10 DIAGNOSIS — N18.9 CHRONIC KIDNEY DISEASE, UNSPECIFIED CKD STAGE: ICD-10-CM

## 2021-05-10 DIAGNOSIS — I42.8 NICM (NONISCHEMIC CARDIOMYOPATHY) (HCC): ICD-10-CM

## 2021-05-10 DIAGNOSIS — I50.22 CHRONIC SYSTOLIC CHF (CONGESTIVE HEART FAILURE), NYHA CLASS 3 (HCC): ICD-10-CM

## 2021-05-10 DIAGNOSIS — I27.20 PULMONARY HYPERTENSION (HCC): ICD-10-CM

## 2021-05-10 PROCEDURE — 99214 OFFICE O/P EST MOD 30 MIN: CPT | Performed by: PHYSICIAN ASSISTANT

## 2021-05-10 PROCEDURE — 93000 ELECTROCARDIOGRAM COMPLETE: CPT | Performed by: PHYSICIAN ASSISTANT

## 2021-05-10 RX ORDER — ATORVASTATIN CALCIUM 10 MG/1
10 TABLET, FILM COATED ORAL DAILY
COMMUNITY

## 2021-05-10 RX ORDER — FUROSEMIDE 20 MG/1
20 TABLET ORAL DAILY
Qty: 30 TABLET | Refills: 3 | Status: SHIPPED | OUTPATIENT
Start: 2021-05-10

## 2021-05-10 RX ORDER — POTASSIUM CHLORIDE 750 MG/1
10 CAPSULE, EXTENDED RELEASE ORAL 2 TIMES DAILY
COMMUNITY
End: 2021-05-10

## 2021-05-10 RX ORDER — FERROUS SULFATE 325(65) MG
325 TABLET ORAL
COMMUNITY
End: 2021-06-07 | Stop reason: SINTOL

## 2021-05-10 RX ORDER — AMIODARONE HYDROCHLORIDE 200 MG/1
200 TABLET ORAL DAILY
COMMUNITY

## 2021-05-10 RX ORDER — POTASSIUM CHLORIDE 750 MG/1
10 CAPSULE, EXTENDED RELEASE ORAL DAILY
Qty: 30 CAPSULE | Refills: 3 | Status: SHIPPED | OUTPATIENT
Start: 2021-05-10

## 2021-05-10 ASSESSMENT — ENCOUNTER SYMPTOMS
VOMITING: 0
SHORTNESS OF BREATH: 1
BLOOD IN STOOL: 0
ABDOMINAL DISTENTION: 1
CHEST TIGHTNESS: 0
ABDOMINAL PAIN: 1
COLOR CHANGE: 0
NAUSEA: 1

## 2021-05-10 NOTE — PROGRESS NOTES
Patient: Veronika Hauser  YOB: 1934  MRN: [id number]    Chief Complaint:  Chief Complaint   Patient presents with    Congestive Heart Failure     SYSTOLIC    Fatigue     weakness         Subjective/HPI       5/10/21: Patient here for follow-up of chronic systolic congestive heart failure and nonischemic cardiomyopathy. His son from [place] accompanies him in office today. Since last office visit on 3/1/2021, patient states that he was recently hospitalized at [facility] for acute decompensated heart failure and reported hypotension. He states his Imdur was discontinued at that time, Toprol-XL was changed to 25 mg twice daily, Lasix was changed to 20 mg as needed for weight gain of 3 pounds or more and he was also added on amiodarone 200 mg p.o. daily. Will obtain records from [facility] for my review as well. He was subsequently discharged from [facility] on 4/29/2021. Since discharge, he states that he continues to feel easily fatigued and tired. He is short of breath with minimal exertion and will occasionally wake up early morning feeling short of breath with symptoms of orthopnea and PND. He denies chest pain, palpitations, diaphoresis, lower extremity edema, syncope, dizziness, lightheadedness, fever or chills. He is compliant with fluid restriction but states that he likes to eat canned soups fairly often which she is again been cautioned to avoid. I suspect that his sodium intake is higher than recommended. He is compliant with all of his medications. Most recent labs from [facility] following his last office visit were reviewed and documented below.   BMP on 3/2/2021: Sodium 140, potassium 4.8, chloride 104, total CO2 27, BUN 27, creatinine 1.17, GFR 59.0, glucose 96  proBNP 3/2/2021: 6130 compared to 6715 on 1/14/2021  BMP from 1/14/2021: Sodium 139, potassium 5.1, chloride 103, total CO2 27, BUN 22, creatinine 1.28, GFR low at 53.2, glucose 95      AICD check 4/20/21: Will obtain full report from device clinic as only first page of report was scanned into the system      Echocardiogram 1/18/21:  Conclusions   Summary   Left ventricular ejection fraction is visually estimated at 10-15%. global   hypokinesis. Left ventricular size is severely increased . Restrictive filling pattern. Moderately severe (3-4+) mitral regurgitation is present. ERO=0.2cm , RV   32M   No evidence of mitral valve stenosis. No evidence of aortic valve regurgitation . No evidence of aortic valve stenosis. The left atrium is Mildly dilated. There is moderate tricuspid regurgitation with estimated RVSP of 63 mm Hg. Right ventricular systolic pressure of 63 mm Hg consistent with moderate   to severe pulmonary hypertension. Signature   ----------------------------------------------------------------   Electronically signed by Celso Gutierrez.DO(Interpreting   physician) on 01/18/2021 06:46 PM    EKG in office today: ? A-fib/flutter 71, LBBB, QTc 519ms      Vital signs stable in office today. Blood pressure 101/59, heart rate 67, pulse ox 97% on room air. Weight is 154 pounds compared to 155 pounds at last office visit on 3/1/2021. 3/1/21: Patient here for follow-up of chronic systolic congestive heart failure. Was last seen in the office on 1/15/2021. He was recently admitted to [facility] after presenting for preoperative evaluation prior to right ureteral stent exchange. Apparently during his preoperative evaluation he was complaining of severe groin pain as well as increasing shortness of breath, nausea and lightheadedness as well as worsening hematuria so he was sent to the ER for further evaluation and was subsequently admitted to [facility].  He was evaluated by cardiology and medication adjustments made. Coreg and midodrine were discontinued and he was initiated on Toprol-XL 50 mg daily, hydralazine 10 mg 3 times daily and Imdur 30 mg daily.   His Lasix was continued as needed for weight gain. CT of the abdomen showed right ureteral stent malfunction with right-sided hydronephrosis and subsequently underwent right ureteral stent exchange with Dr. Oscar Lobo on 2/11/2021. Since discharge from [facility] on 2/12/2021, patient complains of intermittent sharp pelvic pains and occasional low right-sided back pain. Also, he complains of some difficulty urinating and feeling like he cannot completely empty his bladder. He denies any current hematuria. He has been advised to call his urologist for further recommendations. He is compliant with all of his medications. He has a neighbor that helps manage his medications for him. He brought with him all of his medication bottles for review and medication list updated. Patient tries to adhere to low-sodium diet. He states that he has [employer] which provide him with low-sodium meals. He does not often prepare meals himself. He is adherent to fluid restriction. ICD remote check 1/26/21: 9 nonsustained VT episodes, 40 AT/A. fib episodes 4.8% with longest duration of 30 hours since 8/21/2020, good RA pacer capture, RV sensing and RA/RV lead impedances; RA/RV E grams appropriate DDDR pacing; OptiVol fluid index below threshold, battery status okay      Most recent echocardiogram completed on 1/18/2021 revealed worsening LV systolic function with EF now 10 to 15%. To prior EF between 20 to 25% when last checked per echo on 1/15/2019 at [facility]. Echocardiogram 1/18/21:  Conclusions   Summary   Left ventricular ejection fraction is visually estimated at 10-15%. global   hypokinesis. Left ventricular size is severely increased . Restrictive filling pattern. Moderately severe (3-4+) mitral regurgitation is present. ERO=0.2cm , RV   32M   No evidence of mitral valve stenosis. No evidence of aortic valve regurgitation . No evidence of aortic valve stenosis. The left atrium is Mildly dilated.    There is exactly what he is taking at this time. Due to hypotension at his last visit, his carvedilol was discontinued however it appears that it was later renewed by Dr. Monika Crowe. Patient denies any new or worsening heart failure symptoms. No shortness of breath or dyspnea on exertion, chest pain, dizziness, lightheadedness, diaphoresis, paroxysmal nocturnal dyspnea, orthopnea, lower extremity edema, syncope, fever or chills. He admits to being sedentary and not doing much during the day. He does complain of fatigue. Patient with history of BiV ICD implant in October 30, 2018 by Dr. Melissa Moss. States he has not seen Dr. Melissa Moss nor has he had an ICD check in a long time. Will refer patient to [facility] device clinic for routine evaluations. Patient's last cardiac catheterization was on 12/8/2017 with Dr. Hannah Eastman which revealed trivial irregularity of the coronary arteries, normal LVEDP. Last echocardiogram in January 2019 at [facility]: EF 20 to 25%, moderate mitral valve regurgitation, moderate to severe pulmonary hypertension, moderate TR, mild AR    Reviewed with patient regarding importance of low-sodium diet and fluid restriction. He is likely noncompliant with sodium intake at times but has no sign of volume overload. He only takes his Lasix as needed for weight gain or swelling. Most recent blood work reviewed and documented below. BMP 11/23/2020: Sodium 139, potassium 3.9, chloride 101, total CO2 of 30, BUN 25, creatinine elevated 1.44, GFR low at 46, glucose 107  CBC 11/18/2020: WBC 5.75, hemoglobin low at 7.7, hematocrit low at 24.9, platelets 862    Vital signs stable in office today with blood pressure 108/66, heart rate 71, pulse ox 98% on room air. Weight is 151 pounds compared to 157 pounds at last office visit on 7/21/2020.          7/21/20: Patient is here for follow-up of chronic systolic congestive heart failure.   Was initially evaluated by me on 7/9/2020 following referral by Dr. Tressa Interiano. Previously had issues with hypotension and was discontinued on the majority of his cardiac medications however during his initial evaluation with me his blood pressure had normalized and he was resumed on low-dose carvedilol and Lasix. 1 week following this initial office visit, he called the office complaining of recurrent hypotension with systolic in the 06R to 61R range and was advised to discontinue his carvedilol and hold Lasix for 3 days. He was also advised ER as he was complaining of significant fatigue and tiredness with inability to get out of bed and lower extremity pain. He presented to [facility]  on 7/17/2020 for further evaluation of low blood pressure and weakness complaints. On presentation to ER, he was normotensive with blood pressure of 129/76, heart rate 74, respiratory rate 18, pulse ox of 98%, afebrile with a temperature of 98.2 °F. WBC 6.2, hemoglobin 11.3, hematocrit 33.3, platelets 824. Sodium 139, potassium 3.9, chloride 104, total CO2 24, BUN elevated at 31, creatinine elevated 2.15, GFR low at 29, glucose 117. BNP 1096. Troponin negative. EKG showed sinus rhythm with ventricular rate of 79 bpm with left bundle branch block. Chest x-ray revealed no acute cardiopulmonary process. He was felt to have symptoms of claudication given his complaints of bilateral lower extremity pain and cramping with ambulation and was referred to vascular surgery as outpatient. As patient's ER evaluation was fairly unremarkable, he was subsequently discharged home. Since his discharge from [facility] ER on 7/17/2020, patient has continued to feel unwell with complaints of fatigue and tiredness especially during ambulation. Also he continues to experience pain and cramping in his lower extremities with ambulation. He is extremely confused on what medications he is currently taking and did not bring his medication bottles or list with him to the office today. He states he previously had a friend who is helping him manage his medications however she has been sick recently and unable to do so. Concern is that patient is accidentally taking more medicines than he realizes which is contributing to his hypotension and dizziness and weakness complaints. Blood pressure low in office today at 86/55 on the left and 83/57 on the right. Repeat blood pressure completed manually by me was 100/60 on the right while seated but systolic dropping into the 70s upon standing. Heart rate 83, pulse ox 99% on room air. Weight is 157 pounds compared to 161 pounds at last visit. EKG 7/17/2020 at [facility]: Normal sinus rhythm, 79 bpm, left bundle branch block, T wave inversions in leads II, 3, aVF and poor R wave progression in V3 through V6,  ms      Recent lab work reviewed and documented below. BMP from 7/17/2020 at [facility]: Sodium 139, potassium 3.9, chloride 104, total CO2 24, BUN elevated 31, creatinine elevated 2.15, GFR low at 29, glucose 117  BMP from 7/9/2020: Sodium 134, potassium 4.4, chloride 100, total CO2 25, BUN elevated 24, creatinine elevated 1.36, GFR low at 49.7, glucose 108  proBNP 7/9/2020: 4095        7/9/20 CHF clinic visit #1: This is a very pleasant 66-year-old  male with past medical history significant for chronic systolic congestive heart failure, cardiomyopathy with EF of 30 to 35% per last echocardiogram in 2018, history of AICD placement, history of A. fib on oral anticoagulation with Eliquis, chronic kidney disease with history of left nephrectomy due to renal cancer presents for initial CHF clinic evaluation following referral by Dr. Yulisa Bhat. Patient states that he had been having significant fatigue and tiredness anytime he would try to exert himself and states that his blood pressure had been running low.   On office visit with Dr. Andreas Briones on 6/8/2020, patient's Entresto, Lasix and Spironolactone were discontinued due to low blood pressure with reported home readings of systolic blood pressure in the 70s. He was again seen by Dr. Santiago Denver on 6/18/2020 prior to alert urologic procedure and he was okayed to hold his Xarelto prior to this procedure and no other medication adjustments were made at that time. His blood pressure was stable at his office visit on 6/18/2020 with blood pressure of 116/62. He was then evaluated by Dr. Santiago Denver again on 7/2/2020 at which time he was referred to the CHF clinic. Patient states that since these medications were discontinued, he has been feeling better overall. He states that the last 2 days have been the best days for him. He denies any significant shortness of breath, chest pain, palpitations, diaphoresis, paroxysmal nocturnal dyspnea, orthopnea, lower extremity edema, syncope, fever or chills. He continues to experience some fatigue and tiredness with exertion but states that this has overall improved since his blood pressure has improved. He is not currently weighing himself daily at home but has been advised to start doing so. Educated the patient extensively on importance of diet modification including low-sodium diet and fluid restriction. Blood pressure in office today is stable at 141/80. We will plan to resume low-dose Coreg 3.125 mg p.o. twice daily and Lasix 20 mg p.o. daily to further optimize heart failure medications. Over the past month since his previous meds were discontinued he is only been taking Eliquis 2.5 mg p.o. twice daily and Zocor 20 mg p.o. daily. Recent labs reviewed and documented below. BMP from 6/8/2020: Sodium 138, potassium 4.2, chloride 102, total CO2 25, BUN elevated 70, creatinine elevated 1.92, GFR low at 33.4, glucose 115  proBNP 6/8/2020: 2476    Vital signs stable in office today with blood pressure 141/80, heart rate 76, pulse ox 98% on room air. Weight is 161 pounds.       Echocardiogram 9/13/18:  Conclusions Summary   LV is moderately dilated   LVEF is severely depressed with EF 30-35%   E/A flow reversal noted. Suggestive of diastolic dysfunction. Signature   ----------------------------------------------------------------   Electronically signed by Greg Bowser MD(Interpreting   physician) on 09/13/2018 11:45 AM   ----------------------------------------------------------------   Findings  Left Ventricle  LV is moderately dilated  LVEF is severely depressed with EF 30-35%  E/A flow reversal noted. Suggestive of diastolic dysfunction. Right Ventricle  Normal right ventricular size with preserved RV function. Left Atrium  Moderately dilated left atrium. Right Atrium  Normal right atrial dimension with no evidence of thrombus or mass noted. Mitral Valve  Mitral annular calcification is present. Mild MR  Tricuspid Valve  Normal structure with Mild tricuspid valvular regurgitation noted. Aortic Valve  Aortic valve leaflets are moderately thickened. Trace AR  Pulmonic Valve  Mild IN  Pericardial Effusion  No evidence of significant pericardial effusion is noted. Pleural Effusion  No evidence of pleural effusion. Aorta \ Miscellaneous  The aorta is within normal limits. Aortic root dimension within normal limits.       PMHx:  -Hx of superior mesenteric artery stenosis s/p PTA/stent in [place]/May 2018  -CMP EF 30-35%  -Hx AICD  -Trivial CAD per [facility] 12/8/17  -CKD  -Renal CA s/p left nephrectomy around 2014  -Dyslipidemia  -Recurrent UTI  -Hx A-fib on [address] [place] [address] with Eliquis    PSHx:  -Left nephrectomy for CA  -BiV ICD 10/30/18 with Dr. Josseline Alarcon Hx:  Nonsmoker  Social alcohol  No drugs    Family Hx:  -Father with \"heart problems\"    Allergies   Allergen Reactions    Morphine      Other reaction(s): Delirium       Current Outpatient Medications   Medication Sig Dispense Refill    amiodarone (CORDARONE) 200 MG tablet Take 200 mg by mouth daily      atorvastatin (LIPITOR) 10 MG tablet Take 10 mg by mouth daily      ferrous sulfate (IRON 325) 325 (65 Fe) MG tablet Take 325 mg by mouth daily (with breakfast)      furosemide (LASIX) 20 MG tablet Take 1 tablet by mouth daily 30 tablet 3    potassium chloride (MICRO-K) 10 MEQ extended release capsule Take 1 capsule by mouth daily 30 capsule 3    omeprazole (PRILOSEC) 40 MG delayed release capsule Take 1 capsule by mouth every morning (before breakfast) 90 capsule 1    metoprolol succinate (TOPROL XL) 50 MG extended release tablet Take 25 mg by mouth 2 times daily       acetaminophen (TYLENOL) 325 MG tablet Take 650 mg by mouth every 4 hours as needed for Pain      ipratropium-albuterol (DUONEB) 0.5-2.5 (3) MG/3ML SOLN nebulizer solution Inhale 3 mLs into the lungs every 4 hours as needed for Shortness of Breath 360 mL 1    ibuprofen (ADVIL;MOTRIN) 600 MG tablet Take 1 tablet by mouth 4 times daily as needed for Pain 40 tablet 0    apixaban (ELIQUIS) 2.5 MG TABS tablet Take 2.5 mg by mouth 2 times daily      tamsulosin (FLOMAX) 0.4 MG capsule Take 0.4 mg by mouth daily      Multiple Vitamins-Minerals (EYE VITAMINS) CAPS Take 1 capsule by mouth daily      ondansetron (ZOFRAN) 4 MG tablet TAKE 1 TO 2 TABLETS EVERY 4 TO 6 HOURS AS NEEDED FOR NAUSEA AND VOMITING 30 tablet 1    latanoprost (XALATAN) 0.005 % ophthalmic solution Place 1 drop into both eyes nightly      Omega-3 Fatty Acids (OMEGA 3 500) 500 MG CAPS Take by mouth daily 1 capsule daily       No current facility-administered medications for this visit.         Past Medical History:   Diagnosis Date    Abnormal finding on EKG 6/13/2016    Atrial fibrillation (HCC) 1/22/2018    BPH (benign prostatic hypertrophy)     Cancer (HCC)     kidney - removed Left kidney    Colon polyps     Congestive heart failure ([facility] 1/24/2019    GERD (gastroesophageal reflux disease)     Hyperlipidemia     Hypertension     Left bundle branch block 6/13/2016    Non-ischemic cardiomyopathy ([facility] 4/9/2021    Osteoarthritis     Not on file     Attends meetings of clubs or organizations: Not on file     Relationship status: Not on file    Intimate partner violence     Fear of current or ex partner: Not on file     Emotionally abused: Not on file     Physically abused: Not on file     Forced sexual activity: Not on file   Other Topics Concern    Not on file   Social History Narrative    Not on file       Family History   Problem Relation Age of Onset    Heart Attack Father     Heart Attack Brother          Review of Systems:   Review of Systems   Constitutional: Positive for fatigue (with exertion). Negative for activity change, chills, fever and unexpected weight change. HENT: Negative for congestion. No smell--lost years ago after accident   Respiratory: Positive for shortness of breath (with minimal exertion). Negative for chest tightness. Cardiovascular: Negative for chest pain, palpitations and leg swelling. Occasional orthopnea/PND   Gastrointestinal: Positive for abdominal distention (occasional fullness), abdominal pain (low abdominal/pubic pain in AM--ongoing since urologic procedure) and nausea (intermittent). Negative for blood in stool and vomiting. Genitourinary: Positive for difficulty urinating (difficulty emptying bladder). Negative for dysuria and hematuria. Musculoskeletal: Negative for arthralgias and myalgias. Skin: Negative for color change, pallor and rash. Neurological: Negative for dizziness, syncope and light-headedness. Psychiatric/Behavioral: Negative for agitation and behavioral problems. Physical Examination:    BP (!) 101/59 (Site: Left Upper Arm, Position: Sitting, Cuff Size: Small Adult)   Pulse 67   Resp 18   Ht 5' 9\" (1.753 m)   Wt 154 lb (69.9 kg)   SpO2 97%   BMI 22.74 kg/m²    Physical Exam  Constitutional:       General: He is not in acute distress. Appearance: Normal appearance. HENT:      Head: Normocephalic and atraumatic.    Neck: Musculoskeletal: Normal range of motion and neck supple. Cardiovascular:      Rate and Rhythm: Normal rate and regular rhythm. Pulmonary:      Effort: Pulmonary effort is normal. No respiratory distress. Breath sounds: Rhonchi (on right) present. No wheezing or rales. Comments: Decreased breath sounds at bases  Abdominal:      General: Bowel sounds are normal.      Palpations: Abdomen is soft. Tenderness: There is no abdominal tenderness. Musculoskeletal: Normal range of motion. Right lower leg: No edema. Left lower leg: No edema. Skin:     General: Skin is warm and dry. Coloration: Skin is pale. Neurological:      General: No focal deficit present. Mental Status: He is alert and oriented to person, place, and time. Cranial Nerves: No cranial nerve deficit.    Psychiatric:         Mood and Affect: Mood normal.         Behavior: Behavior normal.         LABS:  CBC:   Lab Results   Component Value Date    WBC 6.3 01/14/2021    RBC 3.77 01/14/2021    RBC 2.89 03/08/2019    HGB 11.2 01/14/2021    HCT 34.6 01/14/2021    MCV 91.9 01/14/2021    MCH 29.6 01/14/2021    MCHC 32.2 01/14/2021    RDW 20.6 01/14/2021     01/14/2021    MPV 11.3 11/18/2020     Lipids:  Lab Results   Component Value Date    CHOL 164 01/02/2019    CHOL 143 06/29/2018    CHOL 136 04/19/2018     Lab Results   Component Value Date    TRIG 337 (H) 01/02/2019    TRIG 160 06/29/2018    TRIG 326 (H) 04/19/2018     Lab Results   Component Value Date    HDL 34 (L) 01/02/2019    HDL 32 (L) 06/29/2018    HDL 28 (L) 04/19/2018     Lab Results   Component Value Date    LDLCALC 63 01/02/2019    LDLCALC 79 06/29/2018    LDLCALC 43 04/19/2018     Lab Results   Component Value Date    VLDL 16 09/19/2017     Lab Results   Component Value Date    CHOLHDLRATIO 2.9 09/19/2017    CHOLHDLRATIO 4.7 06/21/2011     CMP:    Lab Results   Component Value Date     03/02/2021    K 4.8 03/02/2021    K 4.3 01/25/2018  03/02/2021    CO2 27 03/02/2021    BUN 27 03/02/2021    CREATININE 1.17 03/02/2021    GFRAA >60.0 03/02/2021    AGRATIO 1.3 03/05/2019    LABGLOM 59.0 03/02/2021    GLUCOSE 96 03/02/2021    GLUCOSE 107 11/23/2020    PROT 5.5 11/09/2020    LABALBU 3.1 11/09/2020    LABALBU 3.1 03/05/2019    CALCIUM 9.2 03/02/2021    BILITOT 0.3 11/09/2020    ALKPHOS 51 11/09/2020    AST 15 11/09/2020    ALT 15 11/09/2020     BMP:    Lab Results   Component Value Date     03/02/2021    K 4.8 03/02/2021    K 4.3 01/25/2018     03/02/2021    CO2 27 03/02/2021    BUN 27 03/02/2021    LABALBU 3.1 11/09/2020    LABALBU 3.1 03/05/2019    CREATININE 1.17 03/02/2021    CALCIUM 9.2 03/02/2021    GFRAA >60.0 03/02/2021    LABGLOM 59.0 03/02/2021    GLUCOSE 96 03/02/2021    GLUCOSE 107 11/23/2020     Magnesium:    Lab Results   Component Value Date    MG 2.2 03/07/2019     TSH:No results found for: TSHFT4, TSH  .result  No results for input(s): PROBNP in the last 72 hours. No results for input(s): INR in the last 72 hours. 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  Chronic systolic CHF (congestive heart failure), NYHA class 3 (HCC)  EKG 12 lead    Change lasix 20mg PO daily from as needed   2. NICM (nonischemic cardiomyopathy) ([address]  EKG 12 lead    EF 10-15% per echo 1/18/21. Minimal CAD per [facility] 12/8/17. 3. AICD (automatic cardioverter/defibrillator) present  EKG 12 lead   4. Mitral valve insufficiency, unspecified etiology  EKG 12 lead    Moderately severe (3-4+) MR per echo 1/18/21   5. History of atrial fibrillation  EKG 12 lead    on OAC with Eliquis 2.5mg PO BID   6. Pulmonary hypertension (HCC)      RVSP 63mmHg per echo 1/18/21   7. Chronic kidney disease, unspecified CKD stage  EKG 12 lead    Maintain follow-up with urology through [address] [place] [address] medical therapy--Toprol XL 25 mg PO twice daily, change lasix to 20mg p.o. daily from as needed, change potassium chloride to 10 mEq p.o. daily from as needed, amiodarone 200 mg p.o. daily, continue oral anticoagulation with Eliquis 2.5 mg p.o. twice daily, continue Zocor 20 mg p.o. daily  -Midodrine discontinued per cardiology while hospitalized at [facility] in past.  Will consider resumption of midodrine in future if recurrent orthostatic hypotension  -Was on Entresto in the past but this had been discontinued at some point secondary to hypotension as well as renal failure  **Since recent hospital discharge from [facility] on 4/29/2021, patient has been experiencing episodes of orthopnea and PND for which he has taken a few as needed doses of Lasix. Most recent episode happened early this morning.  -Plan to check repeat BMP and BNP at next office visit in 2 weeks  -Cardiac/<2 gram sodium diet recommended  -1500mL daily fluid restriction  -Instructed patient to weigh self daily every morning upon waking and keep log book of daily weights.  Notify office if gaining more than 3 pounds in 48 hours  -Recommend routine blood pressure monitoring at home  -Advised patient to notify office immediately if experiencing any progressive SOB, orthopnea, PND, LE edema or weight gain  -Educated patient on importance of fluid and salt restriction

## 2021-05-24 ENCOUNTER — OFFICE VISIT (OUTPATIENT)
Dept: CARDIOLOGY CLINIC | Age: 86
End: 2021-05-24
Payer: MEDICARE

## 2021-05-24 VITALS
SYSTOLIC BLOOD PRESSURE: 120 MMHG | WEIGHT: 151 LBS | BODY MASS INDEX: 22.36 KG/M2 | DIASTOLIC BLOOD PRESSURE: 73 MMHG | OXYGEN SATURATION: 99 % | RESPIRATION RATE: 18 BRPM | HEIGHT: 69 IN | HEART RATE: 82 BPM

## 2021-05-24 DIAGNOSIS — I34.0 SEVERE MITRAL VALVE REGURGITATION: ICD-10-CM

## 2021-05-24 DIAGNOSIS — N18.9 CHRONIC KIDNEY DISEASE, UNSPECIFIED CKD STAGE: ICD-10-CM

## 2021-05-24 DIAGNOSIS — Z86.79 HISTORY OF ATRIAL FIBRILLATION: ICD-10-CM

## 2021-05-24 DIAGNOSIS — I07.1 MODERATE TRICUSPID REGURGITATION: ICD-10-CM

## 2021-05-24 DIAGNOSIS — I50.22 CHRONIC SYSTOLIC CHF (CONGESTIVE HEART FAILURE), NYHA CLASS 3 (HCC): ICD-10-CM

## 2021-05-24 DIAGNOSIS — I27.20 PULMONARY HYPERTENSION (HCC): ICD-10-CM

## 2021-05-24 DIAGNOSIS — I42.8 NICM (NONISCHEMIC CARDIOMYOPATHY) (HCC): ICD-10-CM

## 2021-05-24 DIAGNOSIS — Z95.810 AICD (AUTOMATIC CARDIOVERTER/DEFIBRILLATOR) PRESENT: ICD-10-CM

## 2021-05-24 LAB
ANION GAP SERPL CALCULATED.3IONS-SCNC: 11 MEQ/L (ref 9–15)
BUN BLDV-MCNC: 31 MG/DL (ref 8–23)
CALCIUM SERPL-MCNC: 9.2 MG/DL (ref 8.5–9.9)
CHLORIDE BLD-SCNC: 103 MEQ/L (ref 95–107)
CO2: 27 MEQ/L (ref 20–31)
CREAT SERPL-MCNC: 1.9 MG/DL (ref 0.7–1.2)
GFR AFRICAN AMERICAN: 40.8
GFR NON-AFRICAN AMERICAN: 33.7
GLUCOSE BLD-MCNC: 110 MG/DL (ref 70–99)
POTASSIUM SERPL-SCNC: 4.1 MEQ/L (ref 3.4–4.9)
PRO-BNP: 8286 PG/ML
SODIUM BLD-SCNC: 141 MEQ/L (ref 135–144)

## 2021-05-24 PROCEDURE — 99214 OFFICE O/P EST MOD 30 MIN: CPT | Performed by: PHYSICIAN ASSISTANT

## 2021-05-24 ASSESSMENT — ENCOUNTER SYMPTOMS
CHEST TIGHTNESS: 0
COLOR CHANGE: 0
SHORTNESS OF BREATH: 1
VOMITING: 0
ABDOMINAL PAIN: 1
SORE THROAT: 0
NAUSEA: 1
ABDOMINAL DISTENTION: 0
BLOOD IN STOOL: 0
COUGH: 1
RHINORRHEA: 1
SINUS PRESSURE: 0

## 2021-05-24 NOTE — PATIENT INSTRUCTIONS
-Check daily weight every morning and notify CHF clinic if gaining more than 3 pounds in 2 days    **Limit total daily fluid intake to less than 1.5 liter (48 ounces) daily    **Limit total daily sodium intake to less than 2000mg daily      Check labs (BMP and BNP) today    **Call office if sinus symptoms persist/worsen**

## 2021-05-24 NOTE — PROGRESS NOTES
Patient: Levon Agudelo  YOB: 1934  MRN: [id number]    Chief Complaint:  Chief Complaint   Patient presents with    Congestive Heart Failure     systolic    Fatigue     weak    Shortness of Breath     with exertion         Subjective/HPI     5/24/21: Here for follow-up of systolic congestive heart failure nonischemic cardiomyopathy. Following last office visit, patient was changed from Lasix as needed to 20 mg p.o. daily and also placed on potassium chloride 10 mEq p.o. daily. He continues to complain of fatigue and tiredness which is an ongoing issue. He denies any significant worsening in shortness of breath, chest pain, palpitations, diaphoresis, lower extremity edema, dizziness, lightheadedness, syncope, fever or chills. Over the past 5 days he has been having some upper respiratory issues including runny nose and sneezing with occasional mildly productive cough. He denies any fever or chills. Yesterday he had an episode of abdominal pain followed by 3 episodes of diarrhea which have since resolved. He is somewhat forgetful and has to be reminded multiple times regarding need to limit his fluid intake. He reports drinking significant amount of coffee at least 3 cups every morning which she has been cautioned to limit. Again reiterated the importance of restricting fluid intake to around 48 ounces daily. He is compliant with all of his medications. States that he will be following up with [facility] urology again in the near future for ureteral stenting. Recent AICD interrogation from [facility] on 4/23/2021 reviewed and unremarkable. Patient previously has followed with Dr. Federico Grimes but most recently in the office saw Dr. Luiza Martel. Discussed with Dr. Federico Grimes today regarding whether or not patient would be an acceptable candidate for mitral valve clip given his moderate to severe MR per most recent echo on 1/18/2021.   Dr. Federico Grimes plans to follow with patient in creatinine 1.28, GFR low at 53.2, glucose 95      AICD check 4/20/21: Will obtain full report from device clinic as only first page of report was scanned into the system      Echocardiogram 1/18/21:  Conclusions   Summary   Left ventricular ejection fraction is visually estimated at 10-15%. global   hypokinesis. Left ventricular size is severely increased . Restrictive filling pattern. Moderately severe (3-4+) mitral regurgitation is present. ERO=0.2cm , RV   32M   No evidence of mitral valve stenosis. No evidence of aortic valve regurgitation . No evidence of aortic valve stenosis. The left atrium is Mildly dilated. There is moderate tricuspid regurgitation with estimated RVSP of 63 mm Hg. Right ventricular systolic pressure of 63 mm Hg consistent with moderate   to severe pulmonary hypertension. Signature   ----------------------------------------------------------------   Electronically signed by Renata Guevara DO(Interpreting   physician) on 01/18/2021 06:46 PM    EKG in office today: ? A-fib/flutter 71, LBBB, QTc 519ms      Vital signs stable in office today. Blood pressure 101/59, heart rate 67, pulse ox 97% on room air. Weight is 154 pounds compared to 155 pounds at last office visit on 3/1/2021. 3/1/21: Patient here for follow-up of chronic systolic congestive heart failure. Was last seen in the office on 1/15/2021. He was recently admitted to [facility] after presenting for preoperative evaluation prior to right ureteral stent exchange. Apparently during his preoperative evaluation he was complaining of severe groin pain as well as increasing shortness of breath, nausea and lightheadedness as well as worsening hematuria so he was sent to the ER for further evaluation and was subsequently admitted to [facility].  He was evaluated by cardiology and medication adjustments made.   Coreg and midodrine were discontinued and he was initiated on Toprol-XL 50 mg daily, hydralazine 10 mg 3 times daily and Imdur 30 mg daily. His Lasix was continued as needed for weight gain. CT of the abdomen showed right ureteral stent malfunction with right-sided hydronephrosis and subsequently underwent right ureteral stent exchange with Dr. Lucille Santoro on 2/11/2021. Since discharge from [facility] on 2/12/2021, patient complains of intermittent sharp pelvic pains and occasional low right-sided back pain. Also, he complains of some difficulty urinating and feeling like he cannot completely empty his bladder. He denies any current hematuria. He has been advised to call his urologist for further recommendations. He is compliant with all of his medications. He has a neighbor that helps manage his medications for him. He brought with him all of his medication bottles for review and medication list updated. Patient tries to adhere to low-sodium diet. He states that he has [employer] Whe[employer] which provide him with low-sodium meals. He does not often prepare meals himself. He is adherent to fluid restriction. ICD remote check 1/26/21: 9 nonsustained VT episodes, 40 AT/A. fib episodes 4.8% with longest duration of 30 hours since 8/21/2020, good RA pacer capture, RV sensing and RA/RV lead impedances; RA/RV E grams appropriate DDDR pacing; OptiVol fluid index below threshold, battery status okay      Most recent echocardiogram completed on 1/18/2021 revealed worsening LV systolic function with EF now 10 to 15%. To prior EF between 20 to 25% when last checked per echo on 1/15/2019 at [facility]. Echocardiogram 1/18/21:  Conclusions   Summary   Left ventricular ejection fraction is visually estimated at 10-15%. global   hypokinesis. Left ventricular size is severely increased . Restrictive filling pattern. Moderately severe (3-4+) mitral regurgitation is present. ERO=0.2cm , RV   32M   No evidence of mitral valve stenosis. No evidence of aortic valve regurgitation .    No evidence of aortic valve stenosis. The left atrium is Mildly dilated. There is moderate tricuspid regurgitation with estimated RVSP of 63 mm Hg. Right ventricular systolic pressure of 63 mm Hg consistent with moderate   to severe pulmonary hypertension. Signature   ----------------------------------------------------------------   Electronically signed by Jalyn Jules DO(Interpreting   physician) on 01/18/2021 06:46 PM    Most recent labs from [facility] reviewed and documented below  BMP on 2/12/2021: Sodium 142, potassium 4.4, chloride 108, total CO2 29, BUN elevated 35, creatinine elevated 1.71, GFR low at 35  CBC 2/12/2021: WBC 4.1, hemoglobin 9.2, platelets 431     Vital signs stable in office today. Blood pressure 93/56 on the right and 103/62 on the left. States he is taken his morning medications already. Heart rate 74 bpm, pulse ox 98% on room air. Weight is 155 pounds compared to 151 pounds at last visit. **While walking out of exam room, patient complained of feeling very tired and needing to sit down. Blood pressure checked and patient hypotensive with blood pressure 90/50 on the left. After resting for a few minutes, patient symptoms resolved. Will discontinue hydralazine completely given hypotension. **        1/14/21: Patient here for follow-up of chronic systolic congestive heart failure and nonischemic cardiomyopathy. Was last seen in heart failure clinic in July 2020. In the interim, patient underwent right colectomy in November 2020 with Dr. Shazia Salazar due to right colon cancer. Patient also reports that he received iron transfusions in November 2020 for anemia. He states he is to follow-up with oncology, Dr. David Mixon in March. No plan for radiation or chemo at this time per patient. Patient's last CBC in November 2020 showed significant anemia with hemoglobin around 7.7 and no repeat blood work since that time. Has baseline CKD with creatinine in November of 1.44.   Patient has a neighbor who helps him with his medications and he is uncertain of exactly what he is taking at this time. Due to hypotension at his last visit, his carvedilol was discontinued however it appears that it was later renewed by Dr. Tamara Albarran. Patient denies any new or worsening heart failure symptoms. No shortness of breath or dyspnea on exertion, chest pain, dizziness, lightheadedness, diaphoresis, paroxysmal nocturnal dyspnea, orthopnea, lower extremity edema, syncope, fever or chills. He admits to being sedentary and not doing much during the day. He does complain of fatigue. Patient with history of BiV ICD implant in October 30, 2018 by Dr. Margarita Miner. States he has not seen Dr. Margarita Miner nor has he had an ICD check in a long time. Will refer patient to [facility] device clinic for routine evaluations. Patient's last cardiac catheterization was on 12/8/2017 with Dr. Burgos Or which revealed trivial irregularity of the coronary arteries, normal LVEDP. Last echocardiogram in January 2019 at [facility]: EF 20 to 25%, moderate mitral valve regurgitation, moderate to severe pulmonary hypertension, moderate TR, mild AR    Reviewed with patient regarding importance of low-sodium diet and fluid restriction. He is likely noncompliant with sodium intake at times but has no sign of volume overload. He only takes his Lasix as needed for weight gain or swelling. Most recent blood work reviewed and documented below. BMP 11/23/2020: Sodium 139, potassium 3.9, chloride 101, total CO2 of 30, BUN 25, creatinine elevated 1.44, GFR low at 46, glucose 107  CBC 11/18/2020: WBC 5.75, hemoglobin low at 7.7, hematocrit low at 24.9, platelets 672    Vital signs stable in office today with blood pressure 108/66, heart rate 71, pulse ox 98% on room air.   Weight is 151 pounds compared to 157 pounds at last office visit on 7/21/2020.          7/21/20: Patient is here for follow-up of chronic systolic congestive heart failure. Was initially evaluated by me on 7/9/2020 following referral by Dr. Gilmar Pacheco. Previously had issues with hypotension and was discontinued on the majority of his cardiac medications however during his initial evaluation with me his blood pressure had normalized and he was resumed on low-dose carvedilol and Lasix. 1 week following this initial office visit, he called the office complaining of recurrent hypotension with systolic in the 01U to 38G range and was advised to discontinue his carvedilol and hold Lasix for 3 days. He was also advised ER as he was complaining of significant fatigue and tiredness with inability to get out of bed and lower extremity pain. [facility] for further evaluation of low blood pressure and weakness complaints. On presentation to ER, he was normotensive with blood pressure of 129/76, heart rate 74, respiratory rate 18, pulse ox of 98%, afebrile with a temperature of 98.2 °F. WBC 6.2, hemoglobin 11.3, hematocrit 33.3, platelets 578. Sodium 139, potassium 3.9, chloride 104, total CO2 24, BUN elevated at 31, creatinine elevated 2.15, GFR low at 29, glucose 117. BNP 1096. Troponin negative. EKG showed sinus rhythm with ventricular rate of 79 bpm with left bundle branch block. Chest x-ray revealed no acute cardiopulmonary process. He was felt to have symptoms of claudication given his complaints of bilateral lower extremity pain and cramping with ambulation and was referred to vascular surgery as outpatient. As patient's ER evaluation was fairly unremarkable, he was subsequently discharged home. Since his discharge from [facility] ER on 7/17/2020, patient has continued to feel unwell with complaints of fatigue and tiredness especially during ambulation. Also he continues to experience pain and cramping in his lower extremities with ambulation.   He is extremely confused on what medications he is currently taking and 6/8/2020, patient's Entresto, Lasix and Spironolactone were discontinued due to low blood pressure with reported home readings of systolic blood pressure in the 70s. He was again seen by Dr. Ernie Falcon on 6/18/2020 prior to alert urologic procedure and he was okayed to hold his Xarelto prior to this procedure and no other medication adjustments were made at that time. His blood pressure was stable at his office visit on 6/18/2020 with blood pressure of 116/62. He was then evaluated by Dr. Ernie Falcon again on 7/2/2020 at which time he was referred to the CHF clinic. Patient states that since these medications were discontinued, he has been feeling better overall. He states that the last 2 days have been the best days for him. He denies any significant shortness of breath, chest pain, palpitations, diaphoresis, paroxysmal nocturnal dyspnea, orthopnea, lower extremity edema, syncope, fever or chills. He continues to experience some fatigue and tiredness with exertion but states that this has overall improved since his blood pressure has improved. He is not currently weighing himself daily at home but has been advised to start doing so. Educated the patient extensively on importance of diet modification including low-sodium diet and fluid restriction. Blood pressure in office today is stable at 141/80. We will plan to resume low-dose Coreg 3.125 mg p.o. twice daily and Lasix 20 mg p.o. daily to further optimize heart failure medications. Over the past month since his previous meds were discontinued he is only been taking Eliquis 2.5 mg p.o. twice daily and Zocor 20 mg p.o. daily. Recent labs reviewed and documented below. BMP from 6/8/2020: Sodium 138, potassium 4.2, chloride 102, total CO2 25, BUN elevated 70, creatinine elevated 1.92, GFR low at 33.4, glucose 115  proBNP 6/8/2020: 2476    Vital signs stable in office today with blood pressure 141/80, heart rate 76, pulse ox 98% on room air. Weight is 161 pounds. Echocardiogram 9/13/18:  Conclusions   Summary   LV is moderately dilated   LVEF is severely depressed with EF 30-35%   E/A flow reversal noted. Suggestive of diastolic dysfunction. Signature   ----------------------------------------------------------------   Electronically signed by Jessica Tran MD(Interpreting   physician) on 09/13/2018 11:45 AM   ----------------------------------------------------------------   Findings  Left Ventricle  LV is moderately dilated  LVEF is severely depressed with EF 30-35%  E/A flow reversal noted. Suggestive of diastolic dysfunction. Right Ventricle  Normal right ventricular size with preserved RV function. Left Atrium  Moderately dilated left atrium. Right Atrium  Normal right atrial dimension with no evidence of thrombus or mass noted. Mitral Valve  Mitral annular calcification is present. Mild MR  Tricuspid Valve  Normal structure with Mild tricuspid valvular regurgitation noted. Aortic Valve  Aortic valve leaflets are moderately thickened. Trace AR  Pulmonic Valve  Mild IL  Pericardial Effusion  No evidence of significant pericardial effusion is noted. Pleural Effusion  No evidence of pleural effusion. Aorta \ Miscellaneous  The aorta is within normal limits. Aortic root dimension within normal limits.       PMHx:  -Hx of superior mesenteric artery stenosis s/p PTA/stent in [place]/May 2018  -CMP EF 30-35%  -Hx AICD  -Trivial CAD per [facility] 12/8/17  -CKD  -Renal CA s/p left nephrectomy around 2014  -Dyslipidemia  -Recurrent UTI  -Hx A-fib on [address] with Eliquis    PSHx:  -Left nephrectomy for CA  -BiV ICD 10/30/18 with Dr. Randa Hodgkin Hx:  Nonsmoker  Social alcohol  No drugs    Family Hx:  -Father with \"heart problems\"    Allergies   Allergen Reactions    Morphine      Other reaction(s): Delirium       Current Outpatient Medications   Medication Sig Dispense Refill    amiodarone (CORDARONE) 200 MG tablet Take 200 mg by mouth daily      Non-ischemic cardiomyopathy ([facility] [address] 4/9/2021    Osteoarthritis     Sinus bradycardia on ECG 6/13/2016       Past Surgical History:   Procedure Laterality Date    ANGIOPLASTY  05/08/2018    percutaneous transluminal angioplasty of the SMA with stent implantation, [place] [address] [facility] COLONOSCOPY  2016    COLONOSCOPY N/A 10/20/2020    COLONOSCOPY DIAGNOSTIC performed by Cheryle Bermudez MD at [facility]  01/25/2019    DR Aida Montanez [address] N/A 11/4/2020    LAPAROSCOPIC RIGHT COLECTOMY performed by Walt Benjamin MD at [facility] ESOPHAGOGASTRODUODENOSCOPY TRANSORAL DIAGNOSTIC N/A 6/5/2018    EGD ESOPHAGOGASTRODUODENOSCOPY performed by Cheryle Bermudez MD at [place] [address]  08/12/2016    left kidney, Dr. Cortes Rendon N/A 10/20/2020    EGD ESOPHAGOGASTRODUODENOSCOPY performed by Cheryle Bermudez MD at [place] [id number]  01/2019       Social History     Socioeconomic History    Marital status:      Spouse name: None    Number of children: None    Years of education: None    Highest education level: None   Occupational History    None   Tobacco Use    Smoking status: Never Smoker    Smokeless tobacco: Never Used   Vaping Use    Vaping Use: Never used   Substance and Sexual Activity    Alcohol use: Yes     Comment: Rarely beer    Drug use: No    Sexual activity: None   Other Topics Concern    None   Social History Narrative    None     Social Determinants of Health     Financial Resource Strain:     Difficulty of Paying Living Expenses:    Food Insecurity:     Worried About Running Out of Food in the Last Year:     Ran Out of Food in the Last Year:    Transportation Needs:     Lack of Transportation (Medical):      Lack of Transportation (Non-Medical):    Physical Activity:     Days of Exercise per Week:  Minutes of Exercise per Session:    Stress:     Feeling of Stress :    Social Connections:     Frequency of Communication with Friends and Family:     Frequency of Social Gatherings with Friends and Family:     Attends Rastafarian Services:     Active Member of Clubs or Organizations:     Attends Club or Organization Meetings:     Marital Status:    Intimate Partner Violence:     Fear of Current or Ex-Partner:     Emotionally Abused:     Physically Abused:     Sexually Abused:        Family History   Problem Relation Age of Onset    Heart Attack Father     Heart Attack Brother          Review of Systems:   Review of Systems   Constitutional: Positive for fatigue (with exertion). Negative for activity change, chills, fever and unexpected weight change. HENT: Positive for rhinorrhea. Negative for congestion, sinus pressure and sore throat. No smell--lost years ago after accident   Respiratory: Positive for cough (with green sputum production) and shortness of breath (with minimal exertion). Negative for chest tightness. Cardiovascular: Negative for chest pain, palpitations and leg swelling. Occasional orthopnea/PND   Gastrointestinal: Positive for abdominal pain (mild abdominal pain yesterday with 3 diarrhea stools) and nausea (intermittent). Negative for abdominal distention, blood in stool and vomiting. Genitourinary: Positive for difficulty urinating (difficulty emptying bladder). Negative for dysuria and hematuria. Musculoskeletal: Negative for arthralgias and myalgias. Skin: Negative for color change, pallor and rash. Neurological: Negative for dizziness, syncope and light-headedness. Psychiatric/Behavioral: Negative for agitation and behavioral problems.          Physical Examination:    /73 (Site: Left Upper Arm, Position: Sitting, Cuff Size: Small Adult)   Pulse 82   Resp 18   Ht 5' 9\" (1.753 m)   Wt 151 lb (68.5 kg)   SpO2 99%   BMI 22.30 kg/m² gain  -Educated patient on importance of fluid and salt restriction as well as lifestyle modification  -Echocardiogram 1/18/2021: Severely reduced LV systolic function with EF 10 to 15%, moderately severe 3-4+ mitral valve regurgitation, moderate TR with RVSP of 63 mmHg consistent with moderate to severe pulmonary hypertension  -Maintain routine outpatient follow-up with general cardiologist, Dr. Lali Escobedo schedule follow-up with Dr. Breezy Miguel in 2 weeks to discuss possible referral for mitral valve clip. Patient has seen Dr. Breezy Miguel in the past but most recently saw Dr. Javi Rivera. Cardiac catheterization in 2017 with trivial CAD  Consider further PAD evaluation in future given  history of PAD. **Patient advised to notify office immediately if any upper respiratory symptoms persist or worsen. May need to consider addition of short course of antibiotic therapy if symptoms persist.    Patient has no fever or significant upper respiratory complaints at this time only mild runny nose and intermittent mildly productive cough. -Maintain routine outpatient follow-up with PCP  -Maintain outpatient follow-up with [facility] device clinic  -F/U with CHF clinic in 2 weeks or sooner if needed        Counseling: The importance of daily weights, dietary sodium restriction, and contact with cardiology if weight is increased more than 3 lbs in any 48 hour period was stressed. The patient has been advised to contact us if theyexperience progressive SOB, orthopnea, PND or progressive edema.

## 2021-06-07 ENCOUNTER — OFFICE VISIT (OUTPATIENT)
Dept: CARDIOLOGY CLINIC | Age: 86
End: 2021-06-07
Payer: MEDICARE

## 2021-06-07 VITALS
HEIGHT: 69 IN | BODY MASS INDEX: 22.66 KG/M2 | OXYGEN SATURATION: 98 % | HEART RATE: 66 BPM | WEIGHT: 153 LBS | DIASTOLIC BLOOD PRESSURE: 60 MMHG | SYSTOLIC BLOOD PRESSURE: 102 MMHG

## 2021-06-07 DIAGNOSIS — I34.0 SEVERE MITRAL VALVE REGURGITATION: Primary | ICD-10-CM

## 2021-06-07 DIAGNOSIS — Z86.79 HISTORY OF ATRIAL FIBRILLATION: ICD-10-CM

## 2021-06-07 DIAGNOSIS — E78.5 HYPERLIPIDEMIA, UNSPECIFIED HYPERLIPIDEMIA TYPE: ICD-10-CM

## 2021-06-07 PROBLEM — R06.09 DYSPNEA ON EXERTION: Status: ACTIVE | Noted: 2021-06-07

## 2021-06-07 PROBLEM — I25.10 ATHEROSCLEROSIS OF CORONARY ARTERY: Status: ACTIVE | Noted: 2019-03-05

## 2021-06-07 PROBLEM — K59.00 CONSTIPATION: Status: ACTIVE | Noted: 2021-06-07

## 2021-06-07 PROBLEM — F41.8 MIXED ANXIETY DEPRESSIVE DISORDER: Status: ACTIVE | Noted: 2021-06-07

## 2021-06-07 PROBLEM — R09.89 DEPRESSED LEFT VENTRICULAR EJECTION FRACTION: Status: ACTIVE | Noted: 2021-06-07

## 2021-06-07 PROBLEM — Z95.810 AICD (AUTOMATIC CARDIOVERTER/DEFIBRILLATOR) PRESENT: Status: ACTIVE | Noted: 2021-06-07

## 2021-06-07 PROCEDURE — 99215 OFFICE O/P EST HI 40 MIN: CPT | Performed by: INTERNAL MEDICINE

## 2021-06-07 RX ORDER — SIMVASTATIN 20 MG
20 TABLET ORAL NIGHTLY
COMMUNITY
End: 2021-06-07 | Stop reason: DRUGHIGH

## 2021-06-07 ASSESSMENT — ENCOUNTER SYMPTOMS
SHORTNESS OF BREATH: 0
BLOOD IN STOOL: 0
NAUSEA: 0
APNEA: 0
WHEEZING: 0
TROUBLE SWALLOWING: 0
VOMITING: 0
ABDOMINAL DISTENTION: 0
CHEST TIGHTNESS: 0
ANAL BLEEDING: 0
COLOR CHANGE: 0
FACIAL SWELLING: 0
VOICE CHANGE: 0

## 2021-06-07 NOTE — PROGRESS NOTES
[facility] CARDIOLOGY OFFICE FOLLOW-UP      Patient: Jonathan Lozoya  YOB: 1934  MRN: [id number]    Chief Complaint:  Chief Complaint   Patient presents with    Follow-up     saw dr holiday 4/2021    Atrial Fibrillation    Hyperlipidemia    Hypertension         Subjective/HPI:  6/7/21: Patient presents today for mitral regurgitation. Severely impaired left ventricular function by echo. He has previously been a patient of mine for many years and then saw [address] He is accompanied by a neighbor friend. Very pleasant gentleman complains of exercise intolerance. No angina or edema. Just marked exercise intolerance. Echo was reviewed. He is not on Toprol because of low blood pressure. Will cut down the amiodarone to 2 days a week. He needs to be considered for oxygen therapy at home. Discontinue Zocor. Discontinue iron pills which constipate him and cause more issues than worth. He will see me in 2 weeks. Will probably get a MUGA scan at the next visit.           Past Medical History:   Diagnosis Date    Abnormal finding on EKG 6/13/2016    Atrial fibrillation (HCC) 1/22/2018    BPH (benign prostatic hypertrophy)     Cancer (HCC)     kidney - removed Left kidney    Colon polyps     Congestive heart failure ([facility] 1/24/2019    GERD (gastroesophageal reflux disease)     Hyperlipidemia     Hypertension     Left bundle branch block 6/13/2016    Non-ischemic cardiomyopathy ([facility] 4/9/2021    Osteoarthritis     Sinus bradycardia on ECG 6/13/2016       Past Surgical History:   Procedure Laterality Date    ANGIOPLASTY  05/08/2018    percutaneous transluminal angioplasty of the SMA with stent implantation, [place] [address] [facility] COLONOSCOPY  2016    COLONOSCOPY N/A 10/20/2020    COLONOSCOPY DIAGNOSTIC performed by Kelley Lara MD at [place] [id number]  01/25/2019    DR Aida Montanez 35 N/A 11/4/2020    LAPAROSCOPIC RIGHT COLECTOMY performed by Eric Monahan MD at [address] [facility] ESOPHAGOGASTRODUODENOSCOPY TRANSORAL DIAGNOSTIC N/A 6/5/2018    EGD ESOPHAGOGASTRODUODENOSCOPY performed by Karime Proctor MD at [facility]  08/12/2016    left kidney, Dr. Dina Wiggins N/A 10/20/2020    EGD ESOPHAGOGASTRODUODENOSCOPY performed by Karime Proctor MD at [place] [id number]  01/2019       Family History   Problem Relation Age of Onset    Heart Attack Father     Heart Attack Brother        Social History     Socioeconomic History    Marital status:      Spouse name: Not on file    Number of children: Not on file    Years of education: Not on file    Highest education level: Not on file   Occupational History    Not on file   Tobacco Use    Smoking status: Never Smoker    Smokeless tobacco: Never Used   Vaping Use    Vaping Use: Never used   Substance and Sexual Activity    Alcohol use: Yes     Comment: Rarely beer    Drug use: No    Sexual activity: Not on file   Other Topics Concern    Not on file   Social History Narrative    Not on file     Social Determinants of Health     Financial Resource Strain:     Difficulty of Paying Living Expenses:    Food Insecurity:     Worried About Running Out of Food in the Last Year:     [address] in the Last Year:    Transportation Needs:     Lack of Transportation (Medical):      Lack of Transportation (Non-Medical):    Physical Activity:     Days of Exercise per Week:     Minutes of Exercise per Session:    Stress:     Feeling of Stress :    Social Connections:     Frequency of Communication with Friends and Family:     Frequency of Social Gatherings with Friends and Family:     Attends Rastafari Services:     Active Member of Clubs or Organizations:     Attends Club or Organization Meetings:     Marital Status:    Intimate Partner FOR NAUSEA AND VOMITING (Patient not taking: Reported on 6/7/2021) 30 tablet 1     No current facility-administered medications for this visit. Review of Systems:   Review of Systems   Constitutional: Positive for fatigue. Negative for activity change, appetite change, diaphoresis and unexpected weight change. HENT: Negative for facial swelling, nosebleeds, trouble swallowing and voice change. Respiratory: Negative for apnea, chest tightness, shortness of breath and wheezing. Cardiovascular: Negative for chest pain, palpitations and leg swelling. Gastrointestinal: Negative for abdominal distention, anal bleeding, blood in stool, nausea and vomiting. Genitourinary: Negative for decreased urine volume and dysuria. Musculoskeletal: Negative for gait problem and myalgias. Skin: Negative for color change, pallor, rash and wound. Neurological: Positive for weakness. Negative for dizziness, syncope, facial asymmetry, light-headedness, numbness and headaches. Hematological: Does not bruise/bleed easily. Psychiatric/Behavioral: Negative for agitation, behavioral problems, confusion, hallucinations and suicidal ideas. The patient is not nervous/anxious. All other systems reviewed and are negative. Review of System is negative except for as mentioned above. Physical Examination:    /60 (Site: Right Upper Arm, Position: Sitting, Cuff Size: Medium Adult)   Pulse 66   Ht 5' 9\" (1.753 m)   Wt 153 lb (69.4 kg)   SpO2 98%   BMI 22.59 kg/m²    Physical Exam   Constitutional: He appears healthy. No distress. HENT:   Nose: Nose normal.   Mouth/Throat: Dentition is normal. Oropharynx is clear. Eyes: Pupils are equal, round, and reactive to light. Conjunctivae are normal.   Neck: Thyroid normal.   Cardiovascular: Regular rhythm, S1 normal, S2 normal, normal heart sounds, intact distal pulses and normal pulses. PMI is not displaced. No murmur heard.   Pulmonary/Chest: He has no wheezes. He has no rales. He exhibits no tenderness. Abdominal: Soft. Bowel sounds are normal. He exhibits no distension and no mass. There is no splenomegaly or hepatomegaly. There is no abdominal tenderness. No hernia. Musculoskeletal:      Cervical back: Normal range of motion and neck supple. Neurological: He is alert and oriented to person, place, and time. He has normal motor skills. Gait normal.   Skin: Skin is warm and dry. No cyanosis. No jaundice. Nails show no clubbing. Patient Active Problem List   Diagnosis    Hypertension    Benign prostatic hyperplasia    GERD (gastroesophageal reflux disease)    Osteoarthritis    Type 2 diabetes mellitus without complication (HCC)    Hyperlipidemia    Bladder cancer ([address]    Left bundle branch block    Abnormal finding on EKG    Primary bladder malignant neoplasm ([address]    Hypogonadism in male    Atrial fibrillation (HCC)    Abdominal pain    Mesenteric ischemia (HCC)    Epigastric abdominal pain    Nausea    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    Urothelial carcinoma (HCC)    Chronic kidney disease, stage III (moderate) (HCC)    Congestive heart failure (HCC)    Colon cancer (HCC)    Glaucoma    Cardiomyopathy ([address]    Non-ischemic cardiomyopathy ([address]    AICD (automatic cardioverter/defibrillator) present    Constipation    Atherosclerosis of coronary artery    Depressed left ventricular ejection fraction    Dyspnea on exertion    Mixed anxiety depressive disorder           No orders of the defined types were placed in this encounter. No orders of the defined types were placed in this encounter. Assessment/Orders:       ICD-10-CM    1. Severe mitral valve regurgitation  I34.0    2. History of atrial fibrillation  Z86.79    3. Hyperlipidemia, unspecified hyperlipidemia type  E78.5        No orders of the defined types were placed in this encounter.       Medications Discontinued During This Encounter Medication Reason    simvastatin (ZOCOR) 20 MG tablet DOSE ADJUSTMENT    ferrous sulfate (IRON 325) 325 (65 Fe) MG tablet Side effects       No orders of the defined types were placed in this encounter. Plan: Will cut down the amiodarone to 2 days a week. He needs to be considered for oxygen therapy at home. Patient Referred to Medical Services for Oxygen with Robb Coffey    Discontinue Zocor. Discontinue iron pills which constipate him. Stay on same medications.     See me in 2 weeks        Electronically signed by: Dereje Gannon MD  6/7/2021 3:13 PM

## 2021-06-10 ENCOUNTER — TELEPHONE (OUTPATIENT)
Dept: CARDIOLOGY CLINIC | Age: 86
End: 2021-06-10

## 2021-06-10 DIAGNOSIS — I10 ESSENTIAL HYPERTENSION: Primary | ICD-10-CM

## 2021-06-10 DIAGNOSIS — I50.22 CHRONIC SYSTOLIC CHF (CONGESTIVE HEART FAILURE), NYHA CLASS 3 (HCC): ICD-10-CM

## 2021-06-10 DIAGNOSIS — N18.9 CHRONIC KIDNEY DISEASE, UNSPECIFIED CKD STAGE: ICD-10-CM

## 2021-06-10 NOTE — TELEPHONE ENCOUNTER
Per Mazin Rico PA-C clarify with caregiver Mukund Tony if patient is taking metoprolol and what dose of lasix. Per Mukund Tony patient is not taking metoprolol-it was discontinued in May 2021. He is taking lasix 20mg daily. Per SARAH Nunez PA-C have patient repeat BMP this week or next week. Mukund Tony aware.

## 2021-06-10 NOTE — TELEPHONE ENCOUNTER
Patient has decided that he would like to have oxygen after all even though he refused at last office visit. Can you please order for him?

## 2021-06-18 ENCOUNTER — TELEPHONE (OUTPATIENT)
Dept: CARDIOLOGY CLINIC | Age: 86
End: 2021-06-18

## 2021-06-18 DIAGNOSIS — D64.9 ANEMIA, UNSPECIFIED TYPE: Primary | ICD-10-CM

## 2021-06-18 DIAGNOSIS — N18.9 CHRONIC KIDNEY DISEASE, UNSPECIFIED CKD STAGE: ICD-10-CM

## 2021-06-18 NOTE — TELEPHONE ENCOUNTER
Called patient to remind him to go for lab. He states that for the last 2 weeks he has had dark black stools. Dr Mirza Padilla stopped his iron at his last office visit on 6-7-2021. Per SARAH Nunez PA-C have patient go for CBC STAT and if patient feels poorly or gets worse have patient go to ER. Patient aware and verbalized understanding.

## 2021-07-06 ENCOUNTER — TELEPHONE (OUTPATIENT)
Dept: CARDIOLOGY CLINIC | Age: 86
End: 2021-07-06

## 2021-07-06 NOTE — TELEPHONE ENCOUNTER
The patient called and he is wanting to proceed with the order for home o2. It was previously ordered and he was refusing the service. I spoke with Babetta Apgar at [address] [facility] and they will attempt to reach back out to the patient for set up, but he had previously refused due to the out of pocket copay that would be due per his insurance company. I did call the patient back and alerted him that they would be calling and that there is a monthly charge per his insurance. He stated he was okay with that.

## 2021-07-13 ENCOUNTER — TELEPHONE (OUTPATIENT)
Dept: CARDIOLOGY CLINIC | Age: 86
End: 2021-07-13

## 2021-07-13 NOTE — TELEPHONE ENCOUNTER
Kristin Mckinney with [facility] requesting a return call at [phone] from  to discuss if patient is appropriate hospice applicant based on his cardiac condition.

## 2021-07-15 NOTE — TELEPHONE ENCOUNTER
If quality of life is really low/bad then yes ok for hospice/palliative care from my standpoint. Looks like he saw dr Dinora Álvarez last time and bony.     Electronically signed by DO Gildardo on 7/14/2021 at 11:15 PM

## 2021-07-20 ENCOUNTER — HOSPITAL ENCOUNTER (OUTPATIENT)
Dept: CARDIOLOGY | Age: 86
Discharge: HOME | End: 2021-07-20
Payer: MEDICARE

## 2021-07-20 PROCEDURE — 93296 REM INTERROG EVL PM/IDS: CPT

## 2021-07-22 NOTE — TELEPHONE ENCOUNTER
TRIED TO CONTACT NATHANIEL FROM [facility]. SHE IS ON VACTION. CALLED MAIN OFFICE AT [phone] AND NO ANSWER OR MACHINE.  UNABLE TO LEAVE MESSAGE

## 2023-07-16 NOTE — PATIENT INSTRUCTIONS
-Change lasix to 20mg PO daily  -Change potassium to 10meq PO daily    -Check daily weight every morning and notify CHF clinic if gaining more than 3 pounds in 2 days    -Limit sodium to less than 2000mg daily.  Minimize canned soups, canned vegetable, eating out, processed meats DISCHARGE

## (undated) DEVICE — SUTURE PROL SZ 0 L30IN NONABSORBABLE BLU L36MM CT-1 1/2 CIR 8424H

## (undated) DEVICE — PACK,SET UP,DRAPE: Brand: MEDLINE

## (undated) DEVICE — SUTURE ABSORBABLE BRAIDED 0 CT-1 8X27 IN UD VICRYL JJ41G

## (undated) DEVICE — GOWN,AURORA,NONREINFORCED,LARGE: Brand: MEDLINE

## (undated) DEVICE — SINGLE PORT MANIFOLD: Brand: NEPTUNE 2

## (undated) DEVICE — GLOVE ORANGE PI 7 1/2   MSG9075

## (undated) DEVICE — ELECTRODE PT RET AD L9FT HI MOIST COND ADH HYDRGEL CORDED

## (undated) DEVICE — COUNTER NDL 40 COUNT HLD 70 FOAM BLK ADH W/ MAG

## (undated) DEVICE — ELECTRODE LAP L36CM PTFE WIRE J HK CLEANCOAT

## (undated) DEVICE — 4-PORT MANIFOLD: Brand: NEPTUNE 2

## (undated) DEVICE — Device

## (undated) DEVICE — TUBING, SUCTION, 1/4" X 10', STRAIGHT: Brand: MEDLINE

## (undated) DEVICE — SUTURE VCRL SZ 2-0 L27IN ABSRB UD L26MM SH 1/2 CIR J417H

## (undated) DEVICE — FORCEPS BX L240CM JAW DIA2.4MM ORNG L CAP W/ NDL DISP RAD

## (undated) DEVICE — BANDAGE ADH W2XL4IN NITRL FAB STRP CURAD

## (undated) DEVICE — Device: Brand: SPOT EX ENDOSCOPIC TATTOO

## (undated) DEVICE — STAPLER INT 75MM CUT LN L73MM STPL LN L77MM LNAR B-FORM

## (undated) DEVICE — DRAPE,LAP,CHOLE,W/TROUGHS,STERILE: Brand: MEDLINE

## (undated) DEVICE — CONMED SCOPE SAVER BITE BLOCK, 20X27 MM: Brand: SCOPE SAVER

## (undated) DEVICE — SNARE ENDOSCP AD L240CM LOOP W10MM SHTH DIA2.4MM RND INSUL

## (undated) DEVICE — YANKAUER,BULB TIP,W/O VENT,RIGID,STERILE: Brand: MEDLINE

## (undated) DEVICE — DRAPE EQUIP TRNSPRT CONTAINMENT FOR BK TAB

## (undated) DEVICE — GOWN,SIRUS,POLYRNF,BRTHSLV,XLN/XL,20/CS: Brand: MEDLINE

## (undated) DEVICE — GAUZE,SPONGE,4"X4",16PLY,XRAY,STRL,LF: Brand: MEDLINE

## (undated) DEVICE — TOWEL,OR,DSP,ST,BLUE,STD,4/PK,20PK/CS: Brand: MEDLINE

## (undated) DEVICE — MARKER SURG SKIN GENTIAN VLT REG TIP W/ 6IN RUL

## (undated) DEVICE — LAPAROSCOPIC TROCAR SLEEVE/SINGLE USE: Brand: KII® OPTICAL ACCESS SYSTEM

## (undated) DEVICE — BRUSH ENDO CLN L90.5IN SHTH DIA1.7MM BRIST DIA5-7MM 2-6MM

## (undated) DEVICE — PENCIL SMOKE EVAC PUSH BUTTON COATED

## (undated) DEVICE — ENDO CARRY-ON PROCEDURE KIT: Brand: ENDO CARRY-ON PROCEDURE KIT

## (undated) DEVICE — INTENDED FOR TISSUE SEPARATION, AND OTHER PROCEDURES THAT REQUIRE A SHARP SURGICAL BLADE TO PUNCTURE OR CUT.: Brand: BARD-PARKER ® CARBON RIB-BACK BLADES

## (undated) DEVICE — TROCAR: Brand: KII FIOS FIRST ENTRY

## (undated) DEVICE — KIT,ANTI FOG,W/SPONGE & FLUID,SOFT PACK: Brand: MEDLINE

## (undated) DEVICE — RELOAD STPL L75MM OPN H3.8MM CLS 1.5MM WIRE DIA0.2MM REG

## (undated) DEVICE — TTL1LYR 16FR10ML 100%SIL TMPST TR: Brand: MEDLINE

## (undated) DEVICE — SPONGE,LAP,18"X18",DLX,XR,ST,5/PK,40/PK: Brand: MEDLINE

## (undated) DEVICE — Device: Brand: ENDO SMARTCAP

## (undated) DEVICE — SEALER ENDOSCP NANO COAT OPN DIV CRV L JAW LIGASURE IMPACT

## (undated) DEVICE — Z DUPLICATE USE 2431315 SET INSUF TBNG HI FLO W/ SMK EVAC FOR PNEUMOCLEAR

## (undated) DEVICE — YANKAUER,POOLE TIP,STERILE,50/CS: Brand: MEDLINE

## (undated) DEVICE — GAUZE,SPONGE,FLUFF,6"X6.75",STRL,10/TRAY: Brand: MEDLINE

## (undated) DEVICE — WARMER SCP 2 ANTIFOG LAP DISP

## (undated) DEVICE — TROCAR: Brand: KII® SLEEVE

## (undated) DEVICE — DRAPE THER FLUID WARMING 66X44 IN FLAT SLUSH DBL DISC ORS

## (undated) DEVICE — ADAPTER FLSH PMP FLD MGMT GI IRRIG OFP 2 DISPOSABLE

## (undated) DEVICE — SUTURE PERMA-HAND SZ 3-0 L18IN NONABSORBABLE BLK SH-1 L22MM C003D

## (undated) DEVICE — COVER DUST PERIMETER HUMPREY

## (undated) DEVICE — TUBE SET 96 MM 64 MM H2O PERISTALTIC STD AUX CHANNEL

## (undated) DEVICE — APPLICATOR MEDICATED 26 CC SOLUTION HI LT ORNG CHLORAPREP

## (undated) DEVICE — CATHETER SCLERO L240CM NDL 25GA L4MM SHTH DIA2.3MM CNTRST

## (undated) DEVICE — E-Z CLEAN, NON-STICK, PTFE COATED, ELECTROSURGICAL BLADE ELECTRODE, 6.5 INCH (16.5 CM): Brand: MEGADYNE